# Patient Record
Sex: MALE | Race: WHITE | NOT HISPANIC OR LATINO | Employment: OTHER | URBAN - METROPOLITAN AREA
[De-identification: names, ages, dates, MRNs, and addresses within clinical notes are randomized per-mention and may not be internally consistent; named-entity substitution may affect disease eponyms.]

---

## 2017-01-12 ENCOUNTER — APPOINTMENT (OUTPATIENT)
Dept: LAB | Facility: HOSPITAL | Age: 62
End: 2017-01-12
Attending: INTERNAL MEDICINE
Payer: MEDICARE

## 2017-01-12 ENCOUNTER — TRANSCRIBE ORDERS (OUTPATIENT)
Dept: ADMINISTRATIVE | Facility: HOSPITAL | Age: 62
End: 2017-01-12

## 2017-01-12 ENCOUNTER — ALLSCRIPTS OFFICE VISIT (OUTPATIENT)
Dept: OTHER | Facility: OTHER | Age: 62
End: 2017-01-12

## 2017-01-12 DIAGNOSIS — R10.33 PERIUMBILICAL PAIN: ICD-10-CM

## 2017-01-12 LAB
ALBUMIN SERPL BCP-MCNC: 3.3 G/DL (ref 3.5–5)
ALP SERPL-CCNC: 77 U/L (ref 46–116)
ALT SERPL W P-5'-P-CCNC: 35 U/L (ref 12–78)
ANION GAP SERPL CALCULATED.3IONS-SCNC: 5 MMOL/L (ref 4–13)
AST SERPL W P-5'-P-CCNC: 14 U/L (ref 5–45)
BILIRUB SERPL-MCNC: 0.2 MG/DL (ref 0.2–1)
BUN SERPL-MCNC: 16 MG/DL (ref 5–25)
CALCIUM SERPL-MCNC: 8.3 MG/DL (ref 8.3–10.1)
CHLORIDE SERPL-SCNC: 103 MMOL/L (ref 100–108)
CO2 SERPL-SCNC: 29 MMOL/L (ref 21–32)
CREAT SERPL-MCNC: 0.96 MG/DL (ref 0.6–1.3)
CRP SERPL QL: 1.5 MG/L
ERYTHROCYTE [DISTWIDTH] IN BLOOD BY AUTOMATED COUNT: 12.8 % (ref 11.6–15.1)
GFR SERPL CREATININE-BSD FRML MDRD: >60 ML/MIN/1.73SQ M
GLUCOSE SERPL-MCNC: 322 MG/DL (ref 65–140)
HCT VFR BLD AUTO: 45.1 % (ref 36.5–49.3)
HGB BLD-MCNC: 15.4 G/DL (ref 12–17)
LIPASE SERPL-CCNC: 178 U/L (ref 73–393)
MCH RBC QN AUTO: 29.6 PG (ref 26.8–34.3)
MCHC RBC AUTO-ENTMCNC: 34.1 G/DL (ref 31.4–37.4)
MCV RBC AUTO: 87 FL (ref 82–98)
PLATELET # BLD AUTO: 189 THOUSANDS/UL (ref 149–390)
PMV BLD AUTO: 9.9 FL (ref 8.9–12.7)
POTASSIUM SERPL-SCNC: 4.2 MMOL/L (ref 3.5–5.3)
PROT SERPL-MCNC: 6.7 G/DL (ref 6.4–8.2)
RBC # BLD AUTO: 5.21 MILLION/UL (ref 3.88–5.62)
SODIUM SERPL-SCNC: 137 MMOL/L (ref 136–145)
WBC # BLD AUTO: 7.72 THOUSAND/UL (ref 4.31–10.16)

## 2017-01-12 PROCEDURE — 36415 COLL VENOUS BLD VENIPUNCTURE: CPT

## 2017-01-12 PROCEDURE — 80053 COMPREHEN METABOLIC PANEL: CPT

## 2017-01-12 PROCEDURE — 83690 ASSAY OF LIPASE: CPT

## 2017-01-12 PROCEDURE — 85027 COMPLETE CBC AUTOMATED: CPT

## 2017-01-12 PROCEDURE — 86140 C-REACTIVE PROTEIN: CPT

## 2017-01-17 ENCOUNTER — HOSPITAL ENCOUNTER (OUTPATIENT)
Dept: CT IMAGING | Facility: HOSPITAL | Age: 62
Discharge: HOME | End: 2017-01-17
Attending: INTERNAL MEDICINE
Payer: MEDICARE

## 2017-01-17 DIAGNOSIS — R10.33 PERIUMBILICAL PAIN: ICD-10-CM

## 2017-01-17 PROCEDURE — 74177 CT ABD & PELVIS W/CONTRAST: CPT

## 2017-01-17 RX ADMIN — IOHEXOL 100 ML: 350 INJECTION, SOLUTION INTRAVENOUS at 08:06

## 2017-01-19 ENCOUNTER — GENERIC CONVERSION - ENCOUNTER (OUTPATIENT)
Dept: OTHER | Facility: OTHER | Age: 62
End: 2017-01-19

## 2018-01-13 VITALS
HEART RATE: 79 BPM | HEIGHT: 70 IN | WEIGHT: 210 LBS | OXYGEN SATURATION: 99 % | DIASTOLIC BLOOD PRESSURE: 78 MMHG | RESPIRATION RATE: 18 BRPM | SYSTOLIC BLOOD PRESSURE: 130 MMHG | TEMPERATURE: 97.5 F | BODY MASS INDEX: 30.06 KG/M2

## 2018-01-13 NOTE — RESULT NOTES
Verified Results  * CT ABDOMEN PELVIS W CONTRAST [id number] 07:47AM Gwen Mata Order Number: [id number]   Performing Comments: eval for mesenteric panniculitis and pancreatitis   - Patient Instructions: To schedule this appointment, please contact Central Scheduling at [phone]  Test Name Result Flag Reference   CT ABDOMEN PELVIS W CONTRAST (Report)     CT ABDOMEN AND PELVIS WITH IV CONTRAST     INDICATION: Mid abdominal pain x1 month with bloating  Evaluate for pancreatitis  COMPARISON: Ultrasound 12/26/2016  CT scan 12/26/2016  TECHNIQUE: CT examination of the abdomen and pelvis  Contrast was injected one time intravenously without immediate complication  Scanning through the abdomen was performed in arterial, venous and delayed phases according a protocol spefically    designed to evaluate upper abdominal viscera  Axial, sagittal and coronal reformatted projections were created  This examination, like all CT scans performed in the [facility], was performed utilizing techniques to minimize radiation    dose exposure, including the use of iterative reconstruction and automated exposure control  IV Contrast: iohexol (OMNIPAQUE) 350 MG/ML injection (MULTI-DOSE) 100 mL Note: (SINGLE DOSE/MULTI DOSE) information refers to the container from which the contrast was acquired  Contrast was injected one time intravenously without immediate    complication  Enteric Contrast: Enteric contrast was administered  FINDINGS:     ABDOMEN     LOWER CHEST: No significant abnormalities identified in the lower chest      LIVER/BILIARY TREE: Liver is diffusely decreased in density consistent with fatty change  No CT evidence of suspicious hepatic mass  Normal hepatic contours  No biliary dilatation  GALLBLADDER: No calcified gallstones  No pericholecystic inflammatory change  SPLEEN: Unremarkable  PANCREAS: Unremarkable  ADRENAL GLANDS: Unremarkable  KIDNEYS/URETERS: Stable simple cyst in the right kidney measures 3 3 x 3 2 cm  No hydronephrosis or calculi  STOMACH AND BOWEL: Unremarkable  APPENDIX: No findings to suggest appendicitis  ABDOMINOPELVIC CAVITY: Stable hazy infiltration of the mesenteric fat with numerous interspersed prominent but not pathologically enlarged lymph nodes  No ascites  No pneumoperitoneum  VESSELS: Atherosclerotic changes are present  No evidence of aneurysm  PELVIS     REPRODUCTIVE ORGANS: Unremarkable for patient's age  URINARY BLADDER: Unremarkable  ABDOMINAL WALL/INGUINAL REGIONS: Unremarkable  OSSEOUS STRUCTURES: No acute fracture or destructive osseous lesion  IMPRESSION:     1  No acute pathology  No evidence for pancreatitis  2  Stable maude mesentery sign  This is nonspecific, however differential considerations again include mesenteric panniculitis, and less likely lymphoma  3  Fatty infiltration of the liver         Workstation performed: [id number]     Signed by:   Seymour Limon MD   1/17/17       Plan  Mesenteric panniculitis, Periumbilical abdominal pain    · PredniSONE 20 MG Oral Tablet; TAKE 2 TABLETS DAILY WITH FOOD   · Tamoxifen Citrate 10 MG Oral Tablet; TAKE 1 TABLET EVERY 12 HOURS DAILY

## 2019-05-02 ENCOUNTER — HOSPITAL ENCOUNTER (EMERGENCY)
Facility: HOSPITAL | Age: 64
Discharge: HOME | End: 2019-05-02
Attending: EMERGENCY MEDICINE | Admitting: EMERGENCY MEDICINE
Payer: MEDICARE

## 2019-05-02 VITALS
DIASTOLIC BLOOD PRESSURE: 91 MMHG | SYSTOLIC BLOOD PRESSURE: 130 MMHG | HEART RATE: 102 BPM | RESPIRATION RATE: 20 BRPM | TEMPERATURE: 97.9 F | OXYGEN SATURATION: 99 %

## 2019-05-02 DIAGNOSIS — E11.9 DIABETES (HCC): ICD-10-CM

## 2019-05-02 DIAGNOSIS — M79.606 LEG PAIN: Primary | ICD-10-CM

## 2019-05-02 DIAGNOSIS — G62.9 NEUROPATHY: ICD-10-CM

## 2019-05-02 LAB
ALBUMIN SERPL BCP-MCNC: 3.6 G/DL (ref 3.5–5)
ALP SERPL-CCNC: 81 U/L (ref 46–116)
ALT SERPL W P-5'-P-CCNC: 31 U/L (ref 12–78)
ANION GAP SERPL CALCULATED.3IONS-SCNC: 7 MMOL/L (ref 4–13)
AST SERPL W P-5'-P-CCNC: 13 U/L (ref 5–45)
BASOPHILS # BLD AUTO: 0.06 THOUSANDS/ΜL (ref 0–0.1)
BASOPHILS NFR BLD AUTO: 1 % (ref 0–1)
BILIRUB DIRECT SERPL-MCNC: 0.09 MG/DL (ref 0–0.2)
BILIRUB SERPL-MCNC: 0.4 MG/DL (ref 0.2–1)
BUN SERPL-MCNC: 19 MG/DL (ref 5–25)
CALCIUM SERPL-MCNC: 8.8 MG/DL (ref 8.3–10.1)
CHLORIDE SERPL-SCNC: 103 MMOL/L (ref 100–108)
CO2 SERPL-SCNC: 27 MMOL/L (ref 21–32)
CREAT SERPL-MCNC: 0.79 MG/DL (ref 0.6–1.3)
EOSINOPHIL # BLD AUTO: 0.26 THOUSAND/ΜL (ref 0–0.61)
EOSINOPHIL NFR BLD AUTO: 3 % (ref 0–6)
ERYTHROCYTE [DISTWIDTH] IN BLOOD BY AUTOMATED COUNT: 12.6 % (ref 11.6–15.1)
GFR SERPL CREATININE-BSD FRML MDRD: 95 ML/MIN/1.73SQ M
GLUCOSE SERPL-MCNC: 331 MG/DL (ref 65–140)
HCT VFR BLD AUTO: 44.5 % (ref 36.5–49.3)
HGB BLD-MCNC: 15.9 G/DL (ref 12–17)
IMM GRANULOCYTES # BLD AUTO: 0.04 THOUSAND/UL (ref 0–0.2)
IMM GRANULOCYTES NFR BLD AUTO: 1 % (ref 0–2)
LYMPHOCYTES # BLD AUTO: 2.89 THOUSANDS/ΜL (ref 0.6–4.47)
LYMPHOCYTES NFR BLD AUTO: 34 % (ref 14–44)
MAGNESIUM SERPL-MCNC: 1.8 MG/DL (ref 1.6–2.6)
MCH RBC QN AUTO: 30.8 PG (ref 26.8–34.3)
MCHC RBC AUTO-ENTMCNC: 35.7 G/DL (ref 31.4–37.4)
MCV RBC AUTO: 86 FL (ref 82–98)
MONOCYTES # BLD AUTO: 0.69 THOUSAND/ΜL (ref 0.17–1.22)
MONOCYTES NFR BLD AUTO: 8 % (ref 4–12)
NEUTROPHILS # BLD AUTO: 4.65 THOUSANDS/ΜL (ref 1.85–7.62)
NEUTS SEG NFR BLD AUTO: 53 % (ref 43–75)
NRBC BLD AUTO-RTO: 0 /100 WBCS
PLATELET # BLD AUTO: 201 THOUSANDS/UL (ref 149–390)
PMV BLD AUTO: 10.6 FL (ref 8.9–12.7)
POTASSIUM SERPL-SCNC: 4.3 MMOL/L (ref 3.5–5.3)
PROT SERPL-MCNC: 6.6 G/DL (ref 6.4–8.2)
RBC # BLD AUTO: 5.17 MILLION/UL (ref 3.88–5.62)
SODIUM SERPL-SCNC: 137 MMOL/L (ref 136–145)
TSH SERPL DL<=0.05 MIU/L-ACNC: 1.1 UIU/ML (ref 0.36–3.74)
WBC # BLD AUTO: 8.59 THOUSAND/UL (ref 4.31–10.16)

## 2019-05-02 PROCEDURE — 84443 ASSAY THYROID STIM HORMONE: CPT | Performed by: EMERGENCY MEDICINE

## 2019-05-02 PROCEDURE — 80076 HEPATIC FUNCTION PANEL: CPT | Performed by: EMERGENCY MEDICINE

## 2019-05-02 PROCEDURE — 96361 HYDRATE IV INFUSION ADD-ON: CPT

## 2019-05-02 PROCEDURE — 99283 EMERGENCY DEPT VISIT LOW MDM: CPT

## 2019-05-02 PROCEDURE — 83735 ASSAY OF MAGNESIUM: CPT | Performed by: EMERGENCY MEDICINE

## 2019-05-02 PROCEDURE — 85025 COMPLETE CBC W/AUTO DIFF WBC: CPT | Performed by: EMERGENCY MEDICINE

## 2019-05-02 PROCEDURE — 36415 COLL VENOUS BLD VENIPUNCTURE: CPT | Performed by: EMERGENCY MEDICINE

## 2019-05-02 PROCEDURE — 80048 BASIC METABOLIC PNL TOTAL CA: CPT | Performed by: EMERGENCY MEDICINE

## 2019-05-02 PROCEDURE — 86618 LYME DISEASE ANTIBODY: CPT | Performed by: EMERGENCY MEDICINE

## 2019-05-02 PROCEDURE — 99284 EMERGENCY DEPT VISIT MOD MDM: CPT | Performed by: EMERGENCY MEDICINE

## 2019-05-02 PROCEDURE — 96360 HYDRATION IV INFUSION INIT: CPT

## 2019-05-02 RX ORDER — TRAMADOL HYDROCHLORIDE 50 MG/1
50 TABLET ORAL EVERY 8 HOURS PRN
Qty: 20 TABLET | Refills: 0 | Status: SHIPPED | OUTPATIENT
Start: 2019-05-02 | End: 2019-05-09

## 2019-05-02 RX ORDER — TRAMADOL HYDROCHLORIDE 50 MG/1
50 TABLET ORAL ONCE
Status: COMPLETED | OUTPATIENT
Start: 2019-05-02 | End: 2019-05-02

## 2019-05-02 RX ADMIN — SODIUM CHLORIDE 1000 ML: 0.9 INJECTION, SOLUTION INTRAVENOUS at 18:32

## 2019-05-02 RX ADMIN — TRAMADOL HYDROCHLORIDE 50 MG: 50 TABLET, COATED ORAL at 18:23

## 2019-05-03 ENCOUNTER — TELEPHONE (OUTPATIENT)
Dept: NEUROLOGY | Facility: CLINIC | Age: 64
End: 2019-05-03

## 2019-05-03 LAB
B BURGDOR IGG SER IA-ACNC: 0.12
B BURGDOR IGM SER IA-ACNC: 0.25

## 2019-05-08 ENCOUNTER — HOSPITAL ENCOUNTER (EMERGENCY)
Facility: HOSPITAL | Age: 64
Discharge: HOME | End: 2019-05-08
Attending: EMERGENCY MEDICINE | Admitting: EMERGENCY MEDICINE
Payer: MEDICARE

## 2019-05-08 VITALS
HEIGHT: 70 IN | WEIGHT: 172 LBS | BODY MASS INDEX: 24.62 KG/M2 | HEART RATE: 83 BPM | SYSTOLIC BLOOD PRESSURE: 121 MMHG | RESPIRATION RATE: 17 BRPM | TEMPERATURE: 98.1 F | DIASTOLIC BLOOD PRESSURE: 69 MMHG | OXYGEN SATURATION: 98 %

## 2019-05-08 DIAGNOSIS — G62.9 PERIPHERAL NEUROPATHY: ICD-10-CM

## 2019-05-08 DIAGNOSIS — R42 LIGHTHEADED: Primary | ICD-10-CM

## 2019-05-08 LAB
ALBUMIN SERPL BCP-MCNC: 3.2 G/DL (ref 3.5–5)
ALP SERPL-CCNC: 78 U/L (ref 46–116)
ALT SERPL W P-5'-P-CCNC: 29 U/L (ref 12–78)
ANION GAP SERPL CALCULATED.3IONS-SCNC: 3 MMOL/L (ref 4–13)
AST SERPL W P-5'-P-CCNC: 15 U/L (ref 5–45)
ATRIAL RATE: 76 BPM
BASOPHILS # BLD AUTO: 0.07 THOUSANDS/ΜL (ref 0–0.1)
BASOPHILS NFR BLD AUTO: 1 % (ref 0–1)
BILIRUB SERPL-MCNC: 0.3 MG/DL (ref 0.2–1)
BUN SERPL-MCNC: 22 MG/DL (ref 5–25)
CALCIUM SERPL-MCNC: 8.6 MG/DL (ref 8.3–10.1)
CHLORIDE SERPL-SCNC: 102 MMOL/L (ref 100–108)
CO2 SERPL-SCNC: 32 MMOL/L (ref 21–32)
CREAT SERPL-MCNC: 0.82 MG/DL (ref 0.6–1.3)
EOSINOPHIL # BLD AUTO: 0.32 THOUSAND/ΜL (ref 0–0.61)
EOSINOPHIL NFR BLD AUTO: 5 % (ref 0–6)
ERYTHROCYTE [DISTWIDTH] IN BLOOD BY AUTOMATED COUNT: 12.5 % (ref 11.6–15.1)
GFR SERPL CREATININE-BSD FRML MDRD: 93 ML/MIN/1.73SQ M
GLUCOSE SERPL-MCNC: 274 MG/DL (ref 65–140)
HCT VFR BLD AUTO: 42.6 % (ref 36.5–49.3)
HGB BLD-MCNC: 15.3 G/DL (ref 12–17)
IMM GRANULOCYTES # BLD AUTO: 0.04 THOUSAND/UL (ref 0–0.2)
IMM GRANULOCYTES NFR BLD AUTO: 1 % (ref 0–2)
LYMPHOCYTES # BLD AUTO: 2.64 THOUSANDS/ΜL (ref 0.6–4.47)
LYMPHOCYTES NFR BLD AUTO: 41 % (ref 14–44)
MCH RBC QN AUTO: 31.2 PG (ref 26.8–34.3)
MCHC RBC AUTO-ENTMCNC: 35.9 G/DL (ref 31.4–37.4)
MCV RBC AUTO: 87 FL (ref 82–98)
MONOCYTES # BLD AUTO: 0.58 THOUSAND/ΜL (ref 0.17–1.22)
MONOCYTES NFR BLD AUTO: 9 % (ref 4–12)
NEUTROPHILS # BLD AUTO: 2.79 THOUSANDS/ΜL (ref 1.85–7.62)
NEUTS SEG NFR BLD AUTO: 43 % (ref 43–75)
NRBC BLD AUTO-RTO: 0 /100 WBCS
P AXIS: 59 DEGREES
PLATELET # BLD AUTO: 189 THOUSANDS/UL (ref 149–390)
PMV BLD AUTO: 10.4 FL (ref 8.9–12.7)
POTASSIUM SERPL-SCNC: 4.6 MMOL/L (ref 3.5–5.3)
PR INTERVAL: 146 MS
PROT SERPL-MCNC: 6.1 G/DL (ref 6.4–8.2)
QRS AXIS: 86 DEGREES
QRSD INTERVAL: 86 MS
QT INTERVAL: 394 MS
QTC INTERVAL: 443 MS
RBC # BLD AUTO: 4.9 MILLION/UL (ref 3.88–5.62)
SODIUM SERPL-SCNC: 137 MMOL/L (ref 136–145)
T WAVE AXIS: 80 DEGREES
TROPONIN I SERPL-MCNC: <0.02 NG/ML
VENTRICULAR RATE: 76 BPM
WBC # BLD AUTO: 6.44 THOUSAND/UL (ref 4.31–10.16)

## 2019-05-08 PROCEDURE — 93005 ELECTROCARDIOGRAM TRACING: CPT

## 2019-05-08 PROCEDURE — 99283 EMERGENCY DEPT VISIT LOW MDM: CPT | Performed by: PHYSICIAN ASSISTANT

## 2019-05-08 PROCEDURE — 99283 EMERGENCY DEPT VISIT LOW MDM: CPT

## 2019-05-08 PROCEDURE — 85025 COMPLETE CBC W/AUTO DIFF WBC: CPT | Performed by: PHYSICIAN ASSISTANT

## 2019-05-08 PROCEDURE — 93010 ELECTROCARDIOGRAM REPORT: CPT | Performed by: INTERNAL MEDICINE

## 2019-05-08 PROCEDURE — 84484 ASSAY OF TROPONIN QUANT: CPT | Performed by: PHYSICIAN ASSISTANT

## 2019-05-08 PROCEDURE — 80053 COMPREHEN METABOLIC PANEL: CPT | Performed by: PHYSICIAN ASSISTANT

## 2019-05-08 PROCEDURE — 36415 COLL VENOUS BLD VENIPUNCTURE: CPT | Performed by: PHYSICIAN ASSISTANT

## 2019-05-30 ENCOUNTER — TELEPHONE (OUTPATIENT)
Dept: PAIN MEDICINE | Facility: CLINIC | Age: 64
End: 2019-05-30

## 2019-06-16 ENCOUNTER — APPOINTMENT (EMERGENCY)
Dept: RADIOLOGY | Facility: HOSPITAL | Age: 64
End: 2019-06-16
Payer: MEDICARE

## 2019-06-16 ENCOUNTER — HOSPITAL ENCOUNTER (EMERGENCY)
Facility: HOSPITAL | Age: 64
Discharge: HOME | End: 2019-06-16
Attending: EMERGENCY MEDICINE | Admitting: EMERGENCY MEDICINE
Payer: MEDICARE

## 2019-06-16 VITALS
TEMPERATURE: 97.8 F | DIASTOLIC BLOOD PRESSURE: 94 MMHG | HEART RATE: 88 BPM | RESPIRATION RATE: 16 BRPM | SYSTOLIC BLOOD PRESSURE: 162 MMHG | OXYGEN SATURATION: 99 %

## 2019-06-16 DIAGNOSIS — M25.561 KNEE PAIN, RIGHT: Primary | ICD-10-CM

## 2019-06-16 DIAGNOSIS — M17.11 OSTEOARTHRITIS OF RIGHT KNEE: ICD-10-CM

## 2019-06-16 PROCEDURE — 99283 EMERGENCY DEPT VISIT LOW MDM: CPT

## 2019-06-16 PROCEDURE — 73564 X-RAY EXAM KNEE 4 OR MORE: CPT

## 2019-06-16 PROCEDURE — 99283 EMERGENCY DEPT VISIT LOW MDM: CPT | Performed by: PHYSICIAN ASSISTANT

## 2019-06-16 RX ORDER — TRAMADOL HYDROCHLORIDE 50 MG/1
50 TABLET ORAL EVERY 6 HOURS PRN
Qty: 8 TABLET | Refills: 0 | Status: SHIPPED | OUTPATIENT
Start: 2019-06-16

## 2019-06-16 RX ORDER — OXYCODONE HYDROCHLORIDE AND ACETAMINOPHEN 5; 325 MG/1; MG/1
1 TABLET ORAL ONCE
Status: COMPLETED | OUTPATIENT
Start: 2019-06-16 | End: 2019-06-16

## 2019-06-16 RX ADMIN — OXYCODONE HYDROCHLORIDE AND ACETAMINOPHEN 1 TABLET: 5; 325 TABLET ORAL at 07:34

## 2020-02-24 ENCOUNTER — OFFICE VISIT (OUTPATIENT)
Dept: DERMATOLOGY | Facility: CLINIC | Age: 65
End: 2020-02-24
Payer: COMMERCIAL

## 2020-02-24 DIAGNOSIS — G62.9 NEUROPATHY: Primary | ICD-10-CM

## 2020-02-24 DIAGNOSIS — Z13.89 SCREENING FOR SKIN CONDITION: ICD-10-CM

## 2020-02-24 PROCEDURE — 99203 OFFICE O/P NEW LOW 30 MIN: CPT | Performed by: DERMATOLOGY

## 2020-02-24 RX ORDER — ATORVASTATIN CALCIUM 10 MG/1
10 TABLET, FILM COATED ORAL DAILY
COMMUNITY

## 2020-02-24 RX ORDER — DULOXETIN HYDROCHLORIDE 20 MG/1
20 CAPSULE, DELAYED RELEASE ORAL 2 TIMES DAILY
COMMUNITY

## 2020-02-24 RX ORDER — PREGABALIN 75 MG/1
75 CAPSULE ORAL 2 TIMES DAILY
COMMUNITY

## 2020-02-24 NOTE — PATIENT INSTRUCTIONS
Patient with unusual symptoms difficult to ascertain the etiology for this process appears to be a generalized neuropathy that is causing this process also concerned regarding potential for fibromyalgia  Patient does not have any blood work that I can review at this time nothing else of concern seen   would suggest seen either neurologist or rheumatologist for their opinion  Screening for Dermatologic Disorders: Nothing else of concern noted on complete exam follow up in 1 year

## 2020-02-24 NOTE — PROGRESS NOTES
[address] [facility] DERMATOLOGY  [address] [place] [id number]  [phone]  [phone]     MRN: [id number] : 1955  Encounter: [id number]  Patient Information: Bere Krishna  Chief complaint:  Burning skin    History of present illness:  71-year-old male with known history of diabetes with known history of neuropathy complains of burning skin that started suddenly in February  Patient denies any rashes any clothes or anything that touches the area makes feels LE burning and feels like the skin is being cut no other concerns noted  Past Medical History:   Diagnosis Date    Diabetes mellitus ([facility]     Prostate cancer ([facility] )      Past Surgical History:   Procedure Laterality Date    PROSTATE SURGERY       Social History   Social History     Substance and Sexual Activity   Alcohol Use No     Social History     Substance and Sexual Activity   Drug Use No     Social History     Tobacco Use   Smoking Status Former Smoker   Smokeless Tobacco Never Used     History reviewed  No pertinent family history  Meds/Allergies   Allergies   Allergen Reactions    Codeine GI Intolerance    Lisinopril        Meds:  Prior to Admission medications    Medication Sig Start Date End Date Taking?  Authorizing Provider   atorvastatin (LIPITOR) 10 mg tablet Take 10 mg by mouth daily   Yes Historical Provider, MD   DULoxetine (CYMBALTA) 20 mg capsule Take 20 mg by mouth 2 (two) times a day   Yes Historical Provider, MD   insulin aspart (NOVOLOG FLEXPEN) 100 Units/mL injection pen Inject 7 Units under the skin 19  Yes Historical Provider, MD   insulin glargine (LANTUS SOLOSTAR) 100 units/mL injection pen Inject 15 Units under the skin daily 19  Yes Historical Provider, MD   pregabalin (LYRICA) 75 mg capsule Take 75 mg by mouth 2 (two) times a day   Yes Historical Provider, MD   famotidine (PEPCID) 20 mg tablet Take 1 tablet by mouth 2 (two) times a day as needed for heartburn  Patient not taking: Reported on 6/16/2019 12/28/16   Tiarra Patterson MD   glipiZIDE (GLUCOTROL) 10 mg tablet Take 10 mg by mouth once    Historical Provider, MD   metFORMIN (GLUCOPHAGE) 500 mg tablet Take 500 mg by mouth 2 (two) times a day with meals    Historical Provider, MD   traMADol (ULTRAM) 50 mg tablet Take 1 tablet (50 mg total) by mouth every 6 (six) hours as needed for moderate pain  Patient not taking: Reported on 2/24/2020 6/16/19   Abram Bran PA-C       Subjective:     Review of Systems:    General: negative for - chills, fatigue, fever,  weight gain or weight loss  Psychological: negative for - anxiety, behavioral disorder, concentration difficulties, decreased libido, depression, irritability, memory difficulties, mood swings, sleep disturbances or suicidal ideation  ENT: negative for - hearing difficulties , nasal congestion, nasal discharge, oral lesions, sinus pain, sneezing, sore throat  Allergy and Immunology: negative for - hives, insect bite sensitivity,  Hematological and Lymphatic: negative for - bleeding problems, blood clots,bruising, swollen lymph nodes  Endocrine: negative for - hair pattern changes, hot flashes, malaise/lethargy, mood swings, palpitations, polydipsia/polyuria, skin changes, temperature intolerance or unexpected weight change  Respiratory: negative for - cough, hemoptysis, orthopnea, shortness of breath, or wheezing  Cardiovascular: negative for - chest pain, dyspnea on exertion, edema,  Gastrointestinal: negative for - abdominal pain, nausea/vomiting  Genito-Urinary: negative for - dysuria, incontinence, irregular/heavy menses or urinary frequency/urgency  Musculoskeletal: negative for - gait disturbance, joint pain, joint stiffness, joint swelling, muscle pain, muscular weakness  Dermatological:  As in HPI  Neurological: negative for confusion, dizziness, headaches, impaired coordination/balance, memory loss, numbness/tingling, seizures, speech problems, tremors or weakness       Objective: There were no vitals taken for this visit  Physical Exam:    General Appearance:    Alert, cooperative, no distress   Head:    Normocephalic, without obvious abnormality, atraumatic           Skin:   A full skin exam was performed including scalp, head scalp, eyes, ears, nose, lips, neck, chest, axilla, abdomen, back, buttocks, bilateral upper extremities, bilateral lower extremities, hands, feet, fingers, toes, fingernails, and toenails no rash no dermatographia nothing else of concern seen at this time     Assessment:     1  Neuropathy     2  Screening for skin condition           Plan:   Patient with unusual symptoms difficult to ascertain the etiology for this process appears to be a generalized neuropathy that is causing this process also concerned regarding potential for fibromyalgia  Patient does not have any blood work that I can review at this time nothing else of concern seen would suggest seen either neurologist or rheumatologist for their opinion  Screening for Dermatologic Disorders: Nothing else of concern noted on complete exam follow up in 1 year       Frances Hernández MD  2/24/2020,10:53 AM    Portions of the record may have been created with voice recognition software   Occasional wrong word or "sound a like" substitutions may have occurred due to the inherent limitations of voice recognition software   Read the chart carefully and recognize, using context, where substitutions have occurred

## 2023-10-25 ENCOUNTER — APPOINTMENT (EMERGENCY)
Dept: RADIOLOGY | Facility: HOSPITAL | Age: 68
DRG: 854 | End: 2023-10-25
Payer: COMMERCIAL

## 2023-10-25 ENCOUNTER — APPOINTMENT (EMERGENCY)
Dept: CT IMAGING | Facility: HOSPITAL | Age: 68
DRG: 854 | End: 2023-10-25
Payer: COMMERCIAL

## 2023-10-25 ENCOUNTER — HOSPITAL ENCOUNTER (INPATIENT)
Facility: HOSPITAL | Age: 68
LOS: 8 days | Discharge: HOME | DRG: 854 | End: 2023-11-02
Attending: EMERGENCY MEDICINE | Admitting: INTERNAL MEDICINE
Payer: COMMERCIAL

## 2023-10-25 DIAGNOSIS — Z22.322 MRSA (METHICILLIN RESISTANT STAPH AUREUS) CULTURE POSITIVE: ICD-10-CM

## 2023-10-25 DIAGNOSIS — M86.9 OSTEOMYELITIS (HCC): ICD-10-CM

## 2023-10-25 DIAGNOSIS — L97.529 DIABETIC ULCER OF LEFT GREAT TOE (HCC): ICD-10-CM

## 2023-10-25 DIAGNOSIS — E11.65 TYPE 2 DIABETES MELLITUS WITH HYPERGLYCEMIA, WITH LONG-TERM CURRENT USE OF INSULIN (HCC): ICD-10-CM

## 2023-10-25 DIAGNOSIS — E11.621 DIABETIC ULCER OF LEFT GREAT TOE (HCC): ICD-10-CM

## 2023-10-25 DIAGNOSIS — L03.119 CELLULITIS IN DIABETIC FOOT: ICD-10-CM

## 2023-10-25 DIAGNOSIS — Z79.4 TYPE 2 DIABETES MELLITUS WITH HYPERGLYCEMIA, WITH LONG-TERM CURRENT USE OF INSULIN (HCC): ICD-10-CM

## 2023-10-25 DIAGNOSIS — E11.628 CELLULITIS IN DIABETIC FOOT: ICD-10-CM

## 2023-10-25 DIAGNOSIS — L03.116 CELLULITIS OF LEFT FOOT: Primary | ICD-10-CM

## 2023-10-25 PROBLEM — R42 VERTIGO: Status: ACTIVE | Noted: 2023-10-25

## 2023-10-25 PROBLEM — R79.89 PSEUDOHYPONATREMIA: Status: ACTIVE | Noted: 2023-10-25

## 2023-10-25 LAB
2HR DELTA HS TROPONIN: 1 NG/L
ALBUMIN SERPL BCP-MCNC: 3.2 G/DL (ref 3.5–5)
ALP SERPL-CCNC: 98 U/L (ref 34–104)
ALT SERPL W P-5'-P-CCNC: 11 U/L (ref 7–52)
ANION GAP SERPL CALCULATED.3IONS-SCNC: 12 MMOL/L
APTT PPP: 32 SECONDS (ref 23–37)
AST SERPL W P-5'-P-CCNC: 13 U/L (ref 13–39)
BASOPHILS # BLD AUTO: 0.06 THOUSANDS/ÂΜL (ref 0–0.1)
BASOPHILS NFR BLD AUTO: 0 % (ref 0–1)
BILIRUB SERPL-MCNC: 0.63 MG/DL (ref 0.2–1)
BUN SERPL-MCNC: 25 MG/DL (ref 5–25)
CALCIUM ALBUM COR SERPL-MCNC: 9 MG/DL (ref 8.3–10.1)
CALCIUM SERPL-MCNC: 8.4 MG/DL (ref 8.4–10.2)
CARDIAC TROPONIN I PNL SERPL HS: 4 NG/L
CARDIAC TROPONIN I PNL SERPL HS: 5 NG/L
CHLORIDE SERPL-SCNC: 95 MMOL/L (ref 96–108)
CO2 SERPL-SCNC: 21 MMOL/L (ref 21–32)
CREAT SERPL-MCNC: 1.1 MG/DL (ref 0.6–1.3)
EOSINOPHIL # BLD AUTO: 0 THOUSAND/ÂΜL (ref 0–0.61)
EOSINOPHIL NFR BLD AUTO: 0 % (ref 0–6)
ERYTHROCYTE [DISTWIDTH] IN BLOOD BY AUTOMATED COUNT: 12.2 % (ref 11.6–15.1)
GFR SERPL CREATININE-BSD FRML MDRD: 68 ML/MIN/1.73SQ M
GLUCOSE SERPL-MCNC: 309 MG/DL (ref 65–140)
HCT VFR BLD AUTO: 38.6 % (ref 36.5–49.3)
HGB BLD-MCNC: 13.1 G/DL (ref 12–17)
IMM GRANULOCYTES # BLD AUTO: 0.07 THOUSAND/UL (ref 0–0.2)
IMM GRANULOCYTES NFR BLD AUTO: 1 % (ref 0–2)
INR PPP: 1.08 (ref 0.84–1.19)
LACTATE SERPL-SCNC: 1 MMOL/L (ref 0.5–2)
LYMPHOCYTES # BLD AUTO: 1 THOUSANDS/ÂΜL (ref 0.6–4.47)
LYMPHOCYTES NFR BLD AUTO: 7 % (ref 14–44)
MCH RBC QN AUTO: 28.8 PG (ref 26.8–34.3)
MCHC RBC AUTO-ENTMCNC: 33.9 G/DL (ref 31.4–37.4)
MCV RBC AUTO: 85 FL (ref 82–98)
MONOCYTES # BLD AUTO: 1.04 THOUSAND/ÂΜL (ref 0.17–1.22)
MONOCYTES NFR BLD AUTO: 7 % (ref 4–12)
NEUTROPHILS # BLD AUTO: 12.32 THOUSANDS/ÂΜL (ref 1.85–7.62)
NEUTS SEG NFR BLD AUTO: 85 % (ref 43–75)
NRBC BLD AUTO-RTO: 0 /100 WBCS
PLATELET # BLD AUTO: 284 THOUSANDS/UL (ref 149–390)
PLATELET # BLD AUTO: 330 THOUSANDS/UL (ref 149–390)
PMV BLD AUTO: 9.8 FL (ref 8.9–12.7)
PMV BLD AUTO: 9.8 FL (ref 8.9–12.7)
POTASSIUM SERPL-SCNC: 4.2 MMOL/L (ref 3.5–5.3)
PROCALCITONIN SERPL-MCNC: 1.11 NG/ML
PROT SERPL-MCNC: 7.5 G/DL (ref 6.4–8.4)
PROTHROMBIN TIME: 14.7 SECONDS (ref 11.6–14.5)
RBC # BLD AUTO: 4.55 MILLION/UL (ref 3.88–5.62)
SODIUM SERPL-SCNC: 128 MMOL/L (ref 135–147)
WBC # BLD AUTO: 14.49 THOUSAND/UL (ref 4.31–10.16)

## 2023-10-25 PROCEDURE — 70498 CT ANGIOGRAPHY NECK: CPT

## 2023-10-25 PROCEDURE — 96365 THER/PROPH/DIAG IV INF INIT: CPT

## 2023-10-25 PROCEDURE — 85610 PROTHROMBIN TIME: CPT | Performed by: PHYSICIAN ASSISTANT

## 2023-10-25 PROCEDURE — 99223 1ST HOSP IP/OBS HIGH 75: CPT | Performed by: INTERNAL MEDICINE

## 2023-10-25 PROCEDURE — 84145 PROCALCITONIN (PCT): CPT | Performed by: PHYSICIAN ASSISTANT

## 2023-10-25 PROCEDURE — 93005 ELECTROCARDIOGRAM TRACING: CPT

## 2023-10-25 PROCEDURE — 85025 COMPLETE CBC W/AUTO DIFF WBC: CPT | Performed by: PHYSICIAN ASSISTANT

## 2023-10-25 PROCEDURE — 83605 ASSAY OF LACTIC ACID: CPT | Performed by: PHYSICIAN ASSISTANT

## 2023-10-25 PROCEDURE — 70496 CT ANGIOGRAPHY HEAD: CPT

## 2023-10-25 PROCEDURE — 99284 EMERGENCY DEPT VISIT MOD MDM: CPT

## 2023-10-25 PROCEDURE — 36415 COLL VENOUS BLD VENIPUNCTURE: CPT | Performed by: PHYSICIAN ASSISTANT

## 2023-10-25 PROCEDURE — 85730 THROMBOPLASTIN TIME PARTIAL: CPT | Performed by: PHYSICIAN ASSISTANT

## 2023-10-25 PROCEDURE — 87040 BLOOD CULTURE FOR BACTERIA: CPT | Performed by: PHYSICIAN ASSISTANT

## 2023-10-25 PROCEDURE — 99285 EMERGENCY DEPT VISIT HI MDM: CPT | Performed by: PHYSICIAN ASSISTANT

## 2023-10-25 PROCEDURE — 85049 AUTOMATED PLATELET COUNT: CPT | Performed by: INTERNAL MEDICINE

## 2023-10-25 PROCEDURE — 73630 X-RAY EXAM OF FOOT: CPT

## 2023-10-25 PROCEDURE — 84484 ASSAY OF TROPONIN QUANT: CPT | Performed by: PHYSICIAN ASSISTANT

## 2023-10-25 PROCEDURE — 80053 COMPREHEN METABOLIC PANEL: CPT | Performed by: PHYSICIAN ASSISTANT

## 2023-10-25 RX ORDER — PREGABALIN 75 MG/1
75 CAPSULE ORAL 2 TIMES DAILY
Status: DISCONTINUED | OUTPATIENT
Start: 2023-10-26 | End: 2023-10-27

## 2023-10-25 RX ORDER — INSULIN LISPRO 100 [IU]/ML
1-6 INJECTION, SOLUTION INTRAVENOUS; SUBCUTANEOUS
Status: DISCONTINUED | OUTPATIENT
Start: 2023-10-26 | End: 2023-10-31

## 2023-10-25 RX ORDER — ATORVASTATIN CALCIUM 10 MG/1
10 TABLET, FILM COATED ORAL DAILY
Status: DISCONTINUED | OUTPATIENT
Start: 2023-10-26 | End: 2023-10-27

## 2023-10-25 RX ORDER — ACETAMINOPHEN 325 MG/1
650 TABLET ORAL EVERY 6 HOURS PRN
Status: DISCONTINUED | OUTPATIENT
Start: 2023-10-25 | End: 2023-11-02 | Stop reason: HOSPADM

## 2023-10-25 RX ORDER — CEFEPIME HYDROCHLORIDE 2 G/50ML
2000 INJECTION, SOLUTION INTRAVENOUS EVERY 12 HOURS
Status: DISCONTINUED | OUTPATIENT
Start: 2023-10-25 | End: 2023-10-27

## 2023-10-25 RX ORDER — DULOXETIN HYDROCHLORIDE 20 MG/1
20 CAPSULE, DELAYED RELEASE ORAL 2 TIMES DAILY
Status: DISCONTINUED | OUTPATIENT
Start: 2023-10-26 | End: 2023-10-27

## 2023-10-25 RX ORDER — INSULIN GLARGINE 100 [IU]/ML
15 INJECTION, SOLUTION SUBCUTANEOUS
Status: DISCONTINUED | OUTPATIENT
Start: 2023-10-25 | End: 2023-10-26

## 2023-10-25 RX ORDER — TRAMADOL HYDROCHLORIDE 50 MG/1
50 TABLET ORAL EVERY 6 HOURS PRN
Status: DISCONTINUED | OUTPATIENT
Start: 2023-10-25 | End: 2023-11-02 | Stop reason: HOSPADM

## 2023-10-25 RX ORDER — HEPARIN SODIUM 5000 [USP'U]/ML
5000 INJECTION, SOLUTION INTRAVENOUS; SUBCUTANEOUS EVERY 8 HOURS SCHEDULED
Status: DISCONTINUED | OUTPATIENT
Start: 2023-10-25 | End: 2023-11-02 | Stop reason: HOSPADM

## 2023-10-25 RX ORDER — SODIUM CHLORIDE, SODIUM LACTATE, POTASSIUM CHLORIDE, CALCIUM CHLORIDE 600; 310; 30; 20 MG/100ML; MG/100ML; MG/100ML; MG/100ML
100 INJECTION, SOLUTION INTRAVENOUS CONTINUOUS
Status: DISCONTINUED | OUTPATIENT
Start: 2023-10-25 | End: 2023-10-26

## 2023-10-25 RX ADMIN — PIPERACILLIN AND TAZOBACTAM 3.38 G: 36; 4.5 INJECTION, POWDER, FOR SOLUTION INTRAVENOUS at 21:06

## 2023-10-25 RX ADMIN — SODIUM CHLORIDE 1000 ML: 0.9 INJECTION, SOLUTION INTRAVENOUS at 21:06

## 2023-10-25 RX ADMIN — IOHEXOL 85 ML: 350 INJECTION, SOLUTION INTRAVENOUS at 21:18

## 2023-10-26 ENCOUNTER — APPOINTMENT (INPATIENT)
Dept: VASCULAR ULTRASOUND | Facility: HOSPITAL | Age: 68
DRG: 854 | End: 2023-10-26
Payer: COMMERCIAL

## 2023-10-26 LAB
4HR DELTA HS TROPONIN: 1 NG/L
ANION GAP SERPL CALCULATED.3IONS-SCNC: 5 MMOL/L
ATRIAL RATE: 122 BPM
BACTERIA UR QL AUTO: ABNORMAL /HPF
BASOPHILS # BLD AUTO: 0.04 THOUSANDS/ÂΜL (ref 0–0.1)
BASOPHILS NFR BLD AUTO: 0 % (ref 0–1)
BILIRUB UR QL STRIP: NEGATIVE
BUN SERPL-MCNC: 22 MG/DL (ref 5–25)
CALCIUM SERPL-MCNC: 7.8 MG/DL (ref 8.4–10.2)
CARDIAC TROPONIN I PNL SERPL HS: 5 NG/L
CHLORIDE SERPL-SCNC: 101 MMOL/L (ref 96–108)
CLARITY UR: CLEAR
CO2 SERPL-SCNC: 23 MMOL/L (ref 21–32)
COLOR UR: ABNORMAL
CREAT SERPL-MCNC: 0.98 MG/DL (ref 0.6–1.3)
EOSINOPHIL # BLD AUTO: 0.09 THOUSAND/ÂΜL (ref 0–0.61)
EOSINOPHIL NFR BLD AUTO: 1 % (ref 0–6)
ERYTHROCYTE [DISTWIDTH] IN BLOOD BY AUTOMATED COUNT: 12.5 % (ref 11.6–15.1)
EST. AVERAGE GLUCOSE BLD GHB EST-MCNC: 375 MG/DL
GFR SERPL CREATININE-BSD FRML MDRD: 78 ML/MIN/1.73SQ M
GLUCOSE SERPL-MCNC: 274 MG/DL (ref 65–140)
GLUCOSE SERPL-MCNC: 319 MG/DL (ref 65–140)
GLUCOSE SERPL-MCNC: 321 MG/DL (ref 65–140)
GLUCOSE SERPL-MCNC: 364 MG/DL (ref 65–140)
GLUCOSE SERPL-MCNC: 375 MG/DL (ref 65–140)
GLUCOSE SERPL-MCNC: 402 MG/DL (ref 65–140)
GLUCOSE SERPL-MCNC: 424 MG/DL (ref 65–140)
GLUCOSE UR STRIP-MCNC: ABNORMAL MG/DL
HBA1C MFR BLD: 14.7 %
HCT VFR BLD AUTO: 35.9 % (ref 36.5–49.3)
HGB BLD-MCNC: 12.2 G/DL (ref 12–17)
HGB UR QL STRIP.AUTO: ABNORMAL
IMM GRANULOCYTES # BLD AUTO: 0.07 THOUSAND/UL (ref 0–0.2)
IMM GRANULOCYTES NFR BLD AUTO: 1 % (ref 0–2)
KETONES UR STRIP-MCNC: ABNORMAL MG/DL
LEUKOCYTE ESTERASE UR QL STRIP: NEGATIVE
LYMPHOCYTES # BLD AUTO: 1.56 THOUSANDS/ÂΜL (ref 0.6–4.47)
LYMPHOCYTES NFR BLD AUTO: 13 % (ref 14–44)
MCH RBC QN AUTO: 29 PG (ref 26.8–34.3)
MCHC RBC AUTO-ENTMCNC: 34 G/DL (ref 31.4–37.4)
MCV RBC AUTO: 85 FL (ref 82–98)
MONOCYTES # BLD AUTO: 1.25 THOUSAND/ÂΜL (ref 0.17–1.22)
MONOCYTES NFR BLD AUTO: 11 % (ref 4–12)
NEUTROPHILS # BLD AUTO: 8.8 THOUSANDS/ÂΜL (ref 1.85–7.62)
NEUTS SEG NFR BLD AUTO: 74 % (ref 43–75)
NITRITE UR QL STRIP: NEGATIVE
NON-SQ EPI CELLS URNS QL MICRO: ABNORMAL /HPF
NRBC BLD AUTO-RTO: 0 /100 WBCS
P AXIS: 54 DEGREES
PH UR STRIP.AUTO: 5.5 [PH]
PLATELET # BLD AUTO: 271 THOUSANDS/UL (ref 149–390)
PMV BLD AUTO: 9.8 FL (ref 8.9–12.7)
POTASSIUM SERPL-SCNC: 4.1 MMOL/L (ref 3.5–5.3)
PR INTERVAL: 158 MS
PROT UR STRIP-MCNC: ABNORMAL MG/DL
QRS AXIS: 107 DEGREES
QRSD INTERVAL: 68 MS
QT INTERVAL: 302 MS
QTC INTERVAL: 430 MS
RBC # BLD AUTO: 4.21 MILLION/UL (ref 3.88–5.62)
RBC #/AREA URNS AUTO: ABNORMAL /HPF
SODIUM SERPL-SCNC: 129 MMOL/L (ref 135–147)
SP GR UR STRIP.AUTO: >=1.05 (ref 1–1.03)
T WAVE AXIS: 76 DEGREES
UROBILINOGEN UR STRIP-ACNC: <2 MG/DL
VENTRICULAR RATE: 122 BPM
WBC # BLD AUTO: 11.81 THOUSAND/UL (ref 4.31–10.16)
WBC #/AREA URNS AUTO: ABNORMAL /HPF

## 2023-10-26 PROCEDURE — 83036 HEMOGLOBIN GLYCOSYLATED A1C: CPT | Performed by: INTERNAL MEDICINE

## 2023-10-26 PROCEDURE — 93923 UPR/LXTR ART STDY 3+ LVLS: CPT | Performed by: SURGERY

## 2023-10-26 PROCEDURE — 82948 REAGENT STRIP/BLOOD GLUCOSE: CPT

## 2023-10-26 PROCEDURE — 84484 ASSAY OF TROPONIN QUANT: CPT | Performed by: PHYSICIAN ASSISTANT

## 2023-10-26 PROCEDURE — 99233 SBSQ HOSP IP/OBS HIGH 50: CPT | Performed by: INTERNAL MEDICINE

## 2023-10-26 PROCEDURE — 85025 COMPLETE CBC W/AUTO DIFF WBC: CPT | Performed by: INTERNAL MEDICINE

## 2023-10-26 PROCEDURE — 93010 ELECTROCARDIOGRAM REPORT: CPT | Performed by: INTERNAL MEDICINE

## 2023-10-26 PROCEDURE — 36415 COLL VENOUS BLD VENIPUNCTURE: CPT | Performed by: PHYSICIAN ASSISTANT

## 2023-10-26 PROCEDURE — 80048 BASIC METABOLIC PNL TOTAL CA: CPT | Performed by: INTERNAL MEDICINE

## 2023-10-26 PROCEDURE — 93970 EXTREMITY STUDY: CPT | Performed by: SURGERY

## 2023-10-26 PROCEDURE — 81001 URINALYSIS AUTO W/SCOPE: CPT | Performed by: PHYSICIAN ASSISTANT

## 2023-10-26 PROCEDURE — 93970 EXTREMITY STUDY: CPT

## 2023-10-26 RX ORDER — INSULIN LISPRO 100 [IU]/ML
2 INJECTION, SOLUTION INTRAVENOUS; SUBCUTANEOUS
Status: DISCONTINUED | OUTPATIENT
Start: 2023-10-26 | End: 2023-10-26

## 2023-10-26 RX ORDER — INSULIN GLARGINE 100 [IU]/ML
20 INJECTION, SOLUTION SUBCUTANEOUS
Status: DISCONTINUED | OUTPATIENT
Start: 2023-10-26 | End: 2023-10-26

## 2023-10-26 RX ORDER — INSULIN LISPRO 100 [IU]/ML
3 INJECTION, SOLUTION INTRAVENOUS; SUBCUTANEOUS
Status: DISCONTINUED | OUTPATIENT
Start: 2023-10-26 | End: 2023-10-27

## 2023-10-26 RX ORDER — INSULIN GLARGINE 100 [IU]/ML
22 INJECTION, SOLUTION SUBCUTANEOUS
Status: DISCONTINUED | OUTPATIENT
Start: 2023-10-26 | End: 2023-10-27

## 2023-10-26 RX ADMIN — HEPARIN SODIUM 5000 UNITS: 5000 INJECTION INTRAVENOUS; SUBCUTANEOUS at 00:52

## 2023-10-26 RX ADMIN — INSULIN GLARGINE 15 UNITS: 100 INJECTION, SOLUTION SUBCUTANEOUS at 00:54

## 2023-10-26 RX ADMIN — HEPARIN SODIUM 5000 UNITS: 5000 INJECTION INTRAVENOUS; SUBCUTANEOUS at 11:07

## 2023-10-26 RX ADMIN — INSULIN LISPRO 6 UNITS: 100 INJECTION, SOLUTION INTRAVENOUS; SUBCUTANEOUS at 16:25

## 2023-10-26 RX ADMIN — PREGABALIN 75 MG: 75 CAPSULE ORAL at 10:45

## 2023-10-26 RX ADMIN — SODIUM CHLORIDE, SODIUM LACTATE, POTASSIUM CHLORIDE, AND CALCIUM CHLORIDE 100 ML/HR: .6; .31; .03; .02 INJECTION, SOLUTION INTRAVENOUS at 00:58

## 2023-10-26 RX ADMIN — INSULIN LISPRO 5 UNITS: 100 INJECTION, SOLUTION INTRAVENOUS; SUBCUTANEOUS at 13:29

## 2023-10-26 RX ADMIN — INSULIN LISPRO 5 UNITS: 100 INJECTION, SOLUTION INTRAVENOUS; SUBCUTANEOUS at 10:45

## 2023-10-26 RX ADMIN — CEFEPIME HYDROCHLORIDE 2000 MG: 2 INJECTION, SOLUTION INTRAVENOUS at 00:51

## 2023-10-26 RX ADMIN — VANCOMYCIN HYDROCHLORIDE 2000 MG: 1 INJECTION, POWDER, LYOPHILIZED, FOR SOLUTION INTRAVENOUS at 00:58

## 2023-10-26 RX ADMIN — CEFEPIME HYDROCHLORIDE 2000 MG: 2 INJECTION, SOLUTION INTRAVENOUS at 15:01

## 2023-10-26 RX ADMIN — INSULIN LISPRO 3 UNITS: 100 INJECTION, SOLUTION INTRAVENOUS; SUBCUTANEOUS at 16:25

## 2023-10-26 RX ADMIN — VANCOMYCIN HYDROCHLORIDE 750 MG: 750 INJECTION, SOLUTION INTRAVENOUS at 11:35

## 2023-10-26 RX ADMIN — ATORVASTATIN CALCIUM 10 MG: 10 TABLET, FILM COATED ORAL at 10:45

## 2023-10-26 RX ADMIN — VANCOMYCIN HYDROCHLORIDE 750 MG: 750 INJECTION, SOLUTION INTRAVENOUS at 23:00

## 2023-10-26 RX ADMIN — INSULIN GLARGINE 22 UNITS: 100 INJECTION, SOLUTION SUBCUTANEOUS at 22:34

## 2023-10-26 NOTE — ASSESSMENT & PLAN NOTE
Nonhealing ulceration of the left great toe, exacerbated by poorly controlled diabetes as evidenced by hemoglobin A1c of 14.7% as an outpatient  X-ray of the left great toe concerning for osteomyelitis at the distal aspect of the toe.     Continue IV vancomycin and cefepime  Follow-up podiatry recommendations  Suspect he will need surgical intervention

## 2023-10-26 NOTE — ED NOTES
Nancy Reyes is a 21 year old female presenting to the Urgent Care today for possible UTI. Pt reports burning, urgency, frequency, itching, lower back pain. Denies hematuria. Pt reports she got Macrobid from online source x5 days without resolution. Symptoms started last week Wednesday.     OTC use: Macrobid     Allergies and Medications were reviewed and updated if necessary.    Pharmacy reviewed and updated to Patient's preference.    Patient would like communication of their results via:        Cell Phone:   Telephone Information:   Mobile [phone]     Okay to leave a message containing results? Yes    Patient advised staff will contact with positive results only. Patient agrees. Patient instructed can also view results on [employer].   Patient provided with a bagged lunch.       Go Zelaya RN  10/25/23 [phone]

## 2023-10-26 NOTE — ED PROVIDER NOTES
History  Chief Complaint   Patient presents with    Dizziness     Pt reports having trembling sensations for the last few months and feeling like he's been falling forward and backwards with severe dizziness. Pt reports difficulty ambulating and pts wife reports he was trembling so bad last night the whole bed was shaking. Pt reports that he is freezing. +Nausea no vomiting. 75 yo with here with wound of left foot first phalanx. Wound has been there for a while but appearance has changed in the past week. Worsening redness. Pain. Difficulty walking. Also feeling lightheaded and dizziness. History provided by:  Patient   used: No        Prior to Admission Medications   Prescriptions Last Dose Informant Patient Reported? Taking?    DULoxetine (CYMBALTA) 20 mg capsule Not Taking Self Yes No   Sig: Take 20 mg by mouth 2 (two) times a day   Patient not taking: Reported on 10/26/2023   atorvastatin (LIPITOR) 10 mg tablet Not Taking Self Yes No   Sig: Take 10 mg by mouth daily   Patient not taking: Reported on 10/26/2023   famotidine (PEPCID) 20 mg tablet Not Taking Self No No   Sig: Take 1 tablet by mouth 2 (two) times a day as needed for heartburn   Patient not taking: Reported on 6/16/2019   glipiZIDE (GLUCOTROL) 10 mg tablet Not Taking Self Yes No   Sig: Take 10 mg by mouth once   Patient not taking: Reported on 10/26/2023   insulin aspart (NOVOLOG FLEXPEN) 100 Units/mL injection pen 10/26/2023 Self Yes Yes   Sig: Inject 7 Units under the skin   insulin glargine (LANTUS SOLOSTAR) 100 units/mL injection pen 10/25/2023 Self Yes Yes   Sig: Inject 15 Units under the skin daily   metFORMIN (GLUCOPHAGE) 500 mg tablet Not Taking Self Yes No   Sig: Take 500 mg by mouth 2 (two) times a day with meals   Patient not taking: Reported on 10/26/2023   pregabalin (LYRICA) 75 mg capsule Not Taking Self Yes No   Sig: Take 75 mg by mouth 2 (two) times a day   Patient not taking: Reported on 10/26/2023 traMADol (ULTRAM) 50 mg tablet Not Taking Self No No   Sig: Take 1 tablet (50 mg total) by mouth every 6 (six) hours as needed for moderate pain   Patient not taking: Reported on 2/24/2020      Facility-Administered Medications: None       Past Medical History:   Diagnosis Date    Diabetes mellitus ([address])     Prostate cancer [facility])        Past Surgical History:   Procedure Laterality Date    PROSTATE SURGERY         History reviewed. No pertinent family history. I have reviewed and agree with the history as documented. E-Cigarette/Vaping     E-Cigarette/Vaping Substances     Social History     Tobacco Use    Smoking status: Former    Smokeless tobacco: Never   Substance Use Topics    Alcohol use: Never    Drug use: No       Review of Systems   Constitutional:  Negative for chills and fever. HENT:  Negative for ear pain and sore throat. Eyes:  Negative for pain and visual disturbance. Respiratory:  Negative for cough and shortness of breath. Cardiovascular:  Negative for chest pain and palpitations. Gastrointestinal:  Negative for abdominal pain and vomiting. Genitourinary:  Negative for dysuria and hematuria. Musculoskeletal:  Negative for arthralgias and back pain. Skin:  Positive for wound. Negative for color change and rash. Neurological:  Positive for dizziness. Negative for seizures and syncope. All other systems reviewed and are negative. Physical Exam  Physical Exam  Vitals and nursing note reviewed. Constitutional:       General: He is not in acute distress. Appearance: He is well-developed. HENT:      Head: Normocephalic and atraumatic. Eyes:      Conjunctiva/sclera: Conjunctivae normal.   Cardiovascular:      Rate and Rhythm: Normal rate and regular rhythm. Heart sounds: No murmur heard. Pulmonary:      Effort: Pulmonary effort is normal. No respiratory distress. Breath sounds: Normal breath sounds. Abdominal:      Palpations: Abdomen is soft. Tenderness: There is no abdominal tenderness. Musculoskeletal:         General: No swelling. Cervical back: Neck supple. Legs:    Skin:     General: Skin is warm and dry. Capillary Refill: Capillary refill takes less than 2 seconds. Neurological:      Mental Status: He is alert.    Psychiatric:         Mood and Affect: Mood normal.         Vital Signs  ED Triage Vitals   Temperature Pulse Respirations Blood Pressure SpO2   10/25/23 1918 10/25/23 1918 10/25/23 1918 10/25/23 1918 10/25/23 1918   98.7 °F (37.1 °C) (!) 121 18 139/73 97 %      Temp Source Heart Rate Source Patient Position - Orthostatic VS BP Location FiO2 (%)   10/25/23 1918 10/25/23 1918 10/25/23 1918 10/25/23 1918 --   Temporal Monitor Sitting Left arm       Pain Score       10/26/23 1045       No Pain           Vitals:    10/31/23 0915 10/31/23 0955 10/31/23 1100 10/31/23 1427   BP: 139/77 137/71 137/70 149/82   Pulse: 64 68 70 70   Patient Position - Orthostatic VS:  Sitting  Lying         Visual Acuity  Visual Acuity      Flowsheet Row Most Recent Value   L Pupil Size (mm) 3   R Pupil Size (mm) 3            ED Medications  Medications   acetaminophen (TYLENOL) tablet 650 mg ( Oral MAR Hold 10/31/23 0713)   heparin (porcine) subcutaneous injection 5,000 Units ( Subcutaneous Dose Auto Held 11/3/23 2200)   insulin lispro (HumaLOG) 100 units/mL subcutaneous injection 1-6 Units ( Subcutaneous Dose Auto Held 11/4/23 1600)   traMADol (ULTRAM) tablet 50 mg ( Oral MAR Hold 10/31/23 0713)   insulin glargine (LANTUS) subcutaneous injection 35 Units 0.35 mL ( Subcutaneous Dose Auto Held 11/4/23 2200)   DAPTOmycin (CUBICIN) 500 mg in sodium chloride 0.9 % 50 mL IVPB ( Intravenous Dose Auto Held 11/4/23 1245)   insulin lispro (HumaLOG) 100 units/mL subcutaneous injection 5 Units ( Subcutaneous Dose Auto Held 11/5/23 1630)   piperacillin-tazobactam (ZOSYN) 3.375 g in sodium chloride 0.9 % 100 mL IVPB (0 g Intravenous Stopped 10/25/23 2231) sodium chloride 0.9 % bolus 1,000 mL (0 mL Intravenous Stopped 10/25/23 2306)   iohexol (OMNIPAQUE) 350 MG/ML injection (MULTI-DOSE) 85 mL (85 mL Intravenous Given 10/25/23 2118)   vancomycin (VANCOCIN) 2,000 mg in sodium chloride 0.9 % 500 mL IVPB (0 mg Intravenous Stopped 10/26/23 0337)   Gadobutrol injection (SINGLE-DOSE) SOLN 8 mL (8 mL Intravenous Given 10/28/23 1151)       Diagnostic Studies  Results Reviewed       Procedure Component Value Units Date/Time    Blood culture #1 [[id number]] Collected: 10/25/23 2030    Lab Status: Final result Specimen: Blood from Arm, Right Updated: 10/30/23 2301     Blood Culture No Growth After 5 Days. Blood culture #2 [[id number]] Collected: 10/25/23 2033    Lab Status: Final result Specimen: Blood from Hand, Right Updated: 10/30/23 2301     Blood Culture No Growth After 5 Days.     Basic metabolic panel [[id number]]  (Abnormal) Collected: 10/27/23 0620    Lab Status: Final result Specimen: Blood from Hand, Left Updated: 10/27/23 0704     Sodium 132 mmol/L      Potassium 3.9 mmol/L      Chloride 103 mmol/L      CO2 24 mmol/L      ANION GAP 5 mmol/L      BUN 16 mg/dL      Creatinine 0.79 mg/dL      Glucose 279 mg/dL      Calcium 7.5 mg/dL      eGFR 92 ml/min/1.73sq m     Narrative:      [facility] guidelines for Chronic Kidney Disease (CKD):     Stage 1 with normal or high GFR (GFR > 90 mL/min/1.73 square meters)    Stage 2 Mild CKD (GFR = 60-89 mL/min/1.73 square meters)    Stage 3A Moderate CKD (GFR = 45-59 mL/min/1.73 square meters)    Stage 3B Moderate CKD (GFR = 30-44 mL/min/1.73 square meters)    Stage 4 Severe CKD (GFR = 15-29 mL/min/1.73 square meters)    Stage 5 End Stage CKD (GFR <15 mL/min/1.73 square meters)  Note: GFR calculation is accurate only with a steady state creatinine    Magnesium [[id number]]  (Normal) Collected: 10/27/23 0620    Lab Status: Final result Specimen: Blood from Hand, Left Updated: 10/27/23 0704     Magnesium 2.0 mg/dL     Vancomycin, random [[id number]]  (Abnormal) Collected: 10/27/23 0620    Lab Status: Final result Specimen: Blood from Hand, Left Updated: 10/27/23 0703     Vancomycin Rm 8.8 ug/mL     CBC [[id number]]  (Abnormal) Collected: 10/27/23 0620    Lab Status: Final result Specimen: Blood from Hand, Left Updated: 10/27/23 0645     WBC 8.09 Thousand/uL      RBC 4.03 Million/uL      Hemoglobin 11.7 g/dL      Hematocrit 33.5 %      MCV 83 fL      MCH 29.0 pg      MCHC 34.9 g/dL      RDW 12.4 %      Platelets 383 Thousands/uL      MPV 9.6 fL     Fingerstick Glucose (POCT) [[id number]]  (Abnormal) Collected: 10/26/23 1534    Lab Status: Final result Updated: 10/26/23 1536     POC Glucose 424 mg/dl     Fingerstick Glucose (POCT) [[id number]]  (Abnormal) Collected: 10/26/23 1309    Lab Status: Final result Updated: 10/26/23 1321     POC Glucose 319 mg/dl     Fingerstick Glucose (POCT) [[id number]]  (Abnormal) Collected: 10/26/23 1053    Lab Status: Final result Updated: 10/26/23 1152     POC Glucose 375 mg/dl     Hemoglobin A1c w/EAG Estimation (Orders if not completed within the last 90 days) [[id number]]  (Abnormal) Collected: 10/26/23 0128    Lab Status: Final result Specimen: Blood from Arm, Right Updated: 10/26/23 1135     Hemoglobin A1C 14.7 %       mg/dl     Fingerstick Glucose (POCT) [[id number]]  (Abnormal) Collected: 10/26/23 0805    Lab Status: Final result Updated: 10/26/23 0808     POC Glucose 321 mg/dl     Basic metabolic panel [[id number]]  (Abnormal) Collected: 10/26/23 0451    Lab Status: Final result Specimen: Blood from Arm, Right Updated: 10/26/23 0517     Sodium 129 mmol/L      Potassium 4.1 mmol/L      Chloride 101 mmol/L      CO2 23 mmol/L      ANION GAP 5 mmol/L      BUN 22 mg/dL      Creatinine 0.98 mg/dL      Glucose 364 mg/dL      Calcium 7.8 mg/dL      eGFR 78 ml/min/1.73sq m     Narrative:      [facility] guidelines for [facility] (CKD):     Stage 1 with normal or high GFR (GFR > 90 mL/min/1.73 square meters)    Stage 2 Mild CKD (GFR = 60-89 mL/min/1.73 square meters)    Stage 3A Moderate CKD (GFR = 45-59 mL/min/1.73 square meters)    Stage 3B Moderate CKD (GFR = 30-44 mL/min/1.73 square meters)    Stage 4 Severe CKD (GFR = 15-29 mL/min/1.73 square meters)    Stage 5 End Stage CKD (GFR <15 mL/min/1.73 square meters)  Note: GFR calculation is accurate only with a steady state creatinine    CBC and differential [[id number]]  (Abnormal) Collected: 10/26/23 0451    Lab Status: Final result Specimen: Blood from Arm, Right Updated: 10/26/23 0503     WBC 11.81 Thousand/uL      RBC 4.21 Million/uL      Hemoglobin 12.2 g/dL      Hematocrit 35.9 %      MCV 85 fL      MCH 29.0 pg      MCHC 34.0 g/dL      RDW 12.5 %      MPV 9.8 fL      Platelets 865 Thousands/uL      nRBC 0 /100 WBCs      Neutrophils Relative 74 %      Immat GRANS % 1 %      Lymphocytes Relative 13 %      Monocytes Relative 11 %      Eosinophils Relative 1 %      Basophils Relative 0 %      Neutrophils Absolute 8.80 Thousands/µL      Immature Grans Absolute 0.07 Thousand/uL      Lymphocytes Absolute 1.56 Thousands/µL      Monocytes Absolute 1.25 Thousand/µL      Eosinophils Absolute 0.09 Thousand/µL      Basophils Absolute 0.04 Thousands/µL     Urine Microscopic [[id number]]  (Abnormal) Collected: 10/26/23 0108    Lab Status: Final result Specimen: Urine, Clean Catch Updated: 10/26/23 0209     RBC, UA 4-10 /hpf      WBC, UA 1-2 /hpf      Epithelial Cells Occasional /hpf      Bacteria, UA None Seen /hpf     UA w Reflex to Microscopic w Reflex to Culture [[id number]]  (Abnormal) Collected: 10/26/23 0108    Lab Status: Final result Specimen: Urine, Clean Catch Updated: 10/26/23 0209     Color, UA Light Yellow     Clarity, UA Clear     Specific Gravity, UA >=1.050     pH, UA 5.5     Leukocytes, UA Negative     Nitrite, UA Negative     Protein, UA 30 (1+) mg/dl      Glucose,  (3/10%) mg/dl      Ketones, UA 20 (1+) mg/dl      Urobilinogen, UA <2.0 mg/dl      Bilirubin, UA Negative     Occult Blood, UA Trace    HS Troponin I 4hr [[id number]]  (Normal) Collected: 10/26/23 0108    Lab Status: Final result Specimen: Blood from Arm, Right Updated: 10/26/23 0137     hs TnI 4hr 5 ng/L      Delta 4hr hsTnI 1 ng/L     Fingerstick Glucose (POCT) [[id number]]  (Abnormal) Collected: 10/26/23 0050    Lab Status: Final result Updated: 10/26/23 0110     POC Glucose 402 mg/dl     HS Troponin I 2hr [[id number]]  (Normal) Collected: 10/25/23 2301    Lab Status: Final result Specimen: Blood from Arm, Right Updated: 10/25/23 2337     hs TnI 2hr 5 ng/L      Delta 2hr hsTnI 1 ng/L     Platelet count [[id number]]  (Normal) Collected: 10/25/23 2301    Lab Status: Final result Specimen: Blood from Arm, Right Updated: 10/25/23 2313     Platelets 790 Thousands/uL      MPV 9.8 fL     Procalcitonin [[id number]]  (Abnormal) Collected: 10/25/23 2030    Lab Status: Final result Specimen: Blood from Arm, Right Updated: 10/25/23 2122     Procalcitonin 1.11 ng/ml     Protime-INR [[id number]]  (Abnormal) Collected: 10/25/23 2030    Lab Status: Final result Specimen: Blood from Arm, Right Updated: 10/25/23 2113     Protime 14.7 seconds      INR 1.08    APTT [[id number]]  (Normal) Collected: 10/25/23 2030    Lab Status: Final result Specimen: Blood from Arm, Right Updated: 10/25/23 2113     PTT 32 seconds     HS Troponin 0hr (reflex protocol) [[id number]]  (Normal) Collected: 10/25/23 2030    Lab Status: Final result Specimen: Blood from Arm, Right Updated: 10/25/23 2110     hs TnI 0hr 4 ng/L     Lactic acid [[id number]]  (Normal) Collected: 10/25/23 2030    Lab Status: Final result Specimen: Blood from Arm, Right Updated: 10/25/23 2105     LACTIC ACID 1.0 mmol/L     Narrative:      Result may be elevated if tourniquet was used during collection.     Comprehensive metabolic panel [[id number]]  (Abnormal) Collected: 10/25/23 2030    Lab Status: Final result Specimen: Blood from Arm, Right Updated: 10/25/23 2101     Sodium 128 mmol/L      Potassium 4.2 mmol/L      Chloride 95 mmol/L      CO2 21 mmol/L      ANION GAP 12 mmol/L      BUN 25 mg/dL      Creatinine 1.10 mg/dL      Glucose 309 mg/dL      Calcium 8.4 mg/dL      Corrected Calcium 9.0 mg/dL      AST 13 U/L      ALT 11 U/L      Alkaline Phosphatase 98 U/L      Total Protein 7.5 g/dL      Albumin 3.2 g/dL      Total Bilirubin 0.63 mg/dL      eGFR 68 ml/min/1.73sq m     Narrative:      [place] guidelines for Chronic Kidney Disease (CKD):     Stage 1 with normal or high GFR (GFR > 90 mL/min/1.73 square meters)    Stage 2 Mild CKD (GFR = 60-89 mL/min/1.73 square meters)    Stage 3A Moderate CKD (GFR = 45-59 mL/min/1.73 square meters)    Stage 3B Moderate CKD (GFR = 30-44 mL/min/1.73 square meters)    Stage 4 Severe CKD (GFR = 15-29 mL/min/1.73 square meters)    Stage 5 End Stage CKD (GFR <15 mL/min/1.73 square meters)  Note: GFR calculation is accurate only with a steady state creatinine    CBC and differential [[id number]]  (Abnormal) Collected: 10/25/23 2030    Lab Status: Final result Specimen: Blood from Arm, Right Updated: 10/25/23 2042     WBC 14.49 Thousand/uL      RBC 4.55 Million/uL      Hemoglobin 13.1 g/dL      Hematocrit 38.6 %      MCV 85 fL      MCH 28.8 pg      MCHC 33.9 g/dL      RDW 12.2 %      MPV 9.8 fL      Platelets 842 Thousands/uL      nRBC 0 /100 WBCs      Neutrophils Relative 85 %      Immat GRANS % 1 %      Lymphocytes Relative 7 %      Monocytes Relative 7 %      Eosinophils Relative 0 %      Basophils Relative 0 %      Neutrophils Absolute 12.32 Thousands/µL      Immature Grans Absolute 0.07 Thousand/uL      Lymphocytes Absolute 1.00 Thousands/µL      Monocytes Absolute 1.04 Thousand/µL      Eosinophils Absolute 0.00 Thousand/µL      Basophils Absolute 0.06 Thousands/µL                    XR foot 3+ vw left   Final Result by Yuri Shipley MD (10/31 1041)      Postsurgical changes of first digit amputation. I have personally reviewed this study including all images.  / ABDULKADIR Resident: Jolinda Phoenix, the attending radiologist, have reviewed the images and agree with the final report above. Workstation performed: [id number]         MRI foot/forefoot toes left w wo contrast   Final Result by Cesar Castellanos MD (10/29 1017)      Soft tissue ulcer plantar aspect of the distal great toe at the level of the interphalangeal joint. Extensive osteomyelitis involving the great toe distal phalanx and proximal phalanx. Workstation performed: [id number]         VAS SHITAL & waveform analysis, multiple levels   Final Result by Anshu Toribio DO (10/27 2208)      VAS lower limb venous duplex study, complete bilateral   Final Result by Shelly Kline MD (10/26 1452)      CTA head and neck w wo contrast   Final Result by Michel Sun MD (10/25 2203)      No evidence of acute intracranial hemorrhage. No evidence of hemodynamic significant stenosis, aneurysm or dissection. Workstation performed: [id number]         XR foot 3+ views LEFT   ED Interpretation by Sunni Roth PA-C (10/25 2052)   Concerning changes of first phalanx for osteo      Final Result by Danilo Aldana MD (10/26 1436)      Findings concerning for osteomyelitis of the distal phalanx of the first digit. Interpretation concordant with preliminary findings from the emergency department/urgent care. Workstation performed: [id number]                    Procedures  Procedures         ED Course                               SBIRT 22yo+      Flowsheet Row Most Recent Value   Initial Alcohol Screen: US AUDIT-C     1. How often do you have a drink containing alcohol? 0 Filed at: 10/25/2023 1923   2. How many drinks containing alcohol do you have on a typical day you are drinking? 0 Filed at: 10/25/2023 1923   3b. FEMALE Any Age, or MALE 65+:  How often do you have 4 or more drinks on one occassion? 0 Filed at: 10/25/2023 1923   Audit-C Score 0 Filed at: 10/25/2023 5157   BHARATHI: How many times in the past year have you. .. Used an illegal drug or used a prescription medication for non-medical reasons? Never Filed at: 10/25/2023 1923                      Medical Decision Making  Ddx includes but is not limited to cellulitis, abscess, osteo, CVA, TIA, dehydration, metabolic abnormality. Plan: CT head. Xr foot. Labs. Dispo pending    MDM: 75 yo with foot wound. Concern for osteo. Start abx. Plan to admit. Pt in agreement. Stable at time of admit. Amount and/or Complexity of Data Reviewed  Labs: ordered. Radiology: ordered and independent interpretation performed. Risk  Prescription drug management. Decision regarding hospitalization.              Disposition  Final diagnoses:   Cellulitis of left foot     Time reflects when diagnosis was documented in both MDM as applicable and the Disposition within this note       Time User Action Codes Description Comment    10/25/2023 10:23 PM Ashley Mohan Add [L03.116] Cellulitis of left foot     10/25/2023 10:41 PM Luisa Childers Add [W87.538,  D79.328] Cellulitis in diabetic foot      10/27/2023 11:37 AM Julianne Garcia Add [T58.552,  L97.529] Diabetic ulcer of left great toe ([address])     10/29/2023  8:40 AM Riccardo Bobo Add [E11.65,  Z79.4] Type 2 diabetes mellitus with hyperglycemia, with long-term current use of insulin ([address])     10/31/2023  8:18 AM Unknown Blind Modify [T39.398,  L97.529] Diabetic ulcer of left great toe ([address])     10/31/2023 10:06 AM Lathan Lefort Add [M86.9] Osteomyelitis ([address])     10/31/2023 10:06 AM Cesario Wallerort Add [Z22.322] MRSA (methicillin resistant staph aureus) culture positive           ED Disposition       ED Disposition   Admit    Condition   Stable    Date/Time   Wed Oct 25, 2023 2224    Comment   Case was discussed with Dr. Marizol Salas and the patient's admission status was agreed to be Admission Status: inpatient status to the service of Dr. Paige Contreras . Follow-up Information    None         Current Discharge Medication List        CONTINUE these medications which have NOT CHANGED    Details   insulin aspart (NOVOLOG FLEXPEN) 100 Units/mL injection pen Inject 7 Units under the skin      insulin glargine (LANTUS SOLOSTAR) 100 units/mL injection pen Inject 15 Units under the skin daily      atorvastatin (LIPITOR) 10 mg tablet Take 10 mg by mouth daily      DULoxetine (CYMBALTA) 20 mg capsule Take 20 mg by mouth 2 (two) times a day      famotidine (PEPCID) 20 mg tablet Take 1 tablet by mouth 2 (two) times a day as needed for heartburn  Qty: 20 tablet, Refills: 0      glipiZIDE (GLUCOTROL) 10 mg tablet Take 10 mg by mouth once      metFORMIN (GLUCOPHAGE) 500 mg tablet Take 500 mg by mouth 2 (two) times a day with meals      pregabalin (LYRICA) 75 mg capsule Take 75 mg by mouth 2 (two) times a day      traMADol (ULTRAM) 50 mg tablet Take 1 tablet (50 mg total) by mouth every 6 (six) hours as needed for moderate pain  Qty: 8 tablet, Refills: 0    Associated Diagnoses: Knee pain, right; Osteoarthritis of right knee             No discharge procedures on file.     PDMP Review       None            ED Provider  Electronically Signed by             Wilma Pearson PA-C  10/31/23 3282

## 2023-10-26 NOTE — ASSESSMENT & PLAN NOTE
Patient still with pseudohyponatremia second to hyperglycemia  Continue to titrate insulin for adequate blood glucose control  Recheck BMP in the morning

## 2023-10-26 NOTE — ASSESSMENT & PLAN NOTE
Lab Results   Component Value Date    HGBA1C 6.0 (H) 10/25/2019       No results for input(s): "POCGLU" in the last 72 hours.     Blood Sugar Average: Last 72 hrs:  Blood sugar uncontrolled, patient states being compliant with insulin  Check A1c  Insulin sliding scale  May need to titrate home insulin dose

## 2023-10-26 NOTE — H&P
VTE Pharmacologic Prophylaxis:   Moderate Risk (Score 3-4) - Pharmacological DVT Prophylaxis Ordered: heparin. Code Status: Level 3 - DNAR and DNI discussed with patient  Discussion with family:   . Anticipated Length of Stay: Patient will be admitted on an inpatient basis with an anticipated length of stay of greater than 2 midnights secondary to hyperglycemia, possible osteomyelitis. Total Time Spent on Date of Encounter in care of patient: 45 mins. This time was spent on one or more of the following: performing physical exam; counseling and coordination of care; obtaining or reviewing history; documenting in the medical record; reviewing/ordering tests, medications or procedures; communicating with other healthcare professionals and discussing with patient's family/caregivers. Chief Complaint: Dizziness/presyncope    History of Present Illness:  Linda Harrell is a 76 y.o. male with a PMH of type 2 diabetes on insulin with neuropathy and nonhealing left great toe ulcer who presents with multiple complaints. Patient states that for the last several days he has been feeling weak, dizzy especially when he stands up. Is also having tingling of his hands and feet. He states been compliant with his insulin. He also noticed some chills especially at night for the last few days. His left toe has not healed though he has been seeing a podiatrist.  His left leg started getting swollen. Today when he tried to get up he could not and almost fell/passed out hence he decided to come to ER. Other than that he denies any chest pain, palpitation. No nausea, vomiting. No urinary symptoms or diarrhea. In ER he was found to have hyperglycemia with pseudohyponatremia. Sepsis with tachycardia and leukocytosis. Left toe ulcer with cellulitis. His left x-ray pending  Doppler ultrasound ordered. .    Review of Systems:  Review of Systems 10 point negative except for that mentioned in HPI    Past Medical and Surgical History:   Past Medical History:   Diagnosis Date    Diabetes mellitus ([address])     Prostate cancer [facility])        Past Surgical History:   Procedure Laterality Date    PROSTATE SURGERY         Meds/Allergies:  Prior to Admission medications    Medication Sig Start Date End Date Taking? Authorizing Provider   atorvastatin (LIPITOR) 10 mg tablet Take 10 mg by mouth daily    Historical Provider, MD   DULoxetine (CYMBALTA) 20 mg capsule Take 20 mg by mouth 2 (two) times a day    Historical Provider, MD   famotidine (PEPCID) 20 mg tablet Take 1 tablet by mouth 2 (two) times a day as needed for heartburn  Patient not taking: Reported on 6/16/2019 12/28/16   Adalberto Duran MD   glipiZIDE (GLUCOTROL) 10 mg tablet Take 10 mg by mouth once    Historical Provider, MD   insulin aspart (NOVOLOG FLEXPEN) 100 Units/mL injection pen Inject 7 Units under the skin 5/30/19   Historical Provider, MD   insulin glargine (LANTUS SOLOSTAR) 100 units/mL injection pen Inject 15 Units under the skin daily 5/30/19   Historical Provider, MD   metFORMIN (GLUCOPHAGE) 500 mg tablet Take 500 mg by mouth 2 (two) times a day with meals    Historical Provider, MD   pregabalin (LYRICA) 75 mg capsule Take 75 mg by mouth 2 (two) times a day    Historical Provider, MD   traMADol (ULTRAM) 50 mg tablet Take 1 tablet (50 mg total) by mouth every 6 (six) hours as needed for moderate pain  Patient not taking: Reported on 2/24/2020 6/16/19   Abram Bran PA-C     I have reviewed home medications with patient personally. Allergies:    Allergies   Allergen Reactions    Codeine GI Intolerance    Lisinopril        Social History:  Marital Status: /Civil Union   Occupation:   Patient Pre-hospital Living Situation: Home  Patient Pre-hospital Level of Mobility: walks  Patient Pre-hospital Diet Restrictions:   Substance Use History:   Social History     Substance and Sexual Activity   Alcohol Use No     Social History     Tobacco Use   Smoking Status Former   Smokeless Tobacco Never     Social History     Substance and Sexual Activity   Drug Use No       Family History:  History reviewed. No pertinent family history. Physical Exam:     Vitals:   Blood Pressure: 123/67 (10/25/23 2251)  Pulse: 94 (10/25/23 2251)  Temperature: 98.7 °F (37.1 °C) (10/25/23 1918)  Temp Source: Temporal (10/25/23 1918)  Respirations: (!) 23 (10/25/23 2251)  Height: 5' 10" (177.8 cm) (10/25/23 0259)  Weight - Scale: 86.2 kg (190 lb) (10/25/23 2058)  SpO2: 94 % (10/25/23 2251)    Physical Exam  Constitutional:       Appearance: Normal appearance. HENT:      Head: Normocephalic. Nose: Nose normal.      Mouth/Throat:      Mouth: Mucous membranes are moist.   Eyes:      Pupils: Pupils are equal, round, and reactive to light. Cardiovascular:      Rate and Rhythm: Tachycardia present. Pulmonary:      Effort: Pulmonary effort is normal.      Breath sounds: Normal breath sounds. Abdominal:      General: Abdomen is flat. Bowel sounds are normal.      Palpations: Abdomen is soft. Musculoskeletal:         General: Normal range of motion. Cervical back: Normal range of motion. Comments: Right toe with nonhealing ulcer, currently dressing applied  Left lower extremity pedal edema   Skin:     General: Skin is warm. Neurological:      General: No focal deficit present. Mental Status: He is alert.    Psychiatric:         Mood and Affect: Mood normal.          Additional Data:     Lab Results:  Results from last 7 days   Lab Units 10/25/23  2030   WBC Thousand/uL 14.49*   HEMOGLOBIN g/dL 13.1   HEMATOCRIT % 38.6   PLATELETS Thousands/uL 330   NEUTROS PCT % 85*   LYMPHS PCT % 7*   MONOS PCT % 7   EOS PCT % 0     Results from last 7 days   Lab Units 10/25/23  2030   SODIUM mmol/L 128*   POTASSIUM mmol/L 4.2   CHLORIDE mmol/L 95*   CO2 mmol/L 21   BUN mg/dL 25   CREATININE mg/dL 1.10   ANION GAP mmol/L 12   CALCIUM mg/dL 8.4   ALBUMIN g/dL 3.2*   TOTAL BILIRUBIN mg/dL 0.63   ALK PHOS U/L 98   ALT U/L 11   AST U/L 13   GLUCOSE RANDOM mg/dL 309*     Results from last 7 days   Lab Units 10/25/23  2030   INR  1.08             Results from last 7 days   Lab Units 10/25/23  2030   LACTIC ACID mmol/L 1.0   PROCALCITONIN ng/ml 1.11*       Lines/Drains:  Invasive Devices       Peripheral Intravenous Line  Duration             Peripheral IV 10/25/23 Distal;Right;Upper;Ventral (anterior) Arm <1 day                        Imaging: Personally reviewed the following imaging: Left foot  CTA head and neck w wo contrast   Final Result by Evelin Soto MD (10/25 2203)      No evidence of acute intracranial hemorrhage. No evidence of hemodynamic significant stenosis, aneurysm or dissection. Workstation performed: [id number]         XR foot 3+ views LEFT   ED Interpretation by Cristofer Solis PA-C (10/25 2052)   Concerning changes of first phalanx for osteo      VAS lower limb venous duplex study, complete bilateral    (Results Pending)       EKG and Other Studies Reviewed on Admission:   EKG: Sinus Tachycardia. . ** Please Note: This note has been constructed using a voice recognition system. **  [address]  H&P  Name: Milton Case 76 y.o. male I MRN: [id number]  Unit/Bed#: ED 20 I Date of Admission: 10/25/2023   Date of Service: 10/25/2023 I Hospital Day: 0      Assessment/Plan   Vertigo  Assessment & Plan  Patient complains of generalized dizziness ongoing for few days more pronounced today.   He is not very specific about dizziness, states he feels like his legs could not hold him and he was having difficulty walking-balancing  At this time he feels okay  CTA head and neck negative  Likely related to hyperglycemia, sepsis  Once blood pressure under control and continuing treatment for sepsis with still has ongoing dizziness may consider MRI of the brain  Orthostatic hypotension    Pseudohyponatremia  Assessment & Plan  Likely secondary to hyperglycemia  Recheck sodium after IV fluids and insulin    * Diabetic ulcer of left great toe [facility])  Assessment & Plan  Lab Results   Component Value Date    HGBA1C 6.0 (H) 10/25/2019       No results for input(s): "POCGLU" in the last 72 hours. Blood Sugar Average: Last 72 hrs:    Patient with ongoing nonhealing left great toe ulcer. Dressing which was changed in ER, discussed with ER physician seems ulcer in the distal base did not do probe testing. X-ray pending. Possibility of osteomyelitis  Continue vanco/cefepime  Follow-up x-ray report may need MRI  Podiatry consult  Recheck A1c  Tight glycemic control    Diabetes mellitus ([address])  Assessment & Plan  Lab Results   Component Value Date    HGBA1C 6.0 (H) 10/25/2019       No results for input(s): "POCGLU" in the last 72 hours.     Blood Sugar Average: Last 72 hrs:  Blood sugar uncontrolled, patient states being compliant with insulin  Check A1c  Insulin sliding scale  May need to titrate home insulin dose

## 2023-10-26 NOTE — PROGRESS NOTES
Kelly Tubbs is a 76 y.o. male who is currently ordered Vancomycin IV with management by the Pharmacy Consult service. Relevant clinical data and objective / subjective history reviewed. Vancomycin Assessment:  Indication and Goal AUC/Trough: Bone/joint infection (goal -600, trough >10)  Clinical Status:  new start  Micro:   pending  Renal Function:  SCr: 1.1 mg/dL  CrCl: 66.4 mL/min  Renal replacement: Not on dialysis  Days of Therapy: 1  Current Dose: 1250mg IV q12h  Vancomycin Plan:  New Dosinmg IV once then 750mg IV q12h  Estimated AUC: 463 mcg*hr/mL  Estimated Trough: 15.6 mcg/mL  Next Level: 10/27/23 AM labs  Renal Function Monitoring: Daily BMP and [place] will continue to follow closely for s/sx of nephrotoxicity, infusion reactions and appropriateness of therapy. BMP and CBC will be ordered per protocol. We will continue to follow the patient’s culture results and clinical progress daily.     Edgardo Llamas, Pharmacist

## 2023-10-26 NOTE — ED NOTES
L great toe dressing changed due to serous drainage. Pt tolerated well. Tissue surrounding wound macerated. Dry gauze applied to macerated area. Xeroform applied to wound bed. Toe wrapped in kerlex.  Pt tolerated well      Fatuma Ron RN  10/26/23 2894

## 2023-10-26 NOTE — ASSESSMENT & PLAN NOTE
Patient complains of generalized dizziness ongoing for few days more pronounced today.   He is not very specific about dizziness, states he feels like his legs could not hold him and he was having difficulty walking-balancing  At this time he feels okay  CTA head and neck negative  Likely related to hyperglycemia, sepsis  Once blood pressure under control and continuing treatment for sepsis with still has ongoing dizziness may consider MRI of the brain  Orthostatic hypotension

## 2023-10-26 NOTE — ASSESSMENT & PLAN NOTE
Lab Results   Component Value Date    HGBA1C 6.0 (H) 10/25/2019       No results for input(s): "POCGLU" in the last 72 hours. Blood Sugar Average: Last 72 hrs:    Patient with ongoing nonhealing left great toe ulcer. Dressing which was changed in ER, discussed with ER physician seems ulcer in the distal base did not do probe testing. X-ray pending.   Possibility of osteomyelitis  Continue vanco/cefepime  Follow-up x-ray report may need MRI  Podiatry consult  Recheck A1c  Tight glycemic control

## 2023-10-26 NOTE — ASSESSMENT & PLAN NOTE
Dents with vertiginous symptoms  No other focal symptoms to suggest CVA, and no nystagmus, CTA of the head/neck negative  Probably multifactorial in the setting of sepsis, hyperglycemia, electrolyte derangements  We will correct these, if still symptomatic consider MRI to rule out occult CVA

## 2023-10-26 NOTE — ED NOTES
Patient declined covid swab for [employer] reasons. Provider notified.      Oh Watson RN  10/25/23 9445       Oh Watson RN  10/25/23 9911

## 2023-10-26 NOTE — ASSESSMENT & PLAN NOTE
Blood glucose very poorly controlled with hemoglobin A1c of 14.7%. Last A1c on record was 4 years ago.     We will increase Lantus to 22 units nightly  Add mealtime Humalog 3 units 3 times daily with meals  Continue ISS for correctional coverage  Hypoglycemia protocol  Consistent carb diet  Continue to monitor and titrate as necessary

## 2023-10-26 NOTE — ED NOTES
Pt's wound rewrapped at this time. Wound noted to be draining and malodorous. Wound wrapped with xeroform, gauze, and cling. Covered with sock.       Jamia Enrique RN  10/26/23 1221

## 2023-10-26 NOTE — CASE MANAGEMENT
Case Management Assessment & Discharge Planning Note    Patient name Sam Steiner  Location ED 20/ED 20 MRN [id number]  : 1955 Date 10/26/2023       Current Admission Date: 10/25/2023  Current Admission Diagnosis:Diabetic ulcer of left great toe [facility])   Patient Active Problem List    Diagnosis Date Noted    Diabetic ulcer of left great toe ([address]) 10/25/2023    Pseudohyponatremia 10/25/2023    Vertigo 10/25/2023    Abdominal pain 2016    Diabetes mellitus ([address])       LOS (days): 1  Geometric Mean LOS (GMLOS) (days):   Days to GMLOS:     OBJECTIVE:    Risk of Unplanned Readmission Score: 10.61         Current admission status: Inpatient       Preferred Pharmacy:   [address] [place] [id number]  Phone: [phone] Fax: [address] [address]  Phone: [phone] Fax: [phone]    Primary Care Provider: Milton Smith MD    Primary Insurance: [facility]  Secondary Insurance: VETERANS    ASSESSMENT:  [address] [place] [address] Representative - Spouse   Primary Phone: [phone] (Mobile)  Home Phone: [phone]                 Advance Directives  Does patient have a [address]?: No  Was patient offered paperwork?: Yes (not interested)  Does patient currently have a Health Care decision maker?: Yes, please see Health Care Proxy section  Does patient have Advance Directives?: No  Was patient offered paperwork?: Yes         Readmission Root Cause  30 Day Readmission: No    Patient Information  Admitted from[de-identified] Home  Mental Status: Alert  During Assessment patient was accompanied by: Not accompanied during assessment  Assessment information provided by[de-identified] Patient  Primary Caregiver: Self  Support Systems: Spouse/significant other  [place] of Residence: [address] [place] [address] do you live in?: [facility] entry access options.  Select all that apply.: Stairs  Number of steps to enter home.: One Flight  Do the steps have railings?: Yes  Type of Current Residence: 2 story home  Upon entering residence, is there a bedroom on the main floor (no further steps)?: Yes  Upon entering residence, is there a bathroom on the main floor (no further steps)?: Yes  In the last 12 months, was there a time when you were not able to pay the mortgage or rent on time?: No  In the last 12 months, how many places have you lived?: 1  In the last 12 months, was there a time when you did not have a steady place to sleep or slept in a shelter (including now)?: No  Homeless/housing insecurity resource given?: N/A  Is patient a ?: Yes  Is patient active with [facility])?: Yes  Is patient service connected?: Yes    Activities of Daily Living Prior to Admission  Functional Status: Independent  Completes ADLs independently?: Yes  Ambulates independently?: Yes  Does patient use assisted devices?: No  Does patient currently own DME?: No  Does patient have a history of Outpatient Therapy (PT/OT)?: No  Does the patient have a history of Short-Term Rehab?: No  Does patient have a history of HHC?: No  Does patient currently have [facility]?: No         Patient Information Continued  Income Source: Unemployed  Does patient have prescription coverage?: Yes  Within the past 12 months, you worried that your food would run out before you got the money to buy more.: Never true  Within the past 12 months, the food you bought just didn't last and you didn't have money to get more.: Never true  Food insecurity resource given?: N/A  Does patient receive dialysis treatments?: No  Does patient have a history of substance abuse?: No  Does patient have a history of Mental Health Diagnosis?: No         Means of Transportation  Means of Transport to Appts[de-identified] Drives Self  In the past 12 months, has lack of transportation kept you from medical appointments or from getting medications?: No  In the past 12 months, has lack of transportation kept you from meetings, work, or from getting things needed for daily living?: No  Was application for public transport provided?: N/A        DISCHARGE DETAILS:    Discharge planning discussed with[de-identified] Patient  Freedom of Choice: Yes  Comments - Freedom of Choice: No anticipated DC needs  CM contacted family/caregiver?: No- see comments                  [facility]          Is the patient interested in [facility] at discharge?: No    DME Referral Provided  Referral made for DME?: No    Other Referral/Resources/Interventions Provided:  Referral Comments: No anticipated DC needs         Treatment Team Recommendation: Home  Discharge Destination Plan[de-identified] Home  Transport at Discharge : Automobile, Family

## 2023-10-26 NOTE — PROGRESS NOTES
[address]  Progress Note  Name: Alfredo Wilcox  MRN: [id number]  Unit/Bed#: ED 20 I Date of Admission: 10/25/2023   Date of Service: 10/26/2023 I Hospital Day: 1    Assessment/Plan   * Diabetic ulcer of left great toe [facility])  Assessment & Plan  Nonhealing ulceration of the left great toe, exacerbated by poorly controlled diabetes as evidenced by hemoglobin A1c of 14.7% as an outpatient  X-ray of the left great toe concerning for osteomyelitis at the distal aspect of the toe. Continue IV vancomycin and cefepime  Follow-up podiatry recommendations  Suspect he will need surgical intervention    Diabetes mellitus ([address])  Assessment & Plan  Blood glucose very poorly controlled with hemoglobin A1c of 14.7%. Last A1c on record was 4 years ago. We will increase Lantus to 22 units nightly  Add mealtime Humalog 3 units 3 times daily with meals  Continue ISS for correctional coverage  Hypoglycemia protocol  Consistent carb diet  Continue to monitor and titrate as necessary        Vertigo  Assessment & Plan  Dents with vertiginous symptoms  No other focal symptoms to suggest CVA, and no nystagmus, CTA of the head/neck negative  Probably multifactorial in the setting of sepsis, hyperglycemia, electrolyte derangements  We will correct these, if still symptomatic consider MRI to rule out occult CVA    Pseudohyponatremia  Assessment & Plan  Patient still with pseudohyponatremia second to hyperglycemia  Continue to titrate insulin for adequate blood glucose control  Recheck BMP in the morning        VTE Pharmacologic Prophylaxis:   Moderate Risk (Score 3-4) - Pharmacological DVT Prophylaxis Ordered: heparin. Patient Centered Rounds: I performed bedside rounds with nursing staff today. Discussions with Specialists or Other Care Team Provider: NIDIA     Education and Discussions with Family / Patient: Patient declined call to .      Total Time Spent on Date of Encounter in care of patient: 35 mins. This time was spent on one or more of the following: performing physical exam; counseling and coordination of care; obtaining or reviewing history; documenting in the medical record; reviewing/ordering tests, medications or procedures; communicating with other healthcare professionals and discussing with patient's family/caregivers. Current Length of Stay: 1 day(s)  Current Patient Status: Inpatient   Certification Statement: The patient will continue to require additional inpatient hospital stay due to IV abx's, surgical interventions, medication titration  Discharge Plan: Anticipate discharge in >72 hrs to home. Code Status: Level 3 - DNAR and DNI    Subjective:   Patient seen and examined. Still having some intermittent dizziness and generalized weakness. No chest pain or shortness of breath. Has pain in his toe. Afebrile. Objective:     Vitals:   Temp (24hrs), Av.5 °F (36.9 °C), Min:98.1 °F (36.7 °C), Max:99.4 °F (37.4 °C)    Temp:  [98.1 °F (36.7 °C)-99.4 °F (37.4 °C)] 99.4 °F (37.4 °C)  HR:  [] 77  Resp:  [18-23] 22  BP: (123-141)/(64-78) 136/78  SpO2:  [94 %-99 %] 96 %  Body mass index is 27.26 kg/m². Input and Output Summary (last 24 hours): Intake/Output Summary (Last 24 hours) at 10/26/2023 1452  Last data filed at 10/26/2023 3122  Gross per 24 hour   Intake 1650 ml   Output --   Net 1650 ml       PHYSICAL EXAM:    Vitals signs reviewed  Constitutional   Awake and cooperative. NAD. Head/Neck   Normocephalic. Atraumatic. HEENT   No scleral icterus. EOMI. Heart   Regular rate and rhythm. No murmers. Lungs   Clear to auscultation bilaterally. Respirations unlaboured. Abdomen   Soft. Nontender. Nondistended. Skin   Skin color normal. No rashes. Extremities   No deformities. No peripheral edema. Left great toe bandaged, clean and dry. Neuro   Alert and oriented. No new deficits. Psych   Mood stable.  Affect normal.         Additional Data: Labs:  Results from last 7 days   Lab Units 10/26/23  0451   WBC Thousand/uL 11.81*   HEMOGLOBIN g/dL 12.2   HEMATOCRIT % 35.9*   PLATELETS Thousands/uL 271   NEUTROS PCT % 74   LYMPHS PCT % 13*   MONOS PCT % 11   EOS PCT % 1     Results from last 7 days   Lab Units 10/26/23  0451 10/25/23  2030   SODIUM mmol/L 129* 128*   POTASSIUM mmol/L 4.1 4.2   CHLORIDE mmol/L 101 95*   CO2 mmol/L 23 21   BUN mg/dL 22 25   CREATININE mg/dL 0.98 1.10   ANION GAP mmol/L 5 12   CALCIUM mg/dL 7.8* 8.4   ALBUMIN g/dL  --  3.2*   TOTAL BILIRUBIN mg/dL  --  0.63   ALK PHOS U/L  --  98   ALT U/L  --  11   AST U/L  --  13   GLUCOSE RANDOM mg/dL 364* 309*     Results from last 7 days   Lab Units 10/25/23  2030   INR  1.08     Results from last 7 days   Lab Units 10/26/23  1309 10/26/23  1053 10/26/23  0805 10/26/23  0050   POC GLUCOSE mg/dl 319* 375* 321* 402*     Results from last 7 days   Lab Units 10/26/23  0128   HEMOGLOBIN A1C % 14.7*     Results from last 7 days   Lab Units 10/25/23  2030   LACTIC ACID mmol/L 1.0   PROCALCITONIN ng/ml 1.11*       Lines/Drains:  Invasive Devices       Peripheral Intravenous Line  Duration             Peripheral IV 10/25/23 Distal;Right;Upper;Ventral (anterior) Arm <1 day                          Imaging: Reviewed radiology reports from this admission including: xray(s)    Recent Cultures (last 7 days):   Results from last 7 days   Lab Units 10/25/23  2033 10/25/23  2030   BLOOD CULTURE  Received in Microbiology Lab. Culture in Progress. Received in Microbiology Lab. Culture in Progress.        Last 24 Hours Medication List:   Current Facility-Administered Medications   Medication Dose Route Frequency Provider Last Rate    acetaminophen  650 mg Oral Q6H PRN Coco Macias MD      atorvastatin  10 mg Oral Daily Coco Macias MD      cefepime  2,000 mg Intravenous Q12H Coco Macias MD Stopped (10/26/23 0057)    DULoxetine  20 mg Oral BID Coco Macias MD      heparin (porcine) 5,000 Units Subcutaneous [facility] Gely Schwartz MD      insulin glargine  22 Units Subcutaneous HS Lorelei Bobo DO      insulin lispro  1-6 Units Subcutaneous TID AC Gely Schwartz MD      insulin lispro  3 Units Subcutaneous TID With Meals Lorelei Bobo DO      pregabalin  75 mg Oral BID Gely Schwartz MD      traMADol  50 mg Oral Q6H PRN Gely Schwartz MD      vancomycin  750 mg Intravenous Q12H Gely Schwartz  mg (10/26/23 8413)        Today, Patient Was Seen By: Jose J Rock DO    **Please Note: This note may have been constructed using a voice recognition system. **

## 2023-10-27 ENCOUNTER — APPOINTMENT (INPATIENT)
Dept: VASCULAR ULTRASOUND | Facility: HOSPITAL | Age: 68
DRG: 854 | End: 2023-10-27
Payer: COMMERCIAL

## 2023-10-27 LAB
ANION GAP SERPL CALCULATED.3IONS-SCNC: 5 MMOL/L
BUN SERPL-MCNC: 16 MG/DL (ref 5–25)
CALCIUM SERPL-MCNC: 7.5 MG/DL (ref 8.4–10.2)
CHLORIDE SERPL-SCNC: 103 MMOL/L (ref 96–108)
CO2 SERPL-SCNC: 24 MMOL/L (ref 21–32)
CREAT SERPL-MCNC: 0.79 MG/DL (ref 0.6–1.3)
ERYTHROCYTE [DISTWIDTH] IN BLOOD BY AUTOMATED COUNT: 12.4 % (ref 11.6–15.1)
GFR SERPL CREATININE-BSD FRML MDRD: 92 ML/MIN/1.73SQ M
GLUCOSE SERPL-MCNC: 238 MG/DL (ref 65–140)
GLUCOSE SERPL-MCNC: 266 MG/DL (ref 65–140)
GLUCOSE SERPL-MCNC: 279 MG/DL (ref 65–140)
GLUCOSE SERPL-MCNC: 293 MG/DL (ref 65–140)
GLUCOSE SERPL-MCNC: 298 MG/DL (ref 65–140)
HCT VFR BLD AUTO: 33.5 % (ref 36.5–49.3)
HGB BLD-MCNC: 11.7 G/DL (ref 12–17)
MAGNESIUM SERPL-MCNC: 2 MG/DL (ref 1.9–2.7)
MCH RBC QN AUTO: 29 PG (ref 26.8–34.3)
MCHC RBC AUTO-ENTMCNC: 34.9 G/DL (ref 31.4–37.4)
MCV RBC AUTO: 83 FL (ref 82–98)
PLATELET # BLD AUTO: 255 THOUSANDS/UL (ref 149–390)
PMV BLD AUTO: 9.6 FL (ref 8.9–12.7)
POTASSIUM SERPL-SCNC: 3.9 MMOL/L (ref 3.5–5.3)
RBC # BLD AUTO: 4.03 MILLION/UL (ref 3.88–5.62)
SODIUM SERPL-SCNC: 132 MMOL/L (ref 135–147)
VANCOMYCIN SERPL-MCNC: 8.8 UG/ML (ref 10–20)
WBC # BLD AUTO: 8.09 THOUSAND/UL (ref 4.31–10.16)

## 2023-10-27 PROCEDURE — 93923 UPR/LXTR ART STDY 3+ LVLS: CPT

## 2023-10-27 PROCEDURE — 99233 SBSQ HOSP IP/OBS HIGH 50: CPT | Performed by: INTERNAL MEDICINE

## 2023-10-27 PROCEDURE — 80202 ASSAY OF VANCOMYCIN: CPT | Performed by: INTERNAL MEDICINE

## 2023-10-27 PROCEDURE — 87147 CULTURE TYPE IMMUNOLOGIC: CPT | Performed by: PODIATRIST

## 2023-10-27 PROCEDURE — 87186 SC STD MICRODIL/AGAR DIL: CPT | Performed by: PODIATRIST

## 2023-10-27 PROCEDURE — 87205 SMEAR GRAM STAIN: CPT | Performed by: PODIATRIST

## 2023-10-27 PROCEDURE — 83735 ASSAY OF MAGNESIUM: CPT | Performed by: INTERNAL MEDICINE

## 2023-10-27 PROCEDURE — 80048 BASIC METABOLIC PNL TOTAL CA: CPT | Performed by: INTERNAL MEDICINE

## 2023-10-27 PROCEDURE — 85027 COMPLETE CBC AUTOMATED: CPT | Performed by: INTERNAL MEDICINE

## 2023-10-27 PROCEDURE — 87070 CULTURE OTHR SPECIMN AEROBIC: CPT | Performed by: PODIATRIST

## 2023-10-27 PROCEDURE — 82948 REAGENT STRIP/BLOOD GLUCOSE: CPT

## 2023-10-27 RX ORDER — INSULIN GLARGINE 100 [IU]/ML
26 INJECTION, SOLUTION SUBCUTANEOUS
Status: DISCONTINUED | OUTPATIENT
Start: 2023-10-27 | End: 2023-10-28

## 2023-10-27 RX ORDER — INSULIN LISPRO 100 [IU]/ML
4 INJECTION, SOLUTION INTRAVENOUS; SUBCUTANEOUS
Status: DISCONTINUED | OUTPATIENT
Start: 2023-10-27 | End: 2023-10-29

## 2023-10-27 RX ORDER — CEFAZOLIN SODIUM 2 G/50ML
2000 SOLUTION INTRAVENOUS EVERY 8 HOURS
Status: DISCONTINUED | OUTPATIENT
Start: 2023-10-27 | End: 2023-10-29

## 2023-10-27 RX ADMIN — INSULIN LISPRO 4 UNITS: 100 INJECTION, SOLUTION INTRAVENOUS; SUBCUTANEOUS at 11:38

## 2023-10-27 RX ADMIN — VANCOMYCIN HYDROCHLORIDE 1250 MG: 1 INJECTION, POWDER, LYOPHILIZED, FOR SOLUTION INTRAVENOUS at 12:30

## 2023-10-27 RX ADMIN — HEPARIN SODIUM 5000 UNITS: 5000 INJECTION INTRAVENOUS; SUBCUTANEOUS at 21:53

## 2023-10-27 RX ADMIN — INSULIN GLARGINE 26 UNITS: 100 INJECTION, SOLUTION SUBCUTANEOUS at 21:53

## 2023-10-27 RX ADMIN — INSULIN LISPRO 3 UNITS: 100 INJECTION, SOLUTION INTRAVENOUS; SUBCUTANEOUS at 11:39

## 2023-10-27 RX ADMIN — CEFAZOLIN SODIUM 2000 MG: 2 SOLUTION INTRAVENOUS at 16:59

## 2023-10-27 RX ADMIN — INSULIN LISPRO 4 UNITS: 100 INJECTION, SOLUTION INTRAVENOUS; SUBCUTANEOUS at 16:58

## 2023-10-27 RX ADMIN — CEFEPIME HYDROCHLORIDE 2000 MG: 2 INJECTION, SOLUTION INTRAVENOUS at 11:37

## 2023-10-27 RX ADMIN — INSULIN LISPRO 3 UNITS: 100 INJECTION, SOLUTION INTRAVENOUS; SUBCUTANEOUS at 08:07

## 2023-10-27 RX ADMIN — CEFAZOLIN SODIUM 2000 MG: 2 SOLUTION INTRAVENOUS at 21:53

## 2023-10-27 RX ADMIN — HEPARIN SODIUM 5000 UNITS: 5000 INJECTION INTRAVENOUS; SUBCUTANEOUS at 01:23

## 2023-10-27 RX ADMIN — HEPARIN SODIUM 5000 UNITS: 5000 INJECTION INTRAVENOUS; SUBCUTANEOUS at 06:29

## 2023-10-27 RX ADMIN — CEFEPIME HYDROCHLORIDE 2000 MG: 2 INJECTION, SOLUTION INTRAVENOUS at 00:20

## 2023-10-27 RX ADMIN — HEPARIN SODIUM 5000 UNITS: 5000 INJECTION INTRAVENOUS; SUBCUTANEOUS at 16:59

## 2023-10-27 NOTE — WOUND OSTOMY CARE
Progress Note - Wound   Kelly Denver 76 y.o. male MRN: [id number]  Unit/Bed#: -[facility] Encounter: [id number]      Assessment:   Patient admitted due to diabetic ulcer of left great toe. History of diabetes, prostate cancer. Wound care consulted for left toe wound- podiatry consult pending. Patient agreeable to assessment, alert and oriented x4, continent of bowel and bladder, is independent with ambulation. Primary RN made aware of assessment. Podiatry at bedside during assessment and will be following for expected toe amputation- podiatry applied new dressing. 1. Patient denies assessment of sacrum and buttock- states skin is dry, intact, no wounds, no rash, no pain. 2. Bilateral heels- skin is dry, intact, blanchable. 3. Left great toe- Wound is oval in shape, full-thickness, appears to be bone expose in wound bed, is approx. 10% pink tissue, and 90% yellow adhered slough, with moderate amount of brown drainage noted, with foul odor. Kerri-wound is macerated, fragile white skin, toe is swollen. -is receiving IV antibiotics. Wound care will sign off at this time, please re-consult if needed, podiatry is following for left toe wound. . Please follow skin care recommendations written as orders for skin protection and prevention. Skin care plans:  1-Hydraguard to bilateral sacrum, buttock and heels BID and PRN  2-Elevate heels to offload pressure. 3-Ehob cushion in chair when out of bed. 4-Moisturize skin daily with skin nourishing cream.  5-L toe- Wound care per podiatry orders. Wound 10/26/23 Diabetic Ulcer Toe (Comment  which one) Anterior; Left (Active)   Wound Image    10/27/23 1112   Wound Description Drainage;Brown;Pink;Yellow;Slough; Exposed bone 10/27/23 1112   Pressure Injury Stage     Kerri-wound Assessment Maceration;Fragile; Swelling 10/27/23 1112   Wound Length (cm) 3 cm 10/27/23 1112   Wound Width (cm) 2 cm 10/27/23 1112   Wound Depth (cm) 1.6 cm 10/27/23 1112   Wound Surface Area (cm^2) 6 cm^2 10/27/23 1112   Wound Volume (cm^3) 9.6 cm^3 10/27/23 1112   Calculated Wound Volume (cm^3) 9.6 cm^3 10/27/23 1112   Tunneling 0 cm 10/27/23 1112   Undermining 0 10/27/23 1112   Drainage Amount Moderate 10/27/23 1112   Drainage Description Brown;Serosanguineous 10/27/23 1112   Non-staged Wound Description Full thickness 10/27/23 1112   Treatments Cleansed;Irrigation with NSS;Site care 10/27/23 1112   Dressing Other (Comment) 10/27/23 1112   Wound packed? No 10/27/23 1112   Dressing Changed     Patient Tolerance     Dressing Status Clean;Dry; Intact 10/27/23 1112     Wound Care will sign off  Call or Tigertext with any questions    Maile WONGN RN CWON  Wound and Ostomy care

## 2023-10-27 NOTE — PLAN OF CARE
Problem: Potential for Falls  Goal: Patient will remain free of falls  Description: INTERVENTIONS:  - Educate patient/family on patient safety including physical limitations  - Instruct patient to call for assistance with activity   - Consult OT/PT to assist with strengthening/mobility   - Keep Call bell within reach  - Keep bed low and locked with side rails adjusted as appropriate  - Keep care items and personal belongings within reach  - Initiate and maintain comfort rounds  - Make Fall Risk Sign visible to staff  - Offer Toileting every 2 Hours, in advance of need  - Initiate/Maintain bedalarm  - Obtain necessary fall risk management equipment:   - Apply yellow socks and bracelet for high fall risk patients  - Consider moving patient to room near nurses station  Outcome: Progressing     Problem: MOBILITY - ADULT  Goal: Maintain or return to baseline ADL function  Description: INTERVENTIONS:  -  Assess patient's ability to carry out ADLs; assess patient's baseline for ADL function and identify physical deficits which impact ability to perform ADLs (bathing, care of mouth/teeth, toileting, grooming, dressing, etc.)  - Assess/evaluate cause of self-care deficits   - Assess range of motion  - Assess patient's mobility; develop plan if impaired  - Assess patient's need for assistive devices and provide as appropriate  - Encourage maximum independence but intervene and supervise when necessary  - Involve family in performance of ADLs  - Assess for home care needs following discharge   - Consider OT consult to assist with ADL evaluation and planning for discharge  - Provide patient education as appropriate  Outcome: Progressing  Goal: Maintains/Returns to pre admission functional level  Description: INTERVENTIONS:  - Perform BMAT or MOVE assessment daily.   - Set and communicate daily mobility goal to care team and patient/family/caregiver.    - Collaborate with rehabilitation services on mobility goals if consulted  - Perform Range of Motion 3 times a day. - Reposition patient every 2 hours.   - Dangle patient 3 times a day  - Stand patient 3 times a day  - Ambulate patient 3 times a day  - Out of bed to chair 3 times a day   - Out of bed for meals 3 times a day  - Out of bed for toileting  - Record patient progress and toleration of activity level   Outcome: Progressing     Problem: PAIN - ADULT  Goal: Verbalizes/displays adequate comfort level or baseline comfort level  Description: Interventions:  - Encourage patient to monitor pain and request assistance  - Assess pain using appropriate pain scale  - Administer analgesics based on type and severity of pain and evaluate response  - Implement non-pharmacological measures as appropriate and evaluate response  - Consider cultural and social influences on pain and pain management  - Notify physician/advanced practitioner if interventions unsuccessful or patient reports new pain  Outcome: Progressing     Problem: INFECTION - ADULT  Goal: Absence or prevention of progression during hospitalization  Description: INTERVENTIONS:  - Assess and monitor for signs and symptoms of infection  - Monitor lab/diagnostic results  - Monitor all insertion sites, i.e. indwelling lines, tubes, and drains  - Monitor endotracheal if appropriate and nasal secretions for changes in amount and color  - [place] appropriate cooling/warming therapies per order  - Administer medications as ordered  - Instruct and encourage patient and family to use good hand hygiene technique  - Identify and instruct in appropriate isolation precautions for identified infection/condition  Outcome: Progressing  Goal: Absence of fever/infection during neutropenic period  Description: INTERVENTIONS:  - Monitor WBC    Outcome: Progressing     Problem: SAFETY ADULT  Goal: Patient will remain free of falls  Description: INTERVENTIONS:  - Educate patient/family on patient safety including physical limitations  - Instruct patient to call for assistance with activity   - Consult OT/PT to assist with strengthening/mobility   - Keep Call bell within reach  - Keep bed low and locked with side rails adjusted as appropriate  - Keep care items and personal belongings within reach  - Initiate and maintain comfort rounds  - Make Fall Risk Sign visible to staff  - Offer Toileting every 2 Hours, in advance of need  - Initiate/Maintain bedalarm  - Obtain necessary fall risk management equipment:   - Apply yellow socks and bracelet for high fall risk patients  - Consider moving patient to room near nurses station  Outcome: Progressing  Goal: Maintain or return to baseline ADL function  Description: INTERVENTIONS:  -  Assess patient's ability to carry out ADLs; assess patient's baseline for ADL function and identify physical deficits which impact ability to perform ADLs (bathing, care of mouth/teeth, toileting, grooming, dressing, etc.)  - Assess/evaluate cause of self-care deficits   - Assess range of motion  - Assess patient's mobility; develop plan if impaired  - Assess patient's need for assistive devices and provide as appropriate  - Encourage maximum independence but intervene and supervise when necessary  - Involve family in performance of ADLs  - Assess for home care needs following discharge   - Consider OT consult to assist with ADL evaluation and planning for discharge  - Provide patient education as appropriate  Outcome: Progressing  Goal: Maintains/Returns to pre admission functional level  Description: INTERVENTIONS:  - Perform BMAT or MOVE assessment daily.   - Set and communicate daily mobility goal to care team and patient/family/caregiver. - Collaborate with rehabilitation services on mobility goals if consulted  - Perform Range of Motion 3 times a day. - Reposition patient every 2 hours.   - Dangle patient 3 times a day  - Stand patient 3 times a day  - Ambulate patient 3 times a day  - Out of bed to chair 3 times a day   - Out of bed for meals 3 times a day  - Out of bed for toileting  - Record patient progress and toleration of activity level   Outcome: Progressing     Problem: DISCHARGE PLANNING  Goal: Discharge to home or other facility with appropriate resources  Description: INTERVENTIONS:  - Identify barriers to discharge w/patient and caregiver  - Arrange for needed discharge resources and transportation as appropriate  - Identify discharge learning needs (meds, wound care, etc.)  - Arrange for interpretive services to assist at discharge as needed  - Refer to Case Management Department for coordinating discharge planning if the patient needs post-hospital services based on physician/advanced practitioner order or complex needs related to functional status, cognitive ability, or social support system  Outcome: Progressing     Problem: Knowledge Deficit  Goal: Patient/family/caregiver demonstrates understanding of disease process, treatment plan, medications, and discharge instructions  Description: Complete learning assessment and assess knowledge base.   Interventions:  - Provide teaching at level of understanding  - Provide teaching via preferred learning methods  Outcome: Progressing     Problem: SKIN/TISSUE INTEGRITY - ADULT  Goal: Skin Integrity remains intact(Skin Breakdown Prevention)  Description: Assess:  -Perform Kurt assessment every 2  -Clean and moisturize skin every 2  -Inspect skin when repositioning, toileting, and assisting with ADLS  -Assess under medical devices such as wedges every   -Assess extremities for adequate circulation and sensation     Bed Management:  -Have minimal linens on bed & keep smooth, unwrinkled  -Change linens as needed when moist or perspiring  -Avoid sitting or lying in one position for more than 2 hours while in bed  -Keep HOB at 30degrees     Toileting:  -Offer bedside commode  -Assess for incontinence every 2  -Use incontinent care products after each incontinent episode such as calazime    Activity:  -Mobilize patient 3 times a day  -Encourage activity and walks on unit  -Encourage or provide ROM exercises   -Turn and reposition patient every 2 Hours  -Use appropriate equipment to lift or move patient in bed  -Instruct/ Assist with weight shifting every 2 when out of bed in chair  -Consider limitation of chair time 2 hour intervals    Skin Care:  -Avoid use of baby powder, tape, friction and shearing, hot water or constrictive clothing  -Relieve pressure over bony prominences using wedges  -Do not massage red bony areas    Next Steps:  -Teach patient strategies to minimize risks such as 2   -Consider consults to  interdisciplinary teams such as wound  Outcome: Progressing  Goal: Incision(s), wounds(s) or drain site(s) healing without S/S of infection  Description: INTERVENTIONS  - Assess and document dressing, incision, wound bed, drain sites and surrounding tissue  - Provide patient and family education  - Perform skin care/dressing changes every   Outcome: Progressing  Goal: Pressure injury heals and does not worsen  Description: Interventions:  - Implement low air loss mattress or specialty surface (Criteria met)  - Apply silicone foam dressing  - Instruct/assist with weight shifting every 20 minutes when in chair   - Limit chair time to 2 hour intervals  - Use special pressure reducing interventions such as wedges when in chair   - Apply fecal or urinary incontinence containment device   - Perform passive or active ROM every 2  - Turn and reposition patient & offload bony prominences every 2 hours   - Utilize friction reducing device or surface for transfers   - Consider consults to  interdisciplinary teams such as wound  - Use incontinent care products after each incontinent episode such as calazime  - Consider nutrition services referral as needed  Outcome: Progressing

## 2023-10-27 NOTE — ASSESSMENT & PLAN NOTE
Postop day 0 status post left hallux amputation at metatarsophalangeal joint and left hallux abscess incision and drainage  Nonhealing ulceration of the left great toe, exacerbated by poorly controlled diabetes as evidenced by hemoglobin A1c of 14.7% as an outpatient  X-ray of the left great toe concerning for osteomyelitis at the distal aspect of the toe. Reviewed MRI: Osteomyelitis identified in the distal proximal and distal phalanxes  Wound cultures grew MRSA; based on JOSE patient has been placed on daptomycin. Would plan to continue antibiotics 3 days post amputation, assuming surgical care obtained.    Bilateral ABIs WNL

## 2023-10-27 NOTE — ASSESSMENT & PLAN NOTE
Blood glucose very poorly controlled with hemoglobin A1c of 14.7%. Last A1c on record was 4 years ago.    today in AM    cont Lantus to 35 units nightly  cont mealtime Humalog to 5 units 3 times daily  Cont ISS for correctional coverage  Hypoglycemia protocol  Consistent carb diet  Continue to monitor and titrate as necessary

## 2023-10-27 NOTE — ASSESSMENT & PLAN NOTE
Dents with vertiginous symptoms  No other focal symptoms to suggest CVA, and no nystagmus, CTA of the head/neck negative  Symptoms were multifactorial in the setting of sepsis, significant hyperglycemia, and electrolyte derangements  His dizziness has now resolved with treatment as above

## 2023-10-27 NOTE — PROGRESS NOTES
Stephanie Joyce is a 76 y.o. male who is currently ordered Vancomycin IV with management by the Pharmacy Consult service. Relevant clinical data and objective / subjective history reviewed. Vancomycin Assessment:  Indication and Goal AUC/Trough: Bone/joint infection (goal -600, trough >10)  Clinical Status: stable  Micro:     Renal Function:  SCr: 0.79 mg/dL  CrCl: 91.6 mL/min  Renal replacement: Not on dialysis  Days of Therapy: 2  Current Dose: 750 mg IV q12h, 10/27 level 8.8  Vancomycin Plan:  New Dosinmg IV q12h  Estimated AUC: 464 mcg*hr/mL  Estimated Trough: 14.8 mcg/mL  Next Level: 11/3/23  Renal Function Monitoring: Daily BMP and [place] will continue to follow closely for s/sx of nephrotoxicity, infusion reactions and appropriateness of therapy. BMP and CBC will be ordered per protocol. We will continue to follow the patient’s culture results and clinical progress daily.     Theo Almaraz, Pharmacist

## 2023-10-27 NOTE — PROGRESS NOTES
Vancomycin IV Pharmacy-to-Dose Consultation     Vancomycin has been discontinued. Pharmacy will sign off. Please contact or re-consult with questions.     Albaro Harris, Pharmacist

## 2023-10-27 NOTE — PLAN OF CARE
Problem: PAIN - ADULT  Goal: Verbalizes/displays adequate comfort level or baseline comfort level  Description: Interventions:  - Encourage patient to monitor pain and request assistance  - Assess pain using appropriate pain scale  - Administer analgesics based on type and severity of pain and evaluate response  - Implement non-pharmacological measures as appropriate and evaluate response  - Consider cultural and social influences on pain and pain management  - Notify physician/advanced practitioner if interventions unsuccessful or patient reports new pain  Outcome: Progressing     Problem: Potential for Falls  Goal: Patient will remain free of falls  Description: INTERVENTIONS:  - Educate patient/family on patient safety including physical limitations  - Instruct patient to call for assistance with activity   - Consult OT/PT to assist with strengthening/mobility   - Keep Call bell within reach  - Keep bed low and locked with side rails adjusted as appropriate  - Keep care items and personal belongings within reach  - Initiate and maintain comfort rounds  - Make Fall Risk Sign visible to staff  Problem: INFECTION - ADULT  Goal: Absence or prevention of progression during hospitalization  Description: INTERVENTIONS:  - Assess and monitor for signs and symptoms of infection  - Monitor lab/diagnostic results  - Monitor all insertion sites, i.e. indwelling lines, tubes, and drains  - Monitor endotracheal if appropriate and nasal secretions for changes in amount and color  - [place] appropriate cooling/warming therapies per order  - Administer medications as ordered  - Instruct and encourage patient and family to use good hand hygiene technique  - Identify and instruct in appropriate isolation precautions for identified infection/condition  Outcome: Progressing  Goal: Absence of fever/infection during neutropenic period  Description: INTERVENTIONS:  - Monitor WBC    Outcome: Progressing     - Apply yellow socks and bracelet for high fall risk patients  - Consider moving patient to room near nurses station  Outcome: Progressing

## 2023-10-27 NOTE — CONSULTS
Consultation - Podiatric Surgery    Kelly Tubbs 76 y.o. male MRN: [id number]  Unit/Bed#: -01 Encounter: [id number]      Assessment/Plan     Assessment:  Left hallux osteomyelitis     Plan:  -The Xray was reviewed - osteomyelitis of the distal phalanx of the first digit. -MRI ordered to assess extent of infection.   -Plan for left hallux amputation pending MRI on Tuesday. I spoke with the patient in detail about this and went over the risks and benefits. He is amendable to the surgery.   -Dressing to be changed daily with betadine, gauze and kerlex. -SHITAL/PVRs ordered, If abnormal I recommend vascular consult. History of Present Illness   Physician Requesting Consult: Ella Bobo, *  Reason for Consult / Principal Problem: Left hallux osteomyelitis   HPI: Kelly Tubbs is a 76y.o. year old male who presents with left hallux osteomyelitis. He reports he has had this wound for a year. He was seeing a [place] doctor who was doing debridements on it. Patient reports he felt he could do the treatment on his own at home with an [employer]ok[employer]ife. He reports he is unsure when the infection started but was having fever and chills this week. Consults    Review of Systems    Historical Information   Past Medical History:   Diagnosis Date    Diabetes mellitus ([facility] [address] [facility])     Prostate cancer ([address] [facility])      Past Surgical History:   Procedure Laterality Date    PROSTATE SURGERY       Social History   Social History     Substance and Sexual Activity   Alcohol Use No     Social History     Substance and Sexual Activity   Drug Use No     E-Cigarette/Vaping     E-Cigarette/Vaping Substances     Social History     Tobacco Use   Smoking Status Former   Smokeless Tobacco Never     Family History: History reviewed. No pertinent family history.     Meds/Allergies   all current active meds have been reviewed  Allergies   Allergen Reactions    Codeine GI Intolerance    Lisinopril        Objective   Vitals: Blood pressure 130/70, pulse 73, temperature 99.6 °F (37.6 °C), temperature source Oral, resp. rate 18, height 5' 10" (1.778 m), weight 86.2 kg (190 lb), SpO2 93 %. ,Body mass index is 27.26 kg/m². Intake/Output Summary (Last 24 hours) at 10/27/2023 1138  Last data filed at 10/27/2023 0900  Gross per 24 hour   Intake 480 ml   Output --   Net 480 ml     I/O last 24 hours: In: 480 [P.O.:480]  Out: -     Invasive Devices       Peripheral Intravenous Line  Duration             Peripheral IV 10/25/23 Distal;Right;Upper;Ventral (anterior) Arm 1 day                    Physical Exam  Ortho Exam    Vascular:   -DP pulse is palpable B/L, PT pulse is non-palpable B/L   -Capillary refill time is less than 3 seconds B/L  -Pedal hair growth is diminished B/L  -Temperature is warm to warm  from ankle to toes B/L   -There is edema noted to the LLE compared to the RLE. Neuro:  -Light touch and protective sensation is absent      Derm:  The left plantar hallux with a large wound with palpable proximal and distal phalanx bones. There is malodor noted. There is hyperkeratotic tissue to the periwound. Erythema to the periwound.        Lab Results:   Results from last 7 days   Lab Units 10/27/23  0620 10/26/23  0451   WBC Thousand/uL 8.09 11.81*   HEMOGLOBIN g/dL 11.7* 12.2   HEMATOCRIT % 33.5* 35.9*   PLATELETS Thousands/uL 255 271   NEUTROS PCT %  --  74   LYMPHS PCT %  --  13*   MONOS PCT %  --  11   EOS PCT %  --  1       Jose Moreland DPM

## 2023-10-27 NOTE — PHYSICAL THERAPY NOTE
Physical Therapy Cancellation Note    PT order received. Chart review performed. At this time, PT evaluation cancelled secondary to patient pending podiatry consult; x-ray of left foot concerning for osteomyelitis of the distal phalanx of the first digit. PT will follow and evaluate as appropriate. 10/27/23 1027   PT Last Visit   PT Visit Date 10/27/23   Note Type   Note type Evaluation; Cancelled Session   Cancel Reasons Medical status       Porfirio Blcakwell, PT, DPT

## 2023-10-27 NOTE — PROGRESS NOTES
[address]  Progress Note  Name: Nicholas Ash  MRN: [id number]  Unit/Bed#: -01 I Date of Admission: 10/25/2023   Date of Service: 10/27/2023 I Hospital Day: 2    Assessment/Plan   * Diabetic ulcer of left great toe [facility])  Assessment & Plan  Nonhealing ulceration of the left great toe, exacerbated by poorly controlled diabetes as evidenced by hemoglobin A1c of 14.7% as an outpatient  X-ray of the left great toe concerning for osteomyelitis at the distal aspect of the toe. Discontinue vancomycin and cefepime; no signs of systemic illness. We will place on IV cefazolin until surgery. Follow up [facility]; may need vascular consult   MRI ordered by podiatry to assess extent of the infection. Tentative plans for left hallux amputation on Tuesday        Diabetes mellitus ([address])  Assessment & Plan  Blood glucose very poorly controlled with hemoglobin A1c of 14.7%. Last A1c on record was 4 years ago. Now better controlled though blood glucose still in the mid 200s    We will increase Lantus to 26 units nightly  Increase mealtime Humalog to 4 units 3 times daily with meals  Continue ISS for correctional coverage  Hypoglycemia protocol  Consistent carb diet  Continue to monitor and titrate as necessary        Vertigo  Assessment & Plan  Dents with vertiginous symptoms  No other focal symptoms to suggest CVA, and no nystagmus, CTA of the head/neck negative  Symptoms were multifactorial in the setting of sepsis, significant hyperglycemia, and electrolyte derangements  His dizziness has now nearly resolved with treatment as above        VTE Pharmacologic Prophylaxis:   Moderate Risk (Score 3-4) - Pharmacological DVT Prophylaxis Ordered: heparin. Patient Centered Rounds: I performed bedside rounds with nursing staff today. Discussions with Specialists or Other Care Team Provider: NIDIA    Education and Discussions with Family / Patient: Updated  (wife) via phone.     Total Time Spent on Date of Encounter in care of patient: 35 mins. This time was spent on one or more of the following: performing physical exam; counseling and coordination of care; obtaining or reviewing history; documenting in the medical record; reviewing/ordering tests, medications or procedures; communicating with other healthcare professionals and discussing with patient's family/caregivers. Current Length of Stay: 2 day(s)  Current Patient Status: Inpatient   Certification Statement: The patient will continue to require additional inpatient hospital stay due to IV abx's, surgical interventions, medication titration  Discharge Plan: Anticipate discharge in >72 hrs to home. Code Status: Level 3 - DNAR and DNI    Subjective:   Patient seen and examined. Dizziness much improved today, in fact nearly resolved. Blood glucose also better controlled. Pain in the toe is tolerable. He was told he will need surgery today by podiatry. Otherwise no other new complaints. Objective:     Vitals:   Temp (24hrs), Av.8 °F (37.1 °C), Min:98.1 °F (36.7 °C), Max:99.6 °F (37.6 °C)    Temp:  [98.1 °F (36.7 °C)-99.6 °F (37.6 °C)] 99.6 °F (37.6 °C)  HR:  [73-80] 73  Resp:  [16-18] 18  BP: (125-134)/(67-80) 130/70  SpO2:  [93 %-97 %] 93 %  Body mass index is 26.54 kg/m². Input and Output Summary (last 24 hours): Intake/Output Summary (Last 24 hours) at 10/27/2023 1407  Last data filed at 10/27/2023 1137  Gross per 24 hour   Intake 730 ml   Output --   Net 730 ml       PHYSICAL EXAM:    Vitals signs reviewed  Constitutional   Awake and cooperative. NAD. Head/Neck   Normocephalic. Atraumatic. HEENT   No scleral icterus. EOMI. Heart   Regular rate and rhythm. No murmers. Lungs   Clear to auscultation bilaterally. Respirations unlaboured. Abdomen   Soft. Nontender. Nondistended. Skin   Skin color normal. No rashes. Extremities   No deformities. No peripheral edema. Left great toe bandaged, clean and dry. Neuro   Alert and oriented. No new deficits. Psych   Mood stable. Affect normal.         Additional Data:     Labs:  Results from last 7 days   Lab Units 10/27/23  0620 10/26/23  0451   WBC Thousand/uL 8.09 11.81*   HEMOGLOBIN g/dL 11.7* 12.2   HEMATOCRIT % 33.5* 35.9*   PLATELETS Thousands/uL 255 271   NEUTROS PCT %  --  74   LYMPHS PCT %  --  13*   MONOS PCT %  --  11   EOS PCT %  --  1     Results from last 7 days   Lab Units 10/27/23  0620 10/26/23  0451 10/25/23  2030   SODIUM mmol/L 132*   < > 128*   POTASSIUM mmol/L 3.9   < > 4.2   CHLORIDE mmol/L 103   < > 95*   CO2 mmol/L 24   < > 21   BUN mg/dL 16   < > 25   CREATININE mg/dL 0.79   < > 1.10   ANION GAP mmol/L 5   < > 12   CALCIUM mg/dL 7.5*   < > 8.4   ALBUMIN g/dL  --   --  3.2*   TOTAL BILIRUBIN mg/dL  --   --  0.63   ALK PHOS U/L  --   --  98   ALT U/L  --   --  11   AST U/L  --   --  13   GLUCOSE RANDOM mg/dL 279*   < > 309*    < > = values in this interval not displayed. Results from last 7 days   Lab Units 10/25/23  2030   INR  1.08     Results from last 7 days   Lab Units 10/27/23  1042 10/27/23  0731 10/26/23  2101 10/26/23  1534 10/26/23  1309 10/26/23  1053 10/26/23  0805 10/26/23  0050   POC GLUCOSE mg/dl 293* 238* 274* 424* 319* 375* 321* 402*     Results from last 7 days   Lab Units 10/26/23  0128   HEMOGLOBIN A1C % 14.7*     Results from last 7 days   Lab Units 10/25/23  2030   LACTIC ACID mmol/L 1.0   PROCALCITONIN ng/ml 1.11*       Lines/Drains:  Invasive Devices       Peripheral Intravenous Line  Duration             Peripheral IV 10/25/23 Distal;Right;Upper;Ventral (anterior) Arm 1 day                          Imaging: Reviewed radiology reports from this admission including: xray(s)    Recent Cultures (last 7 days):   Results from last 7 days   Lab Units 10/25/23  2033 10/25/23  2030   BLOOD CULTURE  No Growth at 24 hrs. No Growth at 24 hrs.        Last 24 Hours Medication List:   Current Facility-Administered Medications Medication Dose Route Frequency Provider Last Rate    acetaminophen  650 mg Oral Q6H PRN Nancy Chong MD      cefazolin  2,000 mg Intravenous Q8H Chalmers Fabry [facility], DO      heparin (porcine)  5,000 Units Subcutaneous [facility] Nancy Chong MD      insulin glargine  26 Units Subcutaneous HS Chalmers Fabry [facility], DO      insulin lispro  1-6 Units Subcutaneous TID AC Nancy Chong MD      insulin lispro  4 Units Subcutaneous TID With Meals Chalmers Fabry [facility], DO      traMADol  50 mg Oral Q6H PRN Nancy Chong MD          Today, Patient Was Seen By: Chalmers Fabry [facility], DO    **Please Note: This note may have been constructed using a voice recognition system. **

## 2023-10-28 ENCOUNTER — APPOINTMENT (INPATIENT)
Dept: MRI IMAGING | Facility: HOSPITAL | Age: 68
DRG: 854 | End: 2023-10-28
Payer: COMMERCIAL

## 2023-10-28 LAB
ANION GAP SERPL CALCULATED.3IONS-SCNC: 5 MMOL/L
BUN SERPL-MCNC: 12 MG/DL (ref 5–25)
CALCIUM SERPL-MCNC: 7.7 MG/DL (ref 8.4–10.2)
CHLORIDE SERPL-SCNC: 103 MMOL/L (ref 96–108)
CO2 SERPL-SCNC: 26 MMOL/L (ref 21–32)
CREAT SERPL-MCNC: 0.77 MG/DL (ref 0.6–1.3)
ERYTHROCYTE [DISTWIDTH] IN BLOOD BY AUTOMATED COUNT: 12.5 % (ref 11.6–15.1)
GFR SERPL CREATININE-BSD FRML MDRD: 93 ML/MIN/1.73SQ M
GLUCOSE SERPL-MCNC: 177 MG/DL (ref 65–140)
GLUCOSE SERPL-MCNC: 188 MG/DL (ref 65–140)
GLUCOSE SERPL-MCNC: 205 MG/DL (ref 65–140)
GLUCOSE SERPL-MCNC: 255 MG/DL (ref 65–140)
GLUCOSE SERPL-MCNC: 298 MG/DL (ref 65–140)
HCT VFR BLD AUTO: 35.8 % (ref 36.5–49.3)
HGB BLD-MCNC: 12.3 G/DL (ref 12–17)
MCH RBC QN AUTO: 28.6 PG (ref 26.8–34.3)
MCHC RBC AUTO-ENTMCNC: 34.4 G/DL (ref 31.4–37.4)
MCV RBC AUTO: 83 FL (ref 82–98)
PLATELET # BLD AUTO: 267 THOUSANDS/UL (ref 149–390)
PMV BLD AUTO: 9.7 FL (ref 8.9–12.7)
POTASSIUM SERPL-SCNC: 3.8 MMOL/L (ref 3.5–5.3)
RBC # BLD AUTO: 4.3 MILLION/UL (ref 3.88–5.62)
SODIUM SERPL-SCNC: 134 MMOL/L (ref 135–147)
WBC # BLD AUTO: 6.7 THOUSAND/UL (ref 4.31–10.16)

## 2023-10-28 PROCEDURE — A9585 GADOBUTROL INJECTION: HCPCS | Performed by: PODIATRIST

## 2023-10-28 PROCEDURE — 73720 MRI LWR EXTREMITY W/O&W/DYE: CPT

## 2023-10-28 PROCEDURE — 99233 SBSQ HOSP IP/OBS HIGH 50: CPT | Performed by: INTERNAL MEDICINE

## 2023-10-28 PROCEDURE — 82948 REAGENT STRIP/BLOOD GLUCOSE: CPT

## 2023-10-28 PROCEDURE — 80048 BASIC METABOLIC PNL TOTAL CA: CPT | Performed by: INTERNAL MEDICINE

## 2023-10-28 PROCEDURE — 85027 COMPLETE CBC AUTOMATED: CPT | Performed by: INTERNAL MEDICINE

## 2023-10-28 RX ORDER — INSULIN GLARGINE 100 [IU]/ML
30 INJECTION, SOLUTION SUBCUTANEOUS
Status: DISCONTINUED | OUTPATIENT
Start: 2023-10-28 | End: 2023-10-29

## 2023-10-28 RX ORDER — GADOBUTROL 604.72 MG/ML
8 INJECTION INTRAVENOUS
Status: COMPLETED | OUTPATIENT
Start: 2023-10-28 | End: 2023-10-28

## 2023-10-28 RX ADMIN — INSULIN LISPRO 4 UNITS: 100 INJECTION, SOLUTION INTRAVENOUS; SUBCUTANEOUS at 16:17

## 2023-10-28 RX ADMIN — INSULIN LISPRO 4 UNITS: 100 INJECTION, SOLUTION INTRAVENOUS; SUBCUTANEOUS at 12:41

## 2023-10-28 RX ADMIN — CEFAZOLIN SODIUM 2000 MG: 2 SOLUTION INTRAVENOUS at 15:30

## 2023-10-28 RX ADMIN — HEPARIN SODIUM 5000 UNITS: 5000 INJECTION INTRAVENOUS; SUBCUTANEOUS at 05:33

## 2023-10-28 RX ADMIN — INSULIN GLARGINE 30 UNITS: 100 INJECTION, SOLUTION SUBCUTANEOUS at 22:01

## 2023-10-28 RX ADMIN — INSULIN LISPRO 1 UNITS: 100 INJECTION, SOLUTION INTRAVENOUS; SUBCUTANEOUS at 08:20

## 2023-10-28 RX ADMIN — INSULIN LISPRO 2 UNITS: 100 INJECTION, SOLUTION INTRAVENOUS; SUBCUTANEOUS at 12:40

## 2023-10-28 RX ADMIN — INSULIN LISPRO 4 UNITS: 100 INJECTION, SOLUTION INTRAVENOUS; SUBCUTANEOUS at 08:20

## 2023-10-28 RX ADMIN — CEFAZOLIN SODIUM 2000 MG: 2 SOLUTION INTRAVENOUS at 22:02

## 2023-10-28 RX ADMIN — INSULIN LISPRO 3 UNITS: 100 INJECTION, SOLUTION INTRAVENOUS; SUBCUTANEOUS at 16:17

## 2023-10-28 RX ADMIN — GADOBUTROL 8 ML: 604.72 INJECTION INTRAVENOUS at 11:51

## 2023-10-28 RX ADMIN — HEPARIN SODIUM 5000 UNITS: 5000 INJECTION INTRAVENOUS; SUBCUTANEOUS at 15:30

## 2023-10-28 RX ADMIN — CEFAZOLIN SODIUM 2000 MG: 2 SOLUTION INTRAVENOUS at 05:33

## 2023-10-28 RX ADMIN — HEPARIN SODIUM 5000 UNITS: 5000 INJECTION INTRAVENOUS; SUBCUTANEOUS at 22:01

## 2023-10-28 NOTE — PLAN OF CARE
Problem: INFECTION - ADULT  Goal: Absence or prevention of progression during hospitalization  Description: INTERVENTIONS:  - Assess and monitor for signs and symptoms of infection  - Monitor lab/diagnostic results  - Monitor all insertion sites, i.e. indwelling lines, tubes, and drains  - Monitor endotracheal if appropriate and nasal secretions for changes in amount and color  - [place] appropriate cooling/warming therapies per order  - Administer medications as ordered  - Instruct and encourage patient and family to use good hand hygiene technique  - Identify and instruct in appropriate isolation precautions for identified infection/condition  Outcome: Progressing  Goal: Absence of fever/infection during neutropenic period  Description: INTERVENTIONS:  - Monitor WBC    Outcome: Progressing     Problem: SKIN/TISSUE INTEGRITY - ADULT  Goal: Pressure injury heals and does not worsen  Description: Interventions:  - Implement low air loss mattress or specialty surface (Criteria met)  - Apply silicone foam dressing  - Instruct/assist with weight shifting   - Limit chair time  - Use special pressure reducing interventions  - Perform passive or active ROM   - Turn and reposition patient & offload bony prominences  - Utilize friction reducing device or surface for transfers   - Consider consults to  interdisciplinary teams  - Use incontinent care products after each incontinent episode   - Consider nutrition services referral as needed  Outcome: Progressing     Problem: Nutrition/Hydration-ADULT  Goal: Nutrient/Hydration intake appropriate for improving, restoring or maintaining nutritional needs  Description: Monitor and assess patient's nutrition/hydration status for malnutrition. Collaborate with interdisciplinary team and initiate plan and interventions as ordered. Monitor patient's weight and dietary intake as ordered or per policy. Utilize nutrition screening tool and intervene as necessary.  Determine patient's food preferences and provide high-protein, high-caloric foods as appropriate.      INTERVENTIONS:  - Monitor oral intake, urinary output, labs, and treatment plans  - Assess nutrition and hydration status and recommend course of action  - Evaluate amount of meals eaten  - Assist patient with eating if necessary   - Allow adequate time for meals  - Recommend/ encourage appropriate diets, oral nutritional supplements, and vitamin/mineral supplements  - Order, calculate, and assess calorie counts as needed  - Recommend, monitor, and adjust tube feedings and TPN/PPN based on assessed needs  - Assess need for intravenous fluids  - Provide specific nutrition/hydration education as appropriate  - Include patient/family/caregiver in decisions related to nutrition  Outcome: Progressing

## 2023-10-28 NOTE — PROGRESS NOTES
[address]  Progress Note  Name: Erin Nguyen  MRN: [id number]  Unit/Bed#: -01 I Date of Admission: 10/25/2023   Date of Service: 10/28/2023 I Hospital Day: 3    Assessment/Plan   * Diabetic ulcer of left great toe [facility])  Assessment & Plan  Nonhealing ulceration of the left great toe, exacerbated by poorly controlled diabetes as evidenced by hemoglobin A1c of 14.7% as an outpatient  X-ray of the left great toe concerning for osteomyelitis at the distal aspect of the toe. Discontinue vancomycin and cefepime; no signs of systemic illness. We will place on IV cefazolin until surgery. Bilateral ABIs WNL  MRI to assess extent of infection completed; follow-up final read  Left hallux amputation planned for Tuesday 10/31        Diabetes mellitus ([address])  Assessment & Plan  Blood glucose very poorly controlled with hemoglobin A1c of 14.7%. Last A1c on record was 4 years ago. Blood glucose much better controlled now in the high 100s and low 200s    increase Lantus to 30 units nightly  Continue mealtime Humalog to 4 units 3 times daily with meals  ISS for correctional coverage  Hypoglycemia protocol  Consistent carb diet  Continue to monitor and titrate as necessary        Vertigo  Assessment & Plan  Dents with vertiginous symptoms  No other focal symptoms to suggest CVA, and no nystagmus, CTA of the head/neck negative  Symptoms were multifactorial in the setting of sepsis, significant hyperglycemia, and electrolyte derangements  His dizziness has now resolved with treatment as above    Pseudohyponatremia  Assessment & Plan  Improving with better BG control  monitor daily BMP        VTE Pharmacologic Prophylaxis:   Moderate Risk (Score 3-4) - Pharmacological DVT Prophylaxis Ordered: heparin. Patient Centered Rounds: I performed bedside rounds with nursing staff today.   Discussions with Specialists or Other Care Team Provider: NIDIA    Education and Discussions with Family / Patient: Updated  (wife) at bedside. Total Time Spent on Date of Encounter in care of patient: 40 mins. This time was spent on one or more of the following: performing physical exam; counseling and coordination of care; obtaining or reviewing history; documenting in the medical record; reviewing/ordering tests, medications or procedures; communicating with other healthcare professionals and discussing with patient's family/caregivers. Current Length of Stay: 3 day(s)  Current Patient Status: Inpatient   Certification Statement: The patient will continue to require additional inpatient hospital stay due to IV abx's, surgical interventions, medication titration  Discharge Plan: Anticipate discharge in >72 hrs to home. Code Status: Level 3 - DNAR and DNI    Subjective:   Patient seen and examined. No further complaints of dizziness. Still having occasional sweats. Otherwise feeling well. No new complaints. Pain controlled. Objective:     Vitals:   Temp (24hrs), Av.4 °F (36.9 °C), Min:98.3 °F (36.8 °C), Max:98.5 °F (36.9 °C)    Temp:  [98.3 °F (36.8 °C)-98.5 °F (36.9 °C)] 98.3 °F (36.8 °C)  BP: (132-152)/(70-84) 152/84  Body mass index is 26.51 kg/m². Input and Output Summary (last 24 hours): Intake/Output Summary (Last 24 hours) at 10/28/2023 1416  Last data filed at 10/28/2023 0230  Gross per 24 hour   Intake 130 ml   Output 150 ml   Net -20 ml       PHYSICAL EXAM:    Vitals signs reviewed  Constitutional   Awake and cooperative. NAD. Head/Neck   Normocephalic. Atraumatic. HEENT   No scleral icterus. EOMI. Heart   Regular rate and rhythm. No murmers. Lungs   Clear to auscultation bilaterally. Respirations unlaboured. Abdomen   Soft. Nontender. Nondistended. Skin   Skin color normal. No rashes. Extremities   No deformities. No peripheral edema. Left great toe bandaged, clean and dry. Neuro   Alert and oriented. No new deficits. Psych   Mood stable.  Affect normal. Additional Data:     Labs:  Results from last 7 days   Lab Units 10/28/23  0603 10/27/23  0620 10/26/23  0451   WBC Thousand/uL 6.70   < > 11.81*   HEMOGLOBIN g/dL 12.3   < > 12.2   HEMATOCRIT % 35.8*   < > 35.9*   PLATELETS Thousands/uL 267   < > 271   NEUTROS PCT %  --   --  74   LYMPHS PCT %  --   --  13*   MONOS PCT %  --   --  11   EOS PCT %  --   --  1    < > = values in this interval not displayed. Results from last 7 days   Lab Units 10/28/23  0603 10/26/23  0451 10/25/23  2030   SODIUM mmol/L 134*   < > 128*   POTASSIUM mmol/L 3.8   < > 4.2   CHLORIDE mmol/L 103   < > 95*   CO2 mmol/L 26   < > 21   BUN mg/dL 12   < > 25   CREATININE mg/dL 0.77   < > 1.10   ANION GAP mmol/L 5   < > 12   CALCIUM mg/dL 7.7*   < > 8.4   ALBUMIN g/dL  --   --  3.2*   TOTAL BILIRUBIN mg/dL  --   --  0.63   ALK PHOS U/L  --   --  98   ALT U/L  --   --  11   AST U/L  --   --  13   GLUCOSE RANDOM mg/dL 188*   < > 309*    < > = values in this interval not displayed.      Results from last 7 days   Lab Units 10/25/23  2030   INR  1.08     Results from last 7 days   Lab Units 10/28/23  1240 10/28/23  0755 10/27/23  2056 10/27/23  1618 10/27/23  1042 10/27/23  0731 10/26/23  2101 10/26/23  1534 10/26/23  1309 10/26/23  1053 10/26/23  0805 10/26/23  0050   POC GLUCOSE mg/dl 205* 177* 266* 298* 293* 238* 274* 424* 319* 375* 321* 402*     Results from last 7 days   Lab Units 10/26/23  0128   HEMOGLOBIN A1C % 14.7*     Results from last 7 days   Lab Units 10/25/23  2030   LACTIC ACID mmol/L 1.0   PROCALCITONIN ng/ml 1.11*       Lines/Drains:  Invasive Devices       Peripheral Intravenous Line  Duration             Peripheral IV 10/25/23 Distal;Right;Upper;Ventral (anterior) Arm 2 days                          Imaging: Reviewed radiology reports from this admission including: xray(s)    Recent Cultures (last 7 days):   Results from last 7 days   Lab Units 10/27/23  1300 10/25/23  2033 10/25/23  2030   BLOOD CULTURE   --  No Growth at 48 hrs. No Growth at 48 hrs. GRAM STAIN RESULT  Rare Polys*  3+ Gram negative rods*  2+ Gram positive cocci in pairs*  --   --    WOUND CULTURE  2+ Growth of Staphylococcus aureus*  --   --        Last 24 Hours Medication List:   Current Facility-Administered Medications   Medication Dose Route Frequency Provider Last Rate    acetaminophen  650 mg Oral Q6H PRN Tran San MD      cefazolin  2,000 mg Intravenous Q8H Jason Bobo DO 2,000 mg (10/28/23 0533)    heparin (porcine)  5,000 Units Subcutaneous [facility] Tran San MD      insulin glargine  26 Units Subcutaneous HS Jason Bobo DO      insulin lispro  1-6 Units Subcutaneous TID AC Tran San MD      insulin lispro  4 Units Subcutaneous TID With Meals Jason Bobo DO      traMADol  50 mg Oral Q6H PRN Tran San MD          Today, Patient Was Seen By: Jason Bobo DO    **Please Note: This note may have been constructed using a voice recognition system. **

## 2023-10-28 NOTE — PLAN OF CARE
Problem: Potential for Falls  Goal: Patient will remain free of falls  Description: INTERVENTIONS:  - Educate patient/family on patient safety including physical limitations  - Instruct patient to call for assistance with activity   - Consult OT/PT to assist with strengthening/mobility   - Keep Call bell within reach  - Keep bed low and locked with side rails adjusted as appropriate  - Keep care items and personal belongings within reach  - Initiate and maintain comfort rounds  - Make Fall Risk Sign visible to staff  - Apply yellow socks and bracelet for high fall risk patients  - Consider moving patient to room near nurses station  Outcome: Progressing     Problem: MOBILITY - ADULT  Goal: Maintain or return to baseline ADL function  Description: INTERVENTIONS:  -  Assess patient's ability to carry out ADLs; assess patient's baseline for ADL function and identify physical deficits which impact ability to perform ADLs (bathing, care of mouth/teeth, toileting, grooming, dressing, etc.)  - Assess/evaluate cause of self-care deficits   - Assess range of motion  - Assess patient's mobility; develop plan if impaired  - Assess patient's need for assistive devices and provide as appropriate  - Encourage maximum independence but intervene and supervise when necessary  - Involve family in performance of ADLs  - Assess for home care needs following discharge   - Consider OT consult to assist with ADL evaluation and planning for discharge  - Provide patient education as appropriate  Outcome: Progressing  Goal: Maintains/Returns to pre admission functional level  Description: INTERVENTIONS:  - Perform BMAT or MOVE assessment daily.   - Set and communicate daily mobility goal to care team and patient/family/caregiver. - Collaborate with rehabilitation services on mobility goals if consulted  - Perform Range of Motion 4 times a day. - Reposition patient every 2 hours.   - Dangle patient 3 times a day  - Stand patient 3 times a day  - Ambulate patient 3 times a day  - Out of bed to chair 3 times a day   - Out of bed for meals 3 times a day  - Out of bed for toileting  - Record patient progress and toleration of activity level   Outcome: Progressing     Problem: PAIN - ADULT  Goal: Verbalizes/displays adequate comfort level or baseline comfort level  Description: Interventions:  - Encourage patient to monitor pain and request assistance  - Assess pain using appropriate pain scale  - Administer analgesics based on type and severity of pain and evaluate response  - Implement non-pharmacological measures as appropriate and evaluate response  - Consider cultural and social influences on pain and pain management  - Notify physician/advanced practitioner if interventions unsuccessful or patient reports new pain  Outcome: Progressing     Problem: INFECTION - ADULT  Goal: Absence or prevention of progression during hospitalization  Description: INTERVENTIONS:  - Assess and monitor for signs and symptoms of infection  - Monitor lab/diagnostic results  - Monitor all insertion sites, i.e. indwelling lines, tubes, and drains  - Monitor endotracheal if appropriate and nasal secretions for changes in amount and color  - [place] appropriate cooling/warming therapies per order  - Administer medications as ordered  - Instruct and encourage patient and family to use good hand hygiene technique  - Identify and instruct in appropriate isolation precautions for identified infection/condition  Outcome: Progressing  Goal: Absence of fever/infection during neutropenic period  Description: INTERVENTIONS:  - Monitor WBC    Outcome: Progressing     Problem: SAFETY ADULT  Goal: Patient will remain free of falls  Description: INTERVENTIONS:  - Educate patient/family on patient safety including physical limitations  - Instruct patient to call for assistance with activity   - Consult OT/PT to assist with strengthening/mobility   - Keep Call bell within reach  - Keep bed low and locked with side rails adjusted as appropriate  - Keep care items and personal belongings within reach  - Initiate and maintain comfort rounds  - Make Fall Risk Sign visible to staff  - Apply yellow socks and bracelet for high fall risk patients  - Consider moving patient to room near nurses station  Outcome: Progressing  Goal: Maintain or return to baseline ADL function  Description: INTERVENTIONS:  -  Assess patient's ability to carry out ADLs; assess patient's baseline for ADL function and identify physical deficits which impact ability to perform ADLs (bathing, care of mouth/teeth, toileting, grooming, dressing, etc.)  - Assess/evaluate cause of self-care deficits   - Assess range of motion  - Assess patient's mobility; develop plan if impaired  - Assess patient's need for assistive devices and provide as appropriate  - Encourage maximum independence but intervene and supervise when necessary  - Involve family in performance of ADLs  - Assess for home care needs following discharge   - Consider OT consult to assist with ADL evaluation and planning for discharge  - Provide patient education as appropriate  Outcome: Progressing  Goal: Maintains/Returns to pre admission functional level  Description: INTERVENTIONS:  - Perform BMAT or MOVE assessment daily.   - Set and communicate daily mobility goal to care team and patient/family/caregiver. - Collaborate with rehabilitation services on mobility goals if consulted  - Perform Range of Motion 4 times a day. - Reposition patient every 2 hours.   - Dangle patient 3 times a day  - Stand patient 3 times a day  - Ambulate patient 3 times a day  - Out of bed to chair 3 times a day   - Out of bed for meals 3 times a day  - Out of bed for toileting  - Record patient progress and toleration of activity level   Outcome: Progressing     Problem: DISCHARGE PLANNING  Goal: Discharge to home or other facility with appropriate resources  Description: INTERVENTIONS:  - Identify barriers to discharge w/patient and caregiver  - Arrange for needed discharge resources and transportation as appropriate  - Identify discharge learning needs (meds, wound care, etc.)  - Arrange for interpretive services to assist at discharge as needed  - Refer to Case Management Department for coordinating discharge planning if the patient needs post-hospital services based on physician/advanced practitioner order or complex needs related to functional status, cognitive ability, or social support system  Outcome: Progressing     Problem: Knowledge Deficit  Goal: Patient/family/caregiver demonstrates understanding of disease process, treatment plan, medications, and discharge instructions  Description: Complete learning assessment and assess knowledge base.   Interventions:  - Provide teaching at level of understanding  - Provide teaching via preferred learning methods  Outcome: Progressing     Problem: SKIN/TISSUE INTEGRITY - ADULT  Goal: Skin Integrity remains intact(Skin Breakdown Prevention)  Description: Assess  -Inspect skin when repositioning, toileting, and assisting with ADLS  -Assess extremities for adequate circulation and sensation     Bed Management:  -Have minimal linens on bed & keep smooth, unwrinkled  -Change linens as needed when moist or perspiring    Toileting:  -Offer bedside commode    Activity:  -Encourage activity and walks on unit  -Encourage or provide ROM exercises   -Use appropriate equipment to lift or move patient in bed  Skin Care:  -Avoid use of baby powder, tape, friction and shearing, hot water or constrictive clothing  -Do not massage red bony areas    Next Steps:  Outcome: Progressing  Goal: Incision(s), wounds(s) or drain site(s) healing without S/S of infection  Description: INTERVENTIONS  - Assess and document dressing, incision, wound bed, drain sites and surrounding tissue  - Provide patient and family education  Outcome: Progressing  Goal: Pressure injury heals and does not worsen  Description: Interventions:  - Implement low air loss mattress or specialty surface (Criteria met)  - Apply silicone foam dressing  - Apply fecal or urinary incontinence containment device   - Utilize friction reducing device or surface for transfers   - Consider nutrition services referral as needed  Outcome: Progressing     Problem: Nutrition/Hydration-ADULT  Goal: Nutrient/Hydration intake appropriate for improving, restoring or maintaining nutritional needs  Description: Monitor and assess patient's nutrition/hydration status for malnutrition. Collaborate with interdisciplinary team and initiate plan and interventions as ordered. Monitor patient's weight and dietary intake as ordered or per policy. Utilize nutrition screening tool and intervene as necessary. Determine patient's food preferences and provide high-protein, high-caloric foods as appropriate.      INTERVENTIONS:  - Monitor oral intake, urinary output, labs, and treatment plans  - Assess nutrition and hydration status and recommend course of action  - Evaluate amount of meals eaten  - Assist patient with eating if necessary   - Allow adequate time for meals  - Recommend/ encourage appropriate diets, oral nutritional supplements, and vitamin/mineral supplements  - Order, calculate, and assess calorie counts as needed  - Recommend, monitor, and adjust tube feedings and TPN/PPN based on assessed needs  - Assess need for intravenous fluids  - Provide specific nutrition/hydration education as appropriate  - Include patient/family/caregiver in decisions related to nutrition  Outcome: Progressing

## 2023-10-29 PROBLEM — M86.9 OSTEOMYELITIS (HCC): Status: ACTIVE | Noted: 2023-10-29

## 2023-10-29 LAB
ANION GAP SERPL CALCULATED.3IONS-SCNC: 5 MMOL/L
BUN SERPL-MCNC: 15 MG/DL (ref 5–25)
CALCIUM SERPL-MCNC: 8.1 MG/DL (ref 8.4–10.2)
CHLORIDE SERPL-SCNC: 102 MMOL/L (ref 96–108)
CO2 SERPL-SCNC: 27 MMOL/L (ref 21–32)
CREAT SERPL-MCNC: 0.7 MG/DL (ref 0.6–1.3)
ERYTHROCYTE [DISTWIDTH] IN BLOOD BY AUTOMATED COUNT: 12.4 % (ref 11.6–15.1)
GFR SERPL CREATININE-BSD FRML MDRD: 96 ML/MIN/1.73SQ M
GLUCOSE SERPL-MCNC: 227 MG/DL (ref 65–140)
GLUCOSE SERPL-MCNC: 241 MG/DL (ref 65–140)
GLUCOSE SERPL-MCNC: 251 MG/DL (ref 65–140)
GLUCOSE SERPL-MCNC: 255 MG/DL (ref 65–140)
GLUCOSE SERPL-MCNC: 342 MG/DL (ref 65–140)
HCT VFR BLD AUTO: 38.9 % (ref 36.5–49.3)
HGB BLD-MCNC: 12.9 G/DL (ref 12–17)
MCH RBC QN AUTO: 28.4 PG (ref 26.8–34.3)
MCHC RBC AUTO-ENTMCNC: 33.2 G/DL (ref 31.4–37.4)
MCV RBC AUTO: 86 FL (ref 82–98)
PLATELET # BLD AUTO: 304 THOUSANDS/UL (ref 149–390)
PMV BLD AUTO: 9.8 FL (ref 8.9–12.7)
POTASSIUM SERPL-SCNC: 4.1 MMOL/L (ref 3.5–5.3)
RBC # BLD AUTO: 4.55 MILLION/UL (ref 3.88–5.62)
SODIUM SERPL-SCNC: 134 MMOL/L (ref 135–147)
WBC # BLD AUTO: 7.55 THOUSAND/UL (ref 4.31–10.16)

## 2023-10-29 PROCEDURE — 85027 COMPLETE CBC AUTOMATED: CPT | Performed by: INTERNAL MEDICINE

## 2023-10-29 PROCEDURE — 82948 REAGENT STRIP/BLOOD GLUCOSE: CPT

## 2023-10-29 PROCEDURE — 80048 BASIC METABOLIC PNL TOTAL CA: CPT | Performed by: INTERNAL MEDICINE

## 2023-10-29 PROCEDURE — 99233 SBSQ HOSP IP/OBS HIGH 50: CPT | Performed by: INTERNAL MEDICINE

## 2023-10-29 RX ORDER — INSULIN LISPRO 100 [IU]/ML
5 INJECTION, SOLUTION INTRAVENOUS; SUBCUTANEOUS
Status: DISCONTINUED | OUTPATIENT
Start: 2023-10-29 | End: 2023-10-30

## 2023-10-29 RX ORDER — INSULIN GLARGINE 100 [IU]/ML
34 INJECTION, SOLUTION SUBCUTANEOUS
Status: DISCONTINUED | OUTPATIENT
Start: 2023-10-29 | End: 2023-10-29

## 2023-10-29 RX ORDER — INSULIN GLARGINE 100 [IU]/ML
35 INJECTION, SOLUTION SUBCUTANEOUS
Status: DISCONTINUED | OUTPATIENT
Start: 2023-10-29 | End: 2023-11-01

## 2023-10-29 RX ADMIN — HEPARIN SODIUM 5000 UNITS: 5000 INJECTION INTRAVENOUS; SUBCUTANEOUS at 05:19

## 2023-10-29 RX ADMIN — HEPARIN SODIUM 5000 UNITS: 5000 INJECTION INTRAVENOUS; SUBCUTANEOUS at 13:12

## 2023-10-29 RX ADMIN — DAPTOMYCIN 500 MG: 500 INJECTION, POWDER, LYOPHILIZED, FOR SOLUTION INTRAVENOUS at 14:40

## 2023-10-29 RX ADMIN — INSULIN LISPRO 3 UNITS: 100 INJECTION, SOLUTION INTRAVENOUS; SUBCUTANEOUS at 08:43

## 2023-10-29 RX ADMIN — CEFAZOLIN SODIUM 2000 MG: 2 SOLUTION INTRAVENOUS at 05:19

## 2023-10-29 RX ADMIN — INSULIN LISPRO 4 UNITS: 100 INJECTION, SOLUTION INTRAVENOUS; SUBCUTANEOUS at 08:43

## 2023-10-29 RX ADMIN — INSULIN LISPRO 5 UNITS: 100 INJECTION, SOLUTION INTRAVENOUS; SUBCUTANEOUS at 15:32

## 2023-10-29 RX ADMIN — INSULIN GLARGINE 35 UNITS: 100 INJECTION, SOLUTION SUBCUTANEOUS at 21:44

## 2023-10-29 RX ADMIN — INSULIN LISPRO 3 UNITS: 100 INJECTION, SOLUTION INTRAVENOUS; SUBCUTANEOUS at 13:12

## 2023-10-29 RX ADMIN — INSULIN LISPRO 5 UNITS: 100 INJECTION, SOLUTION INTRAVENOUS; SUBCUTANEOUS at 13:12

## 2023-10-29 RX ADMIN — HEPARIN SODIUM 5000 UNITS: 5000 INJECTION INTRAVENOUS; SUBCUTANEOUS at 21:44

## 2023-10-29 NOTE — PLAN OF CARE
Problem: Potential for Falls  Goal: Patient will remain free of falls  Description: INTERVENTIONS:  - Educate patient/family on patient safety including physical limitations  - Instruct patient to call for assistance with activity   - Consult OT/PT to assist with strengthening/mobility   - Keep Call bell within reach  - Keep bed low and locked with side rails adjusted as appropriate  - Keep care items and personal belongings within reach  - Initiate and maintain comfort rounds  - Make Fall Risk Sign visible to staff  Problem: INFECTION - ADULT  Goal: Absence or prevention of progression during hospitalization  Description: INTERVENTIONS:  - Assess and monitor for signs and symptoms of infection  - Monitor lab/diagnostic results  - Monitor all insertion sites, i.e. indwelling lines, tubes, and drains  - Monitor endotracheal if appropriate and nasal secretions for changes in amount and color  - [place] appropriate cooling/warming therapies per order  - Administer medications as ordered  - Instruct and encourage patient and family to use good hand hygiene technique  - Identify and instruct in appropriate isolation precautions for identified infection/condition  Outcome: Progressing  Goal: Absence of fever/infection during neutropenic period  Description: INTERVENTIONS:  - Monitor WBC    Outcome: Progressing     - Apply yellow socks and bracelet for high fall risk patients  - Consider moving patient to room near nurses station  Outcome: Progressing

## 2023-10-29 NOTE — PLAN OF CARE
Problem: Potential for Falls  Goal: Patient will remain free of falls  Description: INTERVENTIONS:  - Educate patient/family on patient safety including physical limitations  - Instruct patient to call for assistance with activity   - Consult OT/PT to assist with strengthening/mobility   - Keep Call bell within reach  - Keep bed low and locked with side rails adjusted as appropriate  - Keep care items and personal belongings within reach  - Initiate and maintain comfort rounds  - Make Fall Risk Sign visible to staff  - Apply yellow socks and bracelet for high fall risk patients  - Consider moving patient to room near nurses station  Outcome: Progressing     Problem: MOBILITY - ADULT  Goal: Maintain or return to baseline ADL function  Description: INTERVENTIONS:  -  Assess patient's ability to carry out ADLs; assess patient's baseline for ADL function and identify physical deficits which impact ability to perform ADLs (bathing, care of mouth/teeth, toileting, grooming, dressing, etc.)  - Assess/evaluate cause of self-care deficits   - Assess range of motion  - Assess patient's mobility; develop plan if impaired  - Assess patient's need for assistive devices and provide as appropriate  - Encourage maximum independence but intervene and supervise when necessary  - Involve family in performance of ADLs  - Assess for home care needs following discharge   - Consider OT consult to assist with ADL evaluation and planning for discharge  - Provide patient education as appropriate  Outcome: Progressing  Goal: Maintains/Returns to pre admission functional level  Description: INTERVENTIONS:  - Perform BMAT or MOVE assessment daily.   - Set and communicate daily mobility goal to care team and patient/family/caregiver. - Collaborate with rehabilitation services on mobility goals if consulted  - Perform Range of Motion 4 times a day. - Reposition patient every 2 hours.   - Dangle patient 3 times a day  - Stand patient 3 times a day  - Ambulate patient 3 times a day  - Out of bed to chair 3 times a day   - Out of bed for meals 3 times a day  - Out of bed for toileting  - Record patient progress and toleration of activity level   Outcome: Progressing     Problem: PAIN - ADULT  Goal: Verbalizes/displays adequate comfort level or baseline comfort level  Description: Interventions:  - Encourage patient to monitor pain and request assistance  - Assess pain using appropriate pain scale  - Administer analgesics based on type and severity of pain and evaluate response  - Implement non-pharmacological measures as appropriate and evaluate response  - Consider cultural and social influences on pain and pain management  - Notify physician/advanced practitioner if interventions unsuccessful or patient reports new pain  Outcome: Progressing     Problem: INFECTION - ADULT  Goal: Absence or prevention of progression during hospitalization  Description: INTERVENTIONS:  - Assess and monitor for signs and symptoms of infection  - Monitor lab/diagnostic results  - Monitor all insertion sites, i.e. indwelling lines, tubes, and drains  - Monitor endotracheal if appropriate and nasal secretions for changes in amount and color  - [place] appropriate cooling/warming therapies per order  - Administer medications as ordered  - Instruct and encourage patient and family to use good hand hygiene technique  - Identify and instruct in appropriate isolation precautions for identified infection/condition  Outcome: Progressing  Goal: Absence of fever/infection during neutropenic period  Description: INTERVENTIONS:  - Monitor WBC    Outcome: Progressing     Problem: SAFETY ADULT  Goal: Patient will remain free of falls  Description: INTERVENTIONS:  - Educate patient/family on patient safety including physical limitations  - Instruct patient to call for assistance with activity   - Consult OT/PT to assist with strengthening/mobility   - Keep Call bell within reach  - Keep bed low and locked with side rails adjusted as appropriate  - Keep care items and personal belongings within reach  - Initiate and maintain comfort rounds  - Make Fall Risk Sign visible to staff  - Apply yellow socks and bracelet for high fall risk patients  - Consider moving patient to room near nurses station  Outcome: Progressing  Goal: Maintain or return to baseline ADL function  Description: INTERVENTIONS:  -  Assess patient's ability to carry out ADLs; assess patient's baseline for ADL function and identify physical deficits which impact ability to perform ADLs (bathing, care of mouth/teeth, toileting, grooming, dressing, etc.)  - Assess/evaluate cause of self-care deficits   - Assess range of motion  - Assess patient's mobility; develop plan if impaired  - Assess patient's need for assistive devices and provide as appropriate  - Encourage maximum independence but intervene and supervise when necessary  - Involve family in performance of ADLs  - Assess for home care needs following discharge   - Consider OT consult to assist with ADL evaluation and planning for discharge  - Provide patient education as appropriate  Outcome: Progressing  Goal: Maintains/Returns to pre admission functional level  Description: INTERVENTIONS:  - Perform BMAT or MOVE assessment daily.   - Set and communicate daily mobility goal to care team and patient/family/caregiver. - Collaborate with rehabilitation services on mobility goals if consulted  - Perform Range of Motion 4 times a day. - Reposition patient every 2 hours.   - Dangle patient 3 times a day  - Stand patient 3 times a day  - Ambulate patient 3 times a day  - Out of bed to chair 3 times a day   - Out of bed for meals 3 times a day  - Out of bed for toileting  - Record patient progress and toleration of activity level   Outcome: Progressing     Problem: DISCHARGE PLANNING  Goal: Discharge to home or other facility with appropriate resources  Description: INTERVENTIONS:  - Identify barriers to discharge w/patient and caregiver  - Arrange for needed discharge resources and transportation as appropriate  - Identify discharge learning needs (meds, wound care, etc.)  - Arrange for interpretive services to assist at discharge as needed  - Refer to Case Management Department for coordinating discharge planning if the patient needs post-hospital services based on physician/advanced practitioner order or complex needs related to functional status, cognitive ability, or social support system  Outcome: Progressing     Problem: Knowledge Deficit  Goal: Patient/family/caregiver demonstrates understanding of disease process, treatment plan, medications, and discharge instructions  Description: Complete learning assessment and assess knowledge base.   Interventions:  - Provide teaching at level of understanding  - Provide teaching via preferred learning methods  Outcome: Progressing     Problem: SKIN/TISSUE INTEGRITY - ADULT  Goal: Skin Integrity remains intact(Skin Breakdown Prevention)  Description: Assess:  -Inspect skin when repositioning, toileting, and assisting with ADLS  -Assess extremities for adequate circulation and sensation     Bed Management:  -Have minimal linens on bed & keep smooth, unwrinkled  -Change linens as needed when moist or perspiring    Toileting:  -Offer bedside commode    Activity:  -Encourage activity and walks on unit  -Encourage or provide ROM exercises   -Use appropriate equipment to lift or move patient in bed    Skin Care:  -Avoid use of baby powder, tape, friction and shearing, hot water or constrictive clothing  -Do not massage red bony areas    Next Steps:  Outcome: Progressing  Goal: Incision(s), wounds(s) or drain site(s) healing without S/S of infection  Description: INTERVENTIONS  - Assess and document dressing, incision, wound bed, drain sites and surrounding tissue  - Provide patient and family education  Outcome: Progressing  Goal: Pressure injury heals and does not worsen  Description: Interventions:  - Implement low air loss mattress or specialty surface (Criteria met)  - Apply silicone foam dressing  - Apply fecal or urinary incontinence containment device   - Utilize friction reducing device or surface for transfers   - Consider nutrition services referral as needed  Outcome: Progressing     Problem: Nutrition/Hydration-ADULT  Goal: Nutrient/Hydration intake appropriate for improving, restoring or maintaining nutritional needs  Description: Monitor and assess patient's nutrition/hydration status for malnutrition. Collaborate with interdisciplinary team and initiate plan and interventions as ordered. Monitor patient's weight and dietary intake as ordered or per policy. Utilize nutrition screening tool and intervene as necessary. Determine patient's food preferences and provide high-protein, high-caloric foods as appropriate.      INTERVENTIONS:  - Monitor oral intake, urinary output, labs, and treatment plans  - Assess nutrition and hydration status and recommend course of action  - Evaluate amount of meals eaten  - Assist patient with eating if necessary   - Allow adequate time for meals  - Recommend/ encourage appropriate diets, oral nutritional supplements, and vitamin/mineral supplements  - Order, calculate, and assess calorie counts as needed  - Recommend, monitor, and adjust tube feedings and TPN/PPN based on assessed needs  - Assess need for intravenous fluids  - Provide specific nutrition/hydration education as appropriate  - Include patient/family/caregiver in decisions related to nutrition  Outcome: Progressing     Problem: Prexisting or High Potential for Compromised Skin Integrity  Goal: Skin integrity is maintained or improved  Description: INTERVENTIONS:  - Identify patients at risk for skin breakdown  - Assess and monitor skin integrity  - Assess and monitor nutrition and hydration status  - Monitor labs   - Assess for incontinence   - Turn and reposition patient  - Assist with mobility/ambulation  - Relieve pressure over bony prominences  - Avoid friction and shearing  - Provide appropriate hygiene as needed including keeping skin clean and dry  - Evaluate need for skin moisturizer/barrier cream  - Collaborate with interdisciplinary team   - Patient/family teaching  - Consider wound care consult   Outcome: Progressing

## 2023-10-29 NOTE — PROGRESS NOTES
[address]  Progress Note  Name: Tristin Pérez  MRN: [id number]  Unit/Bed#: -01 I Date of Admission: 10/25/2023   Date of Service: 10/29/2023 I Hospital Day: 4    Assessment/Plan   * Diabetic ulcer of left great toe [facility])  Assessment & Plan  Nonhealing ulceration of the left great toe, exacerbated by poorly controlled diabetes as evidenced by hemoglobin A1c of 14.7% as an outpatient  X-ray of the left great toe concerning for osteomyelitis at the distal aspect of the toe. Discontinue vancomycin and cefepime; no signs of systemic illness. We will place on IV cefazolin until surgery. Bilateral ABIs WNL  MRI to assess extent of infection completed; follow-up final read  Left hallux amputation planned for Tuesday 10/31        Osteomyelitis [facility])  Assessment & Plan  Osteomyelitis confirmed on x-ray  Follow-up MRI for final read to assess extent of the infection  Wound culture growing MRSA and Strep    Stop cefazolin  Discussed with pharmacy; vancomycin best choice given JOSE. We will start on IV daptomycin 6 mg/kg daily  Montiro daily CBC/BMP for toxicity    Diabetes mellitus ([address])  Assessment & Plan  Blood glucose very poorly controlled with hemoglobin A1c of 14.7%. Last A1c on record was 4 years ago.   Blood glucose better but still uncontrolled in the low to mid 200s    Increase Lantus to 35 units nightly  Increase mealtime Humalog to 5 units 3 times daily  Cont ISS for correctional coverage  Hypoglycemia protocol  Consistent carb diet  Continue to monitor and titrate as necessary        Vertigo  Assessment & Plan  Dents with vertiginous symptoms  No other focal symptoms to suggest CVA, and no nystagmus, CTA of the head/neck negative  Symptoms were multifactorial in the setting of sepsis, significant hyperglycemia, and electrolyte derangements  His dizziness has now resolved with treatment as above    Pseudohyponatremia  Assessment & Plan  Improving with better BG control  monitor daily BMP        VTE Pharmacologic Prophylaxis:   Moderate Risk (Score 3-4) - Pharmacological DVT Prophylaxis Ordered: heparin. Patient Centered Rounds: I performed bedside rounds with nursing staff today. Discussions with Specialists or Other Care Team Provider: NIDIA    Education and Discussions with Family / Patient: Patient declined call to . Total Time Spent on Date of Encounter in care of patient: 45 mins. This time was spent on one or more of the following: performing physical exam; counseling and coordination of care; obtaining or reviewing history; documenting in the medical record; reviewing/ordering tests, medications or procedures; communicating with other healthcare professionals and discussing with patient's family/caregivers. Current Length of Stay: 4 day(s)  Current Patient Status: Inpatient   Certification Statement: The patient will continue to require additional inpatient hospital stay due to IV abx's, surgical interventions, medication titration  Discharge Plan: Anticipate discharge in >72 hrs to home. Code Status: Level 3 - DNAR and DNI    Subjective:   Patient seen and examined. Overall doing well today. Afebrile. No events overnight. Pain is controlled. Objective:     Vitals:   Temp (24hrs), Av.1 °F (36.7 °C), Min:97.8 °F (36.6 °C), Max:98.5 °F (36.9 °C)    Temp:  [97.8 °F (36.6 °C)-98.5 °F (36.9 °C)] 97.8 °F (36.6 °C)  HR:  [70-85] 70  Resp:  [18-19] 19  BP: (122-140)/(73-79) 122/73  SpO2:  [92 %-98 %] 98 %  Body mass index is 26.76 kg/m². Input and Output Summary (last 24 hours): Intake/Output Summary (Last 24 hours) at 10/29/2023 1037  Last data filed at 10/28/2023 1753  Gross per 24 hour   Intake 240 ml   Output --   Net 240 ml       PHYSICAL EXAM:    Vitals signs reviewed  Constitutional   Awake and cooperative. NAD. Head/Neck   Normocephalic. Atraumatic. HEENT   No scleral icterus. EOMI. Heart   Regular rate and rhythm. No murmers. Lungs   Clear to auscultation bilaterally. Respirations unlaboured. Abdomen   Soft. Nontender. Nondistended. Skin   Skin color normal. No rashes. Extremities   No deformities. No peripheral edema. Left great toe bandaged, clean and dry. Neuro   Alert and oriented. No new deficits. Psych   Mood stable. Affect normal.         Additional Data:     Labs:  Results from last 7 days   Lab Units 10/29/23  0519 10/27/23  0620 10/26/23  0451   WBC Thousand/uL 7.55   < > 11.81*   HEMOGLOBIN g/dL 12.9   < > 12.2   HEMATOCRIT % 38.9   < > 35.9*   PLATELETS Thousands/uL 304   < > 271   NEUTROS PCT %  --   --  74   LYMPHS PCT %  --   --  13*   MONOS PCT %  --   --  11   EOS PCT %  --   --  1    < > = values in this interval not displayed. Results from last 7 days   Lab Units 10/29/23  0519 10/26/23  0451 10/25/23  2030   SODIUM mmol/L 134*   < > 128*   POTASSIUM mmol/L 4.1   < > 4.2   CHLORIDE mmol/L 102   < > 95*   CO2 mmol/L 27   < > 21   BUN mg/dL 15   < > 25   CREATININE mg/dL 0.70   < > 1.10   ANION GAP mmol/L 5   < > 12   CALCIUM mg/dL 8.1*   < > 8.4   ALBUMIN g/dL  --   --  3.2*   TOTAL BILIRUBIN mg/dL  --   --  0.63   ALK PHOS U/L  --   --  98   ALT U/L  --   --  11   AST U/L  --   --  13   GLUCOSE RANDOM mg/dL 255*   < > 309*    < > = values in this interval not displayed.      Results from last 7 days   Lab Units 10/25/23  2030   INR  1.08     Results from last 7 days   Lab Units 10/29/23  0739 10/28/23  2109 10/28/23  1554 10/28/23  1240 10/28/23  0755 10/27/23  2056 10/27/23  1618 10/27/23  1042 10/27/23  0731 10/26/23  2101 10/26/23  1534 10/26/23  1309   POC GLUCOSE mg/dl 251* 298* 255* 205* 177* 266* 298* 293* 238* 274* 424* 319*     Results from last 7 days   Lab Units 10/26/23  0128   HEMOGLOBIN A1C % 14.7*     Results from last 7 days   Lab Units 10/25/23  2030   LACTIC ACID mmol/L 1.0   PROCALCITONIN ng/ml 1.11*       Lines/Drains:  Invasive Devices       Peripheral Intravenous Line Duration             Peripheral IV 10/29/23 Left Wrist <1 day                          Imaging: Reviewed radiology reports from this admission including: xray(s)    Recent Cultures (last 7 days):   Results from last 7 days   Lab Units 10/27/23  1300 10/25/23  2033 10/25/23  2030   BLOOD CULTURE   --  No Growth at 72 hrs. No Growth at 72 hrs. GRAM STAIN RESULT  Rare Polys*  3+ Gram negative rods*  2+ Gram positive cocci in pairs*  --   --    WOUND CULTURE  2+ Growth of Methicillin Resistant Staphylococcus aureus*  1+ Growth of Beta Hemolytic Streptococcus Group G*  2+ Growth of  --   --        Last 24 Hours Medication List:   Current Facility-Administered Medications   Medication Dose Route Frequency Provider Last Rate    acetaminophen  650 mg Oral Q6H PRN Jemma Zapien MD      cefazolin  2,000 mg Intravenous Q8H Naveed Bobo DO 2,000 mg (10/29/23 0519)    heparin (porcine)  5,000 Units Subcutaneous [facility] Jemma Zapien MD      insulin glargine  35 Units Subcutaneous HS Naveed Bobo DO      insulin lispro  1-6 Units Subcutaneous TID AC Jemma Zapien MD      insulin lispro  5 Units Subcutaneous TID With Meals Naveed Bobo DO      traMADol  50 mg Oral Q6H PRN Jemma Zapien MD          Today, Patient Was Seen By: Naveed Bobo DO    **Please Note: This note may have been constructed using a voice recognition system. **

## 2023-10-30 PROBLEM — R79.89 PSEUDOHYPONATREMIA: Status: RESOLVED | Noted: 2023-10-25 | Resolved: 2023-10-30

## 2023-10-30 PROBLEM — R42 VERTIGO: Status: RESOLVED | Noted: 2023-10-25 | Resolved: 2023-10-30

## 2023-10-30 LAB
ANION GAP SERPL CALCULATED.3IONS-SCNC: 5 MMOL/L
BACTERIA BLD CULT: NORMAL
BACTERIA BLD CULT: NORMAL
BACTERIA WND AEROBE CULT: ABNORMAL
BUN SERPL-MCNC: 18 MG/DL (ref 5–25)
CALCIUM SERPL-MCNC: 8.3 MG/DL (ref 8.4–10.2)
CHLORIDE SERPL-SCNC: 105 MMOL/L (ref 96–108)
CO2 SERPL-SCNC: 26 MMOL/L (ref 21–32)
CREAT SERPL-MCNC: 0.69 MG/DL (ref 0.6–1.3)
ERYTHROCYTE [DISTWIDTH] IN BLOOD BY AUTOMATED COUNT: 12.3 % (ref 11.6–15.1)
GFR SERPL CREATININE-BSD FRML MDRD: 97 ML/MIN/1.73SQ M
GLUCOSE SERPL-MCNC: 132 MG/DL (ref 65–140)
GLUCOSE SERPL-MCNC: 148 MG/DL (ref 65–140)
GLUCOSE SERPL-MCNC: 230 MG/DL (ref 65–140)
GLUCOSE SERPL-MCNC: 235 MG/DL (ref 65–140)
GLUCOSE SERPL-MCNC: 285 MG/DL (ref 65–140)
GRAM STN SPEC: ABNORMAL
HCT VFR BLD AUTO: 38.9 % (ref 36.5–49.3)
HGB BLD-MCNC: 13.1 G/DL (ref 12–17)
MCH RBC QN AUTO: 28.7 PG (ref 26.8–34.3)
MCHC RBC AUTO-ENTMCNC: 33.7 G/DL (ref 31.4–37.4)
MCV RBC AUTO: 85 FL (ref 82–98)
PLATELET # BLD AUTO: 307 THOUSANDS/UL (ref 149–390)
PMV BLD AUTO: 9.4 FL (ref 8.9–12.7)
POTASSIUM SERPL-SCNC: 4 MMOL/L (ref 3.5–5.3)
RBC # BLD AUTO: 4.57 MILLION/UL (ref 3.88–5.62)
SODIUM SERPL-SCNC: 136 MMOL/L (ref 135–147)
WBC # BLD AUTO: 7.87 THOUSAND/UL (ref 4.31–10.16)

## 2023-10-30 PROCEDURE — 85027 COMPLETE CBC AUTOMATED: CPT | Performed by: INTERNAL MEDICINE

## 2023-10-30 PROCEDURE — 80048 BASIC METABOLIC PNL TOTAL CA: CPT | Performed by: INTERNAL MEDICINE

## 2023-10-30 PROCEDURE — 99232 SBSQ HOSP IP/OBS MODERATE 35: CPT | Performed by: INTERNAL MEDICINE

## 2023-10-30 PROCEDURE — 82948 REAGENT STRIP/BLOOD GLUCOSE: CPT

## 2023-10-30 RX ORDER — INSULIN LISPRO 100 [IU]/ML
5 INJECTION, SOLUTION INTRAVENOUS; SUBCUTANEOUS
Status: DISCONTINUED | OUTPATIENT
Start: 2023-10-30 | End: 2023-11-01

## 2023-10-30 RX ORDER — INSULIN LISPRO 100 [IU]/ML
6 INJECTION, SOLUTION INTRAVENOUS; SUBCUTANEOUS
Status: DISCONTINUED | OUTPATIENT
Start: 2023-10-30 | End: 2023-10-30

## 2023-10-30 RX ADMIN — INSULIN LISPRO 3 UNITS: 100 INJECTION, SOLUTION INTRAVENOUS; SUBCUTANEOUS at 17:18

## 2023-10-30 RX ADMIN — INSULIN LISPRO 3 UNITS: 100 INJECTION, SOLUTION INTRAVENOUS; SUBCUTANEOUS at 12:05

## 2023-10-30 RX ADMIN — INSULIN LISPRO 5 UNITS: 100 INJECTION, SOLUTION INTRAVENOUS; SUBCUTANEOUS at 17:19

## 2023-10-30 RX ADMIN — DAPTOMYCIN 500 MG: 500 INJECTION, POWDER, LYOPHILIZED, FOR SOLUTION INTRAVENOUS at 13:15

## 2023-10-30 RX ADMIN — INSULIN GLARGINE 35 UNITS: 100 INJECTION, SOLUTION SUBCUTANEOUS at 21:13

## 2023-10-30 RX ADMIN — HEPARIN SODIUM 5000 UNITS: 5000 INJECTION INTRAVENOUS; SUBCUTANEOUS at 06:03

## 2023-10-30 RX ADMIN — HEPARIN SODIUM 5000 UNITS: 5000 INJECTION INTRAVENOUS; SUBCUTANEOUS at 21:13

## 2023-10-30 RX ADMIN — HEPARIN SODIUM 5000 UNITS: 5000 INJECTION INTRAVENOUS; SUBCUTANEOUS at 15:16

## 2023-10-30 RX ADMIN — INSULIN LISPRO 5 UNITS: 100 INJECTION, SOLUTION INTRAVENOUS; SUBCUTANEOUS at 12:05

## 2023-10-30 NOTE — PROGRESS NOTES
[address]  Progress Note  Name: Aj Rowland  MRN: [id number]  Unit/Bed#: -01 I Date of Admission: 10/25/2023   Date of Service: 10/30/2023 I Hospital Day: 5    Assessment/Plan   * Diabetic ulcer of left great toe [facility])  Assessment & Plan  Nonhealing ulceration of the left great toe, exacerbated by poorly controlled diabetes as evidenced by hemoglobin A1c of 14.7% as an outpatient  X-ray of the left great toe concerning for osteomyelitis at the distal aspect of the toe. Reviewed MRI: Osteomyelitis identified in the distal proximal and distal phalanxes    Wound cultures grew MRSA; based on JOSE patient has been placed on daptomycin. Would plan to continue antibiotics 3 days post amputation, assuming surgical care obtained. Bilateral ABIs WNL  Left hallux amputation planned for tomorrow Tuesday 10/31; NPO at [place]        Osteomyelitis [facility])  Assessment & Plan  Osteomyelitis confirmed on x-ray  Follow-up MRI for final read to assess extent of the infection  Wound culture growing MRSA and Strep    Cont IV daptomycin  Monitor daily CBC/BMP for toxicity  Would anticipate IV abx 3 post amputation, assuming surgical cure. Consider ID consult    Diabetes mellitus ([address])  Assessment & Plan  Blood glucose very poorly controlled with hemoglobin A1c of 14.7%. Last A1c on record was 4 years ago.   BG much improved from admission    cont Lantus to 35 units nightly  cont mealtime Humalog to 5 units 3 times daily  Cont ISS for correctional coverage  Hypoglycemia protocol  Consistent carb diet  Continue to monitor and titrate as necessary        Vertigo-resolved as of 10/30/2023  Assessment & Plan  Dents with vertiginous symptoms  No other focal symptoms to suggest CVA, and no nystagmus, CTA of the head/neck negative  Symptoms were multifactorial in the setting of sepsis, significant hyperglycemia, and electrolyte derangements  His dizziness has now resolved with treatment as above    Pseudohyponatremia-resolved as of 10/30/2023  Assessment & Plan  Improving with better BG control  monitor daily BMP        VTE Pharmacologic Prophylaxis:   Moderate Risk (Score 3-4) - Pharmacological DVT Prophylaxis Ordered: heparin. Patient Centered Rounds: I performed bedside rounds with nursing staff today. Discussions with Specialists or Other Care Team Provider: NIDIA    Education and Discussions with Family / Patient: Patient declined call to . Total Time Spent on Date of Encounter in care of patient: 40 mins. This time was spent on one or more of the following: performing physical exam; counseling and coordination of care; obtaining or reviewing history; documenting in the medical record; reviewing/ordering tests, medications or procedures; communicating with other healthcare professionals and discussing with patient's family/caregivers. Current Length of Stay: 5 day(s)  Current Patient Status: Inpatient   Certification Statement: The patient will continue to require additional inpatient hospital stay due to IV abx's, surgical interventions, medication titration  Discharge Plan: Anticipate discharge in >72 hrs to home. Code Status: Level 3 - DNAR and DNI    Subjective:   Patient seen and examined. Uneventful evening. Patient remains afebrile. His pain is controlled. No new complaints. Objective:     Vitals:   Temp (24hrs), Av.2 °F (36.8 °C), Min:98 °F (36.7 °C), Max:98.4 °F (36.9 °C)    Temp:  [98 °F (36.7 °C)-98.4 °F (36.9 °C)] 98.2 °F (36.8 °C)  HR:  [72-88] 88  Resp:  [18] 18  BP: (134-139)/(76-79) 134/79  SpO2:  [95 %-98 %] 96 %  Body mass index is 25.58 kg/m². Input and Output Summary (last 24 hours): Intake/Output Summary (Last 24 hours) at 10/30/2023 1008  Last data filed at 10/30/2023 0844  Gross per 24 hour   Intake 240 ml   Output 250 ml   Net -10 ml       PHYSICAL EXAM:    Vitals signs reviewed  Constitutional   Awake and cooperative. NAD. Head/Neck   Normocephalic. Atraumatic. HEENT   No scleral icterus. EOMI. Heart   Regular rate and rhythm. No murmers. Lungs   Clear to auscultation bilaterally. Respirations unlaboured. Abdomen   Soft. Nontender. Nondistended. Skin   Skin color normal. No rashes. Extremities   No deformities. No peripheral edema. Left foot is bandaged, clean and dry. Neuro   Alert and oriented. No new deficits. Psych   Mood stable. Affect normal.         Additional Data:     Labs:  Results from last 7 days   Lab Units 10/30/23  0534 10/27/23  0620 10/26/23  0451   WBC Thousand/uL 7.87   < > 11.81*   HEMOGLOBIN g/dL 13.1   < > 12.2   HEMATOCRIT % 38.9   < > 35.9*   PLATELETS Thousands/uL 307   < > 271   NEUTROS PCT %  --   --  74   LYMPHS PCT %  --   --  13*   MONOS PCT %  --   --  11   EOS PCT %  --   --  1    < > = values in this interval not displayed. Results from last 7 days   Lab Units 10/30/23  0534 10/26/23  0451 10/25/23  2030   SODIUM mmol/L 136   < > 128*   POTASSIUM mmol/L 4.0   < > 4.2   CHLORIDE mmol/L 105   < > 95*   CO2 mmol/L 26   < > 21   BUN mg/dL 18   < > 25   CREATININE mg/dL 0.69   < > 1.10   ANION GAP mmol/L 5   < > 12   CALCIUM mg/dL 8.3*   < > 8.4   ALBUMIN g/dL  --   --  3.2*   TOTAL BILIRUBIN mg/dL  --   --  0.63   ALK PHOS U/L  --   --  98   ALT U/L  --   --  11   AST U/L  --   --  13   GLUCOSE RANDOM mg/dL 148*   < > 309*    < > = values in this interval not displayed.      Results from last 7 days   Lab Units 10/25/23  2030   INR  1.08     Results from last 7 days   Lab Units 10/30/23  0743 10/29/23  2044 10/29/23  1509 10/29/23  1108 10/29/23  0739 10/28/23  2109 10/28/23  1554 10/28/23  1240 10/28/23  0755 10/27/23  2056 10/27/23  1618 10/27/23  1042   POC GLUCOSE mg/dl 132 227* 342* 241* 251* 298* 255* 205* 177* 266* 298* 293*     Results from last 7 days   Lab Units 10/26/23  0128   HEMOGLOBIN A1C % 14.7*     Results from last 7 days   Lab Units 10/25/23  2030   LACTIC ACID mmol/L 1.0   PROCALCITONIN ng/ml 1.11*       Lines/Drains:  Invasive Devices       Peripheral Intravenous Line  Duration             Peripheral IV 10/29/23 Left Wrist 1 day                          Imaging: Reviewed radiology reports from this admission including: MRI left foot    Recent Cultures (last 7 days):   Results from last 7 days   Lab Units 10/27/23  1300 10/25/23  2033 10/25/23  2030   BLOOD CULTURE   --  No Growth After 4 Days. No Growth After 4 Days. GRAM STAIN RESULT  Rare Polys*  3+ Gram negative rods*  2+ Gram positive cocci in pairs*  --   --    WOUND CULTURE  2+ Growth of Methicillin Resistant Staphylococcus aureus*  1+ Growth of Beta Hemolytic Streptococcus Group G*  2+ Growth of  --   --        Last 24 Hours Medication List:   Current Facility-Administered Medications   Medication Dose Route Frequency Provider Last Rate    acetaminophen  650 mg Oral Q6H PRN Coco Macias MD      DAPTOmycin  6 mg/kg Intravenous Q24H Stephen Bobo  mg (10/29/23 1440)    heparin (porcine)  5,000 Units Subcutaneous [facility] Coco Macias MD      insulin glargine  35 Units Subcutaneous HS Stephen Bobo DO      insulin lispro  1-6 Units Subcutaneous TID AC Coco Macias MD      insulin lispro  5 Units Subcutaneous TID With Meals Stephen Bobo DO      traMADol  50 mg Oral Q6H PRN Coco Macias MD          Today, Patient Was Seen By: Stephen Bobo DO    **Please Note: This note may have been constructed using a voice recognition system. **

## 2023-10-30 NOTE — PLAN OF CARE
Problem: MOBILITY - ADULT  Goal: Maintain or return to baseline ADL function  Description: INTERVENTIONS:  -  Assess patient's ability to carry out ADLs; assess patient's baseline for ADL function and identify physical deficits which impact ability to perform ADLs (bathing, care of mouth/teeth, toileting, grooming, dressing, etc.)  - Assess/evaluate cause of self-care deficits   - Assess range of motion  - Assess patient's mobility; develop plan if impaired  - Assess patient's need for assistive devices and provide as appropriate  - Encourage maximum independence but intervene and supervise when necessary  - Involve family in performance of ADLs  - Assess for home care needs following discharge   - Consider OT consult to assist with ADL evaluation and planning for discharge  - Provide patient education as appropriate  Outcome: Progressing     Problem: PAIN - ADULT  Goal: Verbalizes/displays adequate comfort level or baseline comfort level  Description: Interventions:  - Encourage patient to monitor pain and request assistance  - Assess pain using appropriate pain scale  - Administer analgesics based on type and severity of pain and evaluate response  - Implement non-pharmacological measures as appropriate and evaluate response  - Consider cultural and social influences on pain and pain management  - Notify physician/advanced practitioner if interventions unsuccessful or patient reports new pain  Outcome: Progressing     Problem: INFECTION - ADULT  Goal: Absence or prevention of progression during hospitalization  Description: INTERVENTIONS:  - Assess and monitor for signs and symptoms of infection  - Monitor lab/diagnostic results  - Monitor all insertion sites, i.e. indwelling lines, tubes, and drains  - Monitor endotracheal if appropriate and nasal secretions for changes in amount and color  - [place] appropriate cooling/warming therapies per order  - Administer medications as ordered  - Instruct and encourage patient and family to use good hand hygiene technique  - Identify and instruct in appropriate isolation precautions for identified infection/condition  Outcome: Progressing  Goal: Absence of fever/infection during neutropenic period  Description: INTERVENTIONS:  - Monitor WBC    Outcome: Progressing    Goal: Maintain or return to baseline ADL function  Description: INTERVENTIONS:  -  Assess patient's ability to carry out ADLs; assess patient's baseline for ADL function and identify physical deficits which impact ability to perform ADLs (bathing, care of mouth/teeth, toileting, grooming, dressing, etc.)  - Assess/evaluate cause of self-care deficits   - Assess range of motion  - Assess patient's mobility; develop plan if impaired  - Assess patient's need for assistive devices and provide as appropriate  - Encourage maximum independence but intervene and supervise when necessary  - Involve family in performance of ADLs  - Assess for home care needs following discharge   - Consider OT consult to assist with ADL evaluation and planning for discharge  - Provide patient education as appropriate  Outcome: Progressing     Problem: DISCHARGE PLANNING  Goal: Discharge to home or other facility with appropriate resources  Description: INTERVENTIONS:  - Identify barriers to discharge w/patient and caregiver  - Arrange for needed discharge resources and transportation as appropriate  - Identify discharge learning needs (meds, wound care, etc.)  - Arrange for interpretive services to assist at discharge as needed  - Refer to Case Management Department for coordinating discharge planning if the patient needs post-hospital services based on physician/advanced practitioner order or complex needs related to functional status, cognitive ability, or social support system  Outcome: Progressing     Problem: Knowledge Deficit  Goal: Patient/family/caregiver demonstrates understanding of disease process, treatment plan, medications, and discharge instructions  Description: Complete learning assessment and assess knowledge base. Interventions:  - Provide teaching at level of understanding  - Provide teaching via preferred learning methods  Outcome: Progressing       Problem: Nutrition/Hydration-ADULT  Goal: Nutrient/Hydration intake appropriate for improving, restoring or maintaining nutritional needs  Description: Monitor and assess patient's nutrition/hydration status for malnutrition. Collaborate with interdisciplinary team and initiate plan and interventions as ordered. Monitor patient's weight and dietary intake as ordered or per policy. Utilize nutrition screening tool and intervene as necessary. Determine patient's food preferences and provide high-protein, high-caloric foods as appropriate.      INTERVENTIONS:  - Monitor oral intake, urinary output, labs, and treatment plans  - Assess nutrition and hydration status and recommend course of action  - Evaluate amount of meals eaten  - Assist patient with eating if necessary   - Allow adequate time for meals  - Recommend/ encourage appropriate diets, oral nutritional supplements, and vitamin/mineral supplements  - Order, calculate, and assess calorie counts as needed  - Recommend, monitor, and adjust tube feedings and TPN/PPN based on assessed needs  - Assess need for intravenous fluids  - Provide specific nutrition/hydration education as appropriate  - Include patient/family/caregiver in decisions related to nutrition  Outcome: Progressing     Problem: Prexisting or High Potential for Compromised Skin Integrity  Goal: Skin integrity is maintained or improved  Description: INTERVENTIONS:  - Identify patients at risk for skin breakdown  - Assess and monitor skin integrity  - Assess and monitor nutrition and hydration status  - Monitor labs   - Assess for incontinence   - Turn and reposition patient  - Assist with mobility/ambulation  - Relieve pressure over bony prominences  - Avoid friction and shearing  - Provide appropriate hygiene as needed including keeping skin clean and dry  - Evaluate need for skin moisturizer/barrier cream  - Collaborate with interdisciplinary team   - Patient/family teaching  - Consider wound care consult   Outcome: Progressing

## 2023-10-30 NOTE — CASE MANAGEMENT
Case Management Progress Note    Patient name Cosme Lange  Location [address] [facility] 307/-72 MRN [id number]  : 1955 Date 10/30/2023       LOS (days): 5  Geometric Mean LOS (GMLOS) (days):   Days to GMLOS:        OBJECTIVE:        Current admission status: Inpatient  Preferred Pharmacy:   [address] [place] [id number]  Phone: [phone] Fax: [phone]    [address] [place] [address]  Phone: [phone] Fax: [phone]    Primary Care Provider: Yash Nguyen MD    Primary Insurance: [facility]  Secondary Insurance:     PROGRESS NOTE:    CM continues to follow case via chart review and SLIM interdisciplinary rounds. Plan- CM will continue to monitor and coordinate d/c planning support options based on team recommendations and patients input for choice  s/p surgery for left hallux amputation tomorrow 10/31.

## 2023-10-31 ENCOUNTER — APPOINTMENT (INPATIENT)
Dept: RADIOLOGY | Facility: HOSPITAL | Age: 68
DRG: 854 | End: 2023-10-31
Payer: COMMERCIAL

## 2023-10-31 ENCOUNTER — ANESTHESIA EVENT (INPATIENT)
Dept: PERIOP | Facility: HOSPITAL | Age: 68
DRG: 854 | End: 2023-10-31
Payer: COMMERCIAL

## 2023-10-31 ENCOUNTER — ANESTHESIA (INPATIENT)
Dept: PERIOP | Facility: HOSPITAL | Age: 68
DRG: 854 | End: 2023-10-31
Payer: COMMERCIAL

## 2023-10-31 LAB
ANION GAP SERPL CALCULATED.3IONS-SCNC: 5 MMOL/L
BUN SERPL-MCNC: 20 MG/DL (ref 5–25)
CALCIUM SERPL-MCNC: 8.2 MG/DL (ref 8.4–10.2)
CHLORIDE SERPL-SCNC: 105 MMOL/L (ref 96–108)
CO2 SERPL-SCNC: 25 MMOL/L (ref 21–32)
CREAT SERPL-MCNC: 0.72 MG/DL (ref 0.6–1.3)
ERYTHROCYTE [DISTWIDTH] IN BLOOD BY AUTOMATED COUNT: 12.5 % (ref 11.6–15.1)
GFR SERPL CREATININE-BSD FRML MDRD: 95 ML/MIN/1.73SQ M
GLUCOSE SERPL-MCNC: 195 MG/DL (ref 65–140)
GLUCOSE SERPL-MCNC: 239 MG/DL (ref 65–140)
GLUCOSE SERPL-MCNC: 312 MG/DL (ref 65–140)
GLUCOSE SERPL-MCNC: 403 MG/DL (ref 65–140)
HCT VFR BLD AUTO: 37.4 % (ref 36.5–49.3)
HGB BLD-MCNC: 12.6 G/DL (ref 12–17)
MCH RBC QN AUTO: 28.8 PG (ref 26.8–34.3)
MCHC RBC AUTO-ENTMCNC: 33.7 G/DL (ref 31.4–37.4)
MCV RBC AUTO: 86 FL (ref 82–98)
PLATELET # BLD AUTO: 319 THOUSANDS/UL (ref 149–390)
PMV BLD AUTO: 9.4 FL (ref 8.9–12.7)
POTASSIUM SERPL-SCNC: 4.3 MMOL/L (ref 3.5–5.3)
RBC # BLD AUTO: 4.37 MILLION/UL (ref 3.88–5.62)
SODIUM SERPL-SCNC: 135 MMOL/L (ref 135–147)
WBC # BLD AUTO: 8.31 THOUSAND/UL (ref 4.31–10.16)

## 2023-10-31 PROCEDURE — 85027 COMPLETE CBC AUTOMATED: CPT | Performed by: INTERNAL MEDICINE

## 2023-10-31 PROCEDURE — 87077 CULTURE AEROBIC IDENTIFY: CPT | Performed by: STUDENT IN AN ORGANIZED HEALTH CARE EDUCATION/TRAINING PROGRAM

## 2023-10-31 PROCEDURE — 99233 SBSQ HOSP IP/OBS HIGH 50: CPT | Performed by: FAMILY MEDICINE

## 2023-10-31 PROCEDURE — 82948 REAGENT STRIP/BLOOD GLUCOSE: CPT

## 2023-10-31 PROCEDURE — 87185 SC STD ENZYME DETCJ PER NZM: CPT | Performed by: STUDENT IN AN ORGANIZED HEALTH CARE EDUCATION/TRAINING PROGRAM

## 2023-10-31 PROCEDURE — 73630 X-RAY EXAM OF FOOT: CPT

## 2023-10-31 PROCEDURE — 88305 TISSUE EXAM BY PATHOLOGIST: CPT | Performed by: PATHOLOGY

## 2023-10-31 PROCEDURE — 87205 SMEAR GRAM STAIN: CPT | Performed by: STUDENT IN AN ORGANIZED HEALTH CARE EDUCATION/TRAINING PROGRAM

## 2023-10-31 PROCEDURE — 80048 BASIC METABOLIC PNL TOTAL CA: CPT | Performed by: INTERNAL MEDICINE

## 2023-10-31 PROCEDURE — 88311 DECALCIFY TISSUE: CPT | Performed by: PATHOLOGY

## 2023-10-31 PROCEDURE — 87176 TISSUE HOMOGENIZATION CULTR: CPT | Performed by: STUDENT IN AN ORGANIZED HEALTH CARE EDUCATION/TRAINING PROGRAM

## 2023-10-31 PROCEDURE — 0Y6Q0Z0 DETACHMENT AT LEFT 1ST TOE, COMPLETE, OPEN APPROACH: ICD-10-PCS | Performed by: STUDENT IN AN ORGANIZED HEALTH CARE EDUCATION/TRAINING PROGRAM

## 2023-10-31 PROCEDURE — 87075 CULTR BACTERIA EXCEPT BLOOD: CPT | Performed by: STUDENT IN AN ORGANIZED HEALTH CARE EDUCATION/TRAINING PROGRAM

## 2023-10-31 PROCEDURE — 87070 CULTURE OTHR SPECIMN AEROBIC: CPT | Performed by: STUDENT IN AN ORGANIZED HEALTH CARE EDUCATION/TRAINING PROGRAM

## 2023-10-31 PROCEDURE — 87186 SC STD MICRODIL/AGAR DIL: CPT | Performed by: STUDENT IN AN ORGANIZED HEALTH CARE EDUCATION/TRAINING PROGRAM

## 2023-10-31 PROCEDURE — 87147 CULTURE TYPE IMMUNOLOGIC: CPT | Performed by: STUDENT IN AN ORGANIZED HEALTH CARE EDUCATION/TRAINING PROGRAM

## 2023-10-31 RX ORDER — MIDAZOLAM HYDROCHLORIDE 2 MG/2ML
INJECTION, SOLUTION INTRAMUSCULAR; INTRAVENOUS AS NEEDED
Status: DISCONTINUED | OUTPATIENT
Start: 2023-10-31 | End: 2023-10-31

## 2023-10-31 RX ORDER — SODIUM CHLORIDE, SODIUM LACTATE, POTASSIUM CHLORIDE, CALCIUM CHLORIDE 600; 310; 30; 20 MG/100ML; MG/100ML; MG/100ML; MG/100ML
INJECTION, SOLUTION INTRAVENOUS CONTINUOUS PRN
Status: DISCONTINUED | OUTPATIENT
Start: 2023-10-31 | End: 2023-10-31

## 2023-10-31 RX ORDER — MAGNESIUM HYDROXIDE 1200 MG/15ML
LIQUID ORAL AS NEEDED
Status: DISCONTINUED | OUTPATIENT
Start: 2023-10-31 | End: 2023-10-31 | Stop reason: HOSPADM

## 2023-10-31 RX ORDER — ONDANSETRON 2 MG/ML
4 INJECTION INTRAMUSCULAR; INTRAVENOUS ONCE AS NEEDED
Status: DISCONTINUED | OUTPATIENT
Start: 2023-10-31 | End: 2023-10-31 | Stop reason: HOSPADM

## 2023-10-31 RX ORDER — LIDOCAINE HYDROCHLORIDE 20 MG/ML
INJECTION, SOLUTION EPIDURAL; INFILTRATION; INTRACAUDAL; PERINEURAL AS NEEDED
Status: DISCONTINUED | OUTPATIENT
Start: 2023-10-31 | End: 2023-10-31

## 2023-10-31 RX ORDER — FENTANYL CITRATE 50 UG/ML
INJECTION, SOLUTION INTRAMUSCULAR; INTRAVENOUS AS NEEDED
Status: DISCONTINUED | OUTPATIENT
Start: 2023-10-31 | End: 2023-10-31

## 2023-10-31 RX ORDER — PROPOFOL 10 MG/ML
INJECTION, EMULSION INTRAVENOUS AS NEEDED
Status: DISCONTINUED | OUTPATIENT
Start: 2023-10-31 | End: 2023-10-31

## 2023-10-31 RX ORDER — ONDANSETRON 2 MG/ML
INJECTION INTRAMUSCULAR; INTRAVENOUS AS NEEDED
Status: DISCONTINUED | OUTPATIENT
Start: 2023-10-31 | End: 2023-10-31

## 2023-10-31 RX ORDER — INSULIN LISPRO 100 [IU]/ML
1-6 INJECTION, SOLUTION INTRAVENOUS; SUBCUTANEOUS
Status: DISCONTINUED | OUTPATIENT
Start: 2023-10-31 | End: 2023-11-02 | Stop reason: HOSPADM

## 2023-10-31 RX ORDER — PROPOFOL 10 MG/ML
INJECTION, EMULSION INTRAVENOUS CONTINUOUS PRN
Status: DISCONTINUED | OUTPATIENT
Start: 2023-10-31 | End: 2023-10-31

## 2023-10-31 RX ORDER — FENTANYL CITRATE/PF 50 MCG/ML
50 SYRINGE (ML) INJECTION
Status: DISCONTINUED | OUTPATIENT
Start: 2023-10-31 | End: 2023-10-31 | Stop reason: HOSPADM

## 2023-10-31 RX ADMIN — HEPARIN SODIUM 5000 UNITS: 5000 INJECTION INTRAVENOUS; SUBCUTANEOUS at 05:28

## 2023-10-31 RX ADMIN — ONDANSETRON 4 MG: 2 INJECTION INTRAMUSCULAR; INTRAVENOUS at 08:02

## 2023-10-31 RX ADMIN — SODIUM CHLORIDE, SODIUM LACTATE, POTASSIUM CHLORIDE, AND CALCIUM CHLORIDE: .6; .31; .03; .02 INJECTION, SOLUTION INTRAVENOUS at 07:20

## 2023-10-31 RX ADMIN — PROPOFOL 75 MCG/KG/MIN: 10 INJECTION, EMULSION INTRAVENOUS at 08:02

## 2023-10-31 RX ADMIN — DAPTOMYCIN 500 MG: 500 INJECTION, POWDER, LYOPHILIZED, FOR SOLUTION INTRAVENOUS at 19:07

## 2023-10-31 RX ADMIN — LIDOCAINE HYDROCHLORIDE 100 MG: 20 INJECTION, SOLUTION EPIDURAL; INFILTRATION; INTRACAUDAL at 08:02

## 2023-10-31 RX ADMIN — HEPARIN SODIUM 5000 UNITS: 5000 INJECTION INTRAVENOUS; SUBCUTANEOUS at 22:27

## 2023-10-31 RX ADMIN — INSULIN LISPRO 5 UNITS: 100 INJECTION, SOLUTION INTRAVENOUS; SUBCUTANEOUS at 17:55

## 2023-10-31 RX ADMIN — PROPOFOL 30 MG: 10 INJECTION, EMULSION INTRAVENOUS at 08:02

## 2023-10-31 RX ADMIN — FENTANYL CITRATE 25 MCG: 50 INJECTION INTRAMUSCULAR; INTRAVENOUS at 08:41

## 2023-10-31 RX ADMIN — FENTANYL CITRATE 25 MCG: 50 INJECTION INTRAMUSCULAR; INTRAVENOUS at 08:13

## 2023-10-31 RX ADMIN — INSULIN GLARGINE 35 UNITS: 100 INJECTION, SOLUTION SUBCUTANEOUS at 22:27

## 2023-10-31 RX ADMIN — MIDAZOLAM 2 MG: 1 INJECTION INTRAMUSCULAR; INTRAVENOUS at 07:58

## 2023-10-31 RX ADMIN — INSULIN LISPRO 6 UNITS: 100 INJECTION, SOLUTION INTRAVENOUS; SUBCUTANEOUS at 22:17

## 2023-10-31 RX ADMIN — FENTANYL CITRATE 25 MCG: 50 INJECTION INTRAMUSCULAR; INTRAVENOUS at 08:02

## 2023-10-31 RX ADMIN — FENTANYL CITRATE 25 MCG: 50 INJECTION INTRAMUSCULAR; INTRAVENOUS at 08:23

## 2023-10-31 NOTE — CASE MANAGEMENT
Case Management Discharge Planning Note    Patient name Solange Vyas  Location [address] [facility] 307/-79 MRN [id number]  : 1955 Date 10/31/2023       Current Admission Date: 10/25/2023  Current Admission Diagnosis:Diabetic ulcer of left great toe [facility])   Patient Active Problem List    Diagnosis Date Noted    Osteomyelitis ([address]) 10/29/2023    Diabetic ulcer of left great toe ([address]) 10/25/2023    Abdominal pain 2016    Diabetes mellitus ([address])       LOS (days): 6  Geometric Mean LOS (GMLOS) (days):   Days to GMLOS:     OBJECTIVE:  Risk of Unplanned Readmission Score: 10.19         Current admission status: Inpatient   Preferred Pharmacy:   [address] [place] [id number]  Phone: [phone] Fax: [address] [place] [address]  Phone: [phone] Fax: [phone]    Primary Care Provider: Riana Romero MD    Primary Insurance: [facility]  Secondary Insurance:     DISCHARGE DETAILS:                                          Other Referral/Resources/Interventions Provided:  Interventions: [facility]  Referral Comments: [facility] referral in aidin completed for wound care

## 2023-10-31 NOTE — PLAN OF CARE
Problem: Potential for Falls  Goal: Patient will remain free of falls  Description: INTERVENTIONS:  - Educate patient/family on patient safety including physical limitations  - Instruct patient to call for assistance with activity   - Consult OT/PT to assist with strengthening/mobility   - Keep Call bell within reach  - Keep bed low and locked with side rails adjusted as appropriate  - Keep care items and personal belongings within reach  - Initiate and maintain comfort rounds  - Make Fall Risk Sign visible to staff  - Apply yellow socks and bracelet for high fall risk patients  - Consider moving patient to room near nurses station  Outcome: Completed     Problem: MOBILITY - ADULT  Goal: Maintain or return to baseline ADL function  Description: INTERVENTIONS:  - Educate patient/family on patient safety including physical limitations  - Instruct patient to call for assistance with activity   - Consult OT/PT to assist with strengthening/mobility   - Keep Call bell within reach  - Keep bed low and locked with side rails adjusted as appropriate  - Keep care items and personal belongings within reach  - Initiate and maintain comfort rounds  - Make Fall Risk Sign visible to staff  - Apply yellow socks and bracelet for high fall risk patients  - Consider moving patient to room near nurses station  Outcome: Completed  Goal: Maintains/Returns to pre admission functional level  Description: INTERVENTIONS:  -  Assess patient's ability to carry out ADLs; assess patient's baseline for ADL function and identify physical deficits which impact ability to perform ADLs (bathing, care of mouth/teeth, toileting, grooming, dressing, etc.)  - Assess/evaluate cause of self-care deficits   - Assess range of motion  - Assess patient's mobility; develop plan if impaired  - Assess patient's need for assistive devices and provide as appropriate  - Encourage maximum independence but intervene and supervise when necessary  - Involve family in performance of ADLs  - Assess for home care needs following discharge   - Consider OT consult to assist with ADL evaluation and planning for discharge  - Provide patient education as appropriate  Outcome: Completed     Problem: PAIN - ADULT  Goal: Verbalizes/displays adequate comfort level or baseline comfort level  Description: Interventions:  - Encourage patient to monitor pain and request assistance  - Assess pain using appropriate pain scale  - Administer analgesics based on type and severity of pain and evaluate response  - Implement non-pharmacological measures as appropriate and evaluate response  - Consider cultural and social influences on pain and pain management  - Notify physician/advanced practitioner if interventions unsuccessful or patient reports new pain  Outcome: Completed     Problem: INFECTION - ADULT  Goal: Absence or prevention of progression during hospitalization  Description: INTERVENTIONS:  - Assess and monitor for signs and symptoms of infection  - Monitor lab/diagnostic results  - Monitor all insertion sites, i.e. indwelling lines, tubes, and drains  - Monitor endotracheal if appropriate and nasal secretions for changes in amount and color  - [place] appropriate cooling/warming therapies per order  - Administer medications as ordered  - Instruct and encourage patient and family to use good hand hygiene technique  - Identify and instruct in appropriate isolation precautions for identified infection/condition  Outcome: Completed  Goal: Absence of fever/infection during neutropenic period  Description: INTERVENTIONS:  - Monitor WBC    Outcome: Completed     Problem: SAFETY ADULT  Goal: Patient will remain free of falls  Description: INTERVENTIONS:  - Educate patient/family on patient safety including physical limitations  - Instruct patient to call for assistance with activity   - Consult OT/PT to assist with strengthening/mobility   - Keep Call bell within reach  - Keep bed low and locked with side rails adjusted as appropriate  - Keep care items and personal belongings within reach  - Initiate and maintain comfort rounds  - Make Fall Risk Sign visible to staff  - Apply yellow socks and bracelet for high fall risk patients  - Consider moving patient to room near nurses station  Outcome: Completed  Goal: Maintain or return to baseline ADL function  Description: INTERVENTIONS:  - Educate patient/family on patient safety including physical limitations  - Instruct patient to call for assistance with activity   - Consult OT/PT to assist with strengthening/mobility   - Keep Call bell within reach  - Keep bed low and locked with side rails adjusted as appropriate  - Keep care items and personal belongings within reach  - Initiate and maintain comfort rounds  - Make Fall Risk Sign visible to staff  - Apply yellow socks and bracelet for high fall risk patients  - Consider moving patient to room near nurses station  Outcome: Completed  Goal: Maintains/Returns to pre admission functional level  Description: INTERVENTIONS:  -  Assess patient's ability to carry out ADLs; assess patient's baseline for ADL function and identify physical deficits which impact ability to perform ADLs (bathing, care of mouth/teeth, toileting, grooming, dressing, etc.)  - Assess/evaluate cause of self-care deficits   - Assess range of motion  - Assess patient's mobility; develop plan if impaired  - Assess patient's need for assistive devices and provide as appropriate  - Encourage maximum independence but intervene and supervise when necessary  - Involve family in performance of ADLs  - Assess for home care needs following discharge   - Consider OT consult to assist with ADL evaluation and planning for discharge  - Provide patient education as appropriate  Outcome: Completed     Problem: DISCHARGE PLANNING  Goal: Discharge to home or other facility with appropriate resources  Description: INTERVENTIONS:  - Identify barriers to discharge w/patient and caregiver  - Arrange for needed discharge resources and transportation as appropriate  - Identify discharge learning needs (meds, wound care, etc.)  - Arrange for interpretive services to assist at discharge as needed  - Refer to Case Management Department for coordinating discharge planning if the patient needs post-hospital services based on physician/advanced practitioner order or complex needs related to functional status, cognitive ability, or social support system  Outcome: Completed     Problem: Knowledge Deficit  Goal: Patient/family/caregiver demonstrates understanding of disease process, treatment plan, medications, and discharge instructions  Description: Complete learning assessment and assess knowledge base.   Interventions:  - Provide teaching at level of understanding  - Provide teaching via preferred learning methods  Outcome: Completed     Problem: SKIN/TISSUE INTEGRITY - ADULT  Goal: Skin Integrity remains intact(Skin Breakdown Prevention)  Description: Assess:  -Assess extremities for adequate circulation and sensation     Bed Management:  -Have minimal linens on bed & keep smooth, unwrinkled  -Change linens as needed when moist or perspiring  while in bed    Toileting:  -Offer bedside commode    Activity:  -Encourage activity and walks on unit  -Encourage or provide ROM exercises   -Use appropriate equipment to lift or move patient in bed      Skin Care:  -Avoid use of baby powder, tape, friction and shearing, hot water or constrictive clothing  -Do not massage red bony areas  Outcome: Completed  Goal: Incision(s), wounds(s) or drain site(s) healing without S/S of infection  Description: INTERVENTIONS  - Assess and document dressing, incision, wound bed, drain sites and surrounding tissue  - Provide patient and family education  Outcome: Completed  Goal: Pressure injury heals and does not worsen  Description: Interventions:  - Implement low air loss mattress or specialty surface (Criteria met)  - Apply silicone foam dressing  - Consider nutrition services referral as needed  Outcome: Completed     Problem: Nutrition/Hydration-ADULT  Goal: Nutrient/Hydration intake appropriate for improving, restoring or maintaining nutritional needs  Description: Monitor and assess patient's nutrition/hydration status for malnutrition. Collaborate with interdisciplinary team and initiate plan and interventions as ordered. Monitor patient's weight and dietary intake as ordered or per policy. Utilize nutrition screening tool and intervene as necessary. Determine patient's food preferences and provide high-protein, high-caloric foods as appropriate.      INTERVENTIONS:  - Monitor oral intake, urinary output, labs, and treatment plans  - Assess nutrition and hydration status and recommend course of action  - Evaluate amount of meals eaten  - Assist patient with eating if necessary   - Allow adequate time for meals  - Recommend/ encourage appropriate diets, oral nutritional supplements, and vitamin/mineral supplements  - Order, calculate, and assess calorie counts as needed  - Recommend, monitor, and adjust tube feedings and TPN/PPN based on assessed needs  - Assess need for intravenous fluids  - Provide specific nutrition/hydration education as appropriate  - Include patient/family/caregiver in decisions related to nutrition  Outcome: Completed     Problem: Prexisting or High Potential for Compromised Skin Integrity  Goal: Skin integrity is maintained or improved  Description: INTERVENTIONS:  - Identify patients at risk for skin breakdown  - Assess and monitor skin integrity  - Assess and monitor nutrition and hydration status  - Monitor labs   - Assess for incontinence   - Turn and reposition patient  - Assist with mobility/ambulation  - Relieve pressure over bony prominences  - Avoid friction and shearing  - Provide appropriate hygiene as needed including keeping skin clean and dry  - Evaluate need for skin moisturizer/barrier cream  - Collaborate with interdisciplinary team   - Patient/family teaching  - Consider wound care consult   Outcome: Completed

## 2023-10-31 NOTE — OP NOTE
OPERATIVE REPORT  PATIENT NAME: Broyd Stewart    :  1955  MRN: [id number]  Pt Location: [facility] OR ROOM 02    SURGERY DATE: 10/31/2023    Surgeon(s) and Role:     * Shawn Brasher DPM - Primary    Preop Diagnosis:  Diabetic ulcer of left great toe ([address]) [Z85.764, L97.529]  Osteomyelitis, left foot [M86.172]  Left hallux abscess [L03.032]    Post-op Diagnosis:  Same    Procedure(s):  (1) Left hallux amputation at MTPJ  (2) Left hallux abscess incision & drainage     Specimen(s):  ID Type Source Tests Collected by Time Destination   1 : Left hallux Tissue Foot, Left TISSUE EXAM Shawn Brasher DPM 10/31/2023 08    A : Left hallux wound Tissue Foot, Left ANAEROBIC CULTURE AND GRAM STAIN, CULTURE, TISSUE AND GRAM STAIN Shawn Brasher DPM 10/31/2023 0818    B : left hallux Tissue Foot, Left ANAEROBIC CULTURE AND GRAM STAIN, CULTURE, TISSUE AND GRAM STAIN Shawn Brasher DPM 10/31/2023 08        Estimated Blood Loss:   5 mL    Anesthesia Type:   IV Sedation with Anesthesia    Operative Indications:  Diabetic ulcer of left great toe (HCC) [I19.280, L97.529]  Left hallux osteomyelitis    Operative Findings:  Malodor & osseous destruction at the left hallux IPJ    Complications:   None    Procedure and Technique:  Patient was brought to the operating room and left on the stretcher. A local infiltrative block consisting of 20 cc of 1.0% lidocaine and 0.5% Marcaine mix was administered to the patient's right foot. The right foot was then prepped and draped in the usual aseptic fashion. Attention was directed to the left hallux digit where there was a wound to the plantar medial aspect of the interphalangeal joint of the toe. This wound probed directly to bone. X-ray & MRI showed osseous erosion. No concern for osteomyelitis at the first metatarsal head though. Using a #15 blade an incision was made over the left hallux digit and a racquet shaped incision. The incision was brought down directly to bone. The proximal phalanx was disarticulated from the head of the first metatarsal.  The digit was removed from the surgical field. This was sent to pathology. A culture swab was also sent after swabbing the digit and bone to microbiology. The left first metatarsal head that remained was white and hard and healthy. The area was copiously irrigated using sterile saline. The incision was closed using 2-0 Vicryl for deep closure and 3-0 nylon for skin closure. The area was dressed with Betadine soaked Adaptic, 4 x 4's, Kerlix, ACE wrap. Patient tolerated the above procedure well and was discharged to the PACU. Patient can be weight bearing as tolerated in a post op shoe. I was present for the entire procedure.     Patient Disposition:  PACU         SIGNATURE: Tsering Bran DPM  DATE: October 31, 2023  TIME: 8:47 AM

## 2023-10-31 NOTE — ANESTHESIA PREPROCEDURE EVALUATION
Procedure:  AMPUTATION TOE (Left: Toe)    Relevant Problems   Endocrine   (+) Diabetes mellitus (HCC)   (+) Diabetic ulcer of left great toe (HCC)      Other   (+) Osteomyelitis (HCC)        Physical Exam    Airway    Mallampati score: II  TM Distance: <3 FB  Neck ROM: full     Dental   Comment: Eroded lower dentition     Cardiovascular      Pulmonary      Other Findings        Anesthesia Plan  ASA Score- 3     Anesthesia Type- IV sedation with anesthesia with ASA Monitors. Additional Monitors:     Airway Plan:     Comment: I discussed the risks and benefits of IV sedation anesthesia including the possibility of the need to convert to general anesthesia and the potential risk of awareness. Plan Factors-Exercise tolerance (METS): >4 METS. Exercise comment: Able to climb flight of stairs without cardiopulmonary limitation. Chart reviewed. EKG reviewed. Existing labs reviewed. Patient is not a current smoker. Patient did not smoke on day of surgery. Induction- intravenous. Postoperative Plan-     Informed Consent- Anesthetic plan and risks discussed with patient.

## 2023-10-31 NOTE — ANESTHESIA POSTPROCEDURE EVALUATION
Post-Op Assessment Note    CV Status:  Stable  Pain Score: 0    Pain management: adequate     Mental Status:  Alert and awake   Hydration Status:  Euvolemic   PONV Controlled:  Controlled   Airway Patency:  Patent      Post Op Vitals Reviewed: Yes      Staff: Anesthesiologist, CRNA         No notable events documented.     /66 (10/31/23 0848)    Temp 97.8 °F (36.6 °C) (10/31/23 0848)    Pulse 72 (10/31/23 0848)   Resp 12 (10/31/23 0848)    SpO2 98 % (10/31/23 0848)

## 2023-10-31 NOTE — PROGRESS NOTES
[address]  Progress Note  Name: Maxim Ruby  MRN: [id number]  Unit/Bed#: -01 I Date of Admission: 10/25/2023   Date of Service: 10/31/2023 I Hospital Day: 6    Assessment/Plan   * Diabetic ulcer of left great toe [facility])  Assessment & Plan    Postop day 0 status post left hallux amputation at metatarsophalangeal joint and left hallux abscess incision and drainage  Nonhealing ulceration of the left great toe, exacerbated by poorly controlled diabetes as evidenced by hemoglobin A1c of 14.7% as an outpatient  X-ray of the left great toe concerning for osteomyelitis at the distal aspect of the toe. Reviewed MRI: Osteomyelitis identified in the distal proximal and distal phalanxes  Wound cultures grew MRSA; based on JOSE patient has been placed on daptomycin. Would plan to continue antibiotics 3 days post amputation, assuming surgical care obtained. Bilateral ABIs WNL          Osteomyelitis (HCC)  Assessment & Plan  S/p amputation with podiatry  Will discuss with podiatry if this was a surgical cure    Diabetes mellitus ([address])  Assessment & Plan  Blood glucose very poorly controlled with hemoglobin A1c of 14.7%. Last A1c on record was 4 years ago.    today in AM    cont Lantus to 35 units nightly  cont mealtime Humalog to 5 units 3 times daily  Cont ISS for correctional coverage  Hypoglycemia protocol  Consistent carb diet  Continue to monitor and titrate as necessary        Vertigo-resolved as of 10/30/2023  Assessment & Plan  Dents with vertiginous symptoms  No other focal symptoms to suggest CVA, and no nystagmus, CTA of the head/neck negative  Symptoms were multifactorial in the setting of sepsis, significant hyperglycemia, and electrolyte derangements  His dizziness has now resolved with treatment as above    Pseudohyponatremia-resolved as of 10/30/2023  Assessment & Plan  Improving with better BG control  monitor daily BMP         Wound care recommendations: Weightbearing as tolerated in postop shoe. Dressing changes every other day with Betadine paint, Adaptic, dry sterile dressings    VTE Pharmacologic Prophylaxis:    heparin    Patient Centered Rounds: I performed bedside rounds with nursing staff today. Discussions with Specialists or Other Care Team Provider: yes podiatry    Education and Discussions with Family / Patient: Updated  (wife) at bedside. Total Time Spent on Date of Encounter in care of patient: 21 mins. This time was spent on one or more of the following: performing physical exam; counseling and coordination of care; obtaining or reviewing history; documenting in the medical record; reviewing/ordering tests, medications or procedures; communicating with other healthcare professionals and discussing with patient's family/caregivers. Current Length of Stay: 6 day(s)  Current Patient Status: Inpatient   Certification Statement: The patient will continue to require additional inpatient hospital stay due to wound check tomorrow and PT OT consult  Discharge Plan: Anticipate discharge in 24-48 hrs to home with home services. Code Status: Level 3 - DNAR and DNI    Subjective:   Seen and examined at bedside  No pain  No cp or sob     Objective:     Vitals:   Temp (24hrs), Av °F (36.7 °C), Min:97.7 °F (36.5 °C), Max:98.3 °F (36.8 °C)    Temp:  [97.7 °F (36.5 °C)-98.3 °F (36.8 °C)] 97.7 °F (36.5 °C)  HR:  [64-89] 68  Resp:  [12-18] 17  BP: (126-147)/(66-79) 137/71  SpO2:  [95 %-98 %] 95 %  Body mass index is 23.03 kg/m². Input and Output Summary (last 24 hours):      Intake/Output Summary (Last 24 hours) at 10/31/2023 1011  Last data filed at 10/31/2023 0838  Gross per 24 hour   Intake 1900 ml   Output 1205 ml   Net 695 ml       Physical Exam:   Physical Exam General- Awake, alert and oriented x 3, looks comfortable  HEENT- Normocephalic, atraumatic, oral mucosa- moist  Neck- Supple, No carotid bruit, no JVD  CVS- Normal S1/ S2, Regular rate and rhythm, No murmur, No edema  Respiratory system- B/L clear breath sounds, no wheezing  Abdomen- Soft, Non distended, no tenderness, Bowel sound- present 4 quads  Genitourinary- No suprapubic tenderness, No CVA tenderness  Skin- No new bruise or rash  Musculoskeletal-surgical site under dressing  Psych- No acute psychosis  CNS- CN II- XII grossly intact, No acute focal neurologic deficit noted      Additional Data:     Labs:  Results from last 7 days   Lab Units 10/31/23  0503 10/27/23  0620 10/26/23  0451   WBC Thousand/uL 8.31   < > 11.81*   HEMOGLOBIN g/dL 12.6   < > 12.2   HEMATOCRIT % 37.4   < > 35.9*   PLATELETS Thousands/uL 319   < > 271   NEUTROS PCT %  --   --  74   LYMPHS PCT %  --   --  13*   MONOS PCT %  --   --  11   EOS PCT %  --   --  1    < > = values in this interval not displayed. Results from last 7 days   Lab Units 10/31/23  0503 10/26/23  0451 10/25/23  2030   SODIUM mmol/L 135   < > 128*   POTASSIUM mmol/L 4.3   < > 4.2   CHLORIDE mmol/L 105   < > 95*   CO2 mmol/L 25   < > 21   BUN mg/dL 20   < > 25   CREATININE mg/dL 0.72   < > 1.10   ANION GAP mmol/L 5   < > 12   CALCIUM mg/dL 8.2*   < > 8.4   ALBUMIN g/dL  --   --  3.2*   TOTAL BILIRUBIN mg/dL  --   --  0.63   ALK PHOS U/L  --   --  98   ALT U/L  --   --  11   AST U/L  --   --  13   GLUCOSE RANDOM mg/dL 239*   < > 309*    < > = values in this interval not displayed.      Results from last 7 days   Lab Units 10/25/23  2030   INR  1.08     Results from last 7 days   Lab Units 10/30/23  2103 10/30/23  1539 10/30/23  1108 10/30/23  0743 10/29/23  2044 10/29/23  1509 10/29/23  1108 10/29/23  0739 10/28/23  2109 10/28/23  1554 10/28/23  1240 10/28/23  0755   POC GLUCOSE mg/dl 285* 230* 235* 132 227* 342* 241* 251* 298* 255* 205* 177*     Results from last 7 days   Lab Units 10/26/23  0128   HEMOGLOBIN A1C % 14.7*     Results from last 7 days   Lab Units 10/25/23  2030   LACTIC ACID mmol/L 1.0   PROCALCITONIN ng/ml 1.11* Lines/Drains:  Invasive Devices       Peripheral Intravenous Line  Duration             Peripheral IV 10/29/23 Left Wrist 2 days                          Imaging: No pertinent imaging reviewed. Recent Cultures (last 7 days):   Results from last 7 days   Lab Units 10/27/23  1300 10/25/23  2033 10/25/23  2030   BLOOD CULTURE   --  No Growth After 5 Days. No Growth After 5 Days. GRAM STAIN RESULT  Rare Polys*  3+ Gram negative rods*  2+ Gram positive cocci in pairs*  --   --    WOUND CULTURE  2+ Growth of Methicillin Resistant Staphylococcus aureus*  1+ Growth of Beta Hemolytic Streptococcus Group G*  2+ Growth of  --   --        Last 24 Hours Medication List:   Current Facility-Administered Medications   Medication Dose Route Frequency Provider Last Rate    [MAR Hold] acetaminophen  650 mg Oral Q6H PRN Jaren Diaz MD      [facility] Hold] DAPTOmycin  6 mg/kg Intravenous Q24H Renato Bboo  mg (10/30/23 1315)    [MAR Hold] heparin (porcine)  5,000 Units Subcutaneous [facility] Jaren Diaz MD      [MAR Hold] insulin glargine  35 Units Subcutaneous HS Renato Bobo DO      [facility] lispro  1-6 Units Subcutaneous TID Avery Henley MD      [facility] Hold] insulin lispro  5 Units Subcutaneous TID With Meals Nahun Graves DO      [MAR Hold] traMADol  50 mg Oral Q6H PRN Jaren Diaz MD          Today, Patient Was Seen By: Tamika Sanchez MD    **Please Note: This note may have been constructed using a voice recognition system. **

## 2023-11-01 LAB
GLUCOSE SERPL-MCNC: 233 MG/DL (ref 65–140)
GLUCOSE SERPL-MCNC: 244 MG/DL (ref 65–140)
GLUCOSE SERPL-MCNC: 274 MG/DL (ref 65–140)
GLUCOSE SERPL-MCNC: 347 MG/DL (ref 65–140)

## 2023-11-01 PROCEDURE — 99233 SBSQ HOSP IP/OBS HIGH 50: CPT | Performed by: FAMILY MEDICINE

## 2023-11-01 PROCEDURE — 97167 OT EVAL HIGH COMPLEX 60 MIN: CPT

## 2023-11-01 PROCEDURE — 97163 PT EVAL HIGH COMPLEX 45 MIN: CPT

## 2023-11-01 PROCEDURE — 82948 REAGENT STRIP/BLOOD GLUCOSE: CPT

## 2023-11-01 RX ORDER — INSULIN GLARGINE 100 [IU]/ML
38 INJECTION, SOLUTION SUBCUTANEOUS
Status: DISCONTINUED | OUTPATIENT
Start: 2023-11-01 | End: 2023-11-02 | Stop reason: HOSPADM

## 2023-11-01 RX ORDER — SULFAMETHOXAZOLE AND TRIMETHOPRIM 800; 160 MG/1; MG/1
1 TABLET ORAL EVERY 12 HOURS SCHEDULED
Status: DISCONTINUED | OUTPATIENT
Start: 2023-11-01 | End: 2023-11-02 | Stop reason: HOSPADM

## 2023-11-01 RX ORDER — INSULIN LISPRO 100 [IU]/ML
8 INJECTION, SOLUTION INTRAVENOUS; SUBCUTANEOUS
Status: DISCONTINUED | OUTPATIENT
Start: 2023-11-01 | End: 2023-11-02 | Stop reason: HOSPADM

## 2023-11-01 RX ORDER — ONDANSETRON 2 MG/ML
4 INJECTION INTRAMUSCULAR; INTRAVENOUS EVERY 6 HOURS PRN
Status: DISCONTINUED | OUTPATIENT
Start: 2023-11-01 | End: 2023-11-02 | Stop reason: HOSPADM

## 2023-11-01 RX ADMIN — INSULIN LISPRO 5 UNITS: 100 INJECTION, SOLUTION INTRAVENOUS; SUBCUTANEOUS at 12:46

## 2023-11-01 RX ADMIN — INSULIN LISPRO 4 UNITS: 100 INJECTION, SOLUTION INTRAVENOUS; SUBCUTANEOUS at 21:17

## 2023-11-01 RX ADMIN — INSULIN LISPRO 5 UNITS: 100 INJECTION, SOLUTION INTRAVENOUS; SUBCUTANEOUS at 08:22

## 2023-11-01 RX ADMIN — INSULIN LISPRO 3 UNITS: 100 INJECTION, SOLUTION INTRAVENOUS; SUBCUTANEOUS at 12:46

## 2023-11-01 RX ADMIN — HEPARIN SODIUM 5000 UNITS: 5000 INJECTION INTRAVENOUS; SUBCUTANEOUS at 21:17

## 2023-11-01 RX ADMIN — INSULIN LISPRO 5 UNITS: 100 INJECTION, SOLUTION INTRAVENOUS; SUBCUTANEOUS at 16:47

## 2023-11-01 RX ADMIN — INSULIN LISPRO 3 UNITS: 100 INJECTION, SOLUTION INTRAVENOUS; SUBCUTANEOUS at 08:22

## 2023-11-01 RX ADMIN — HEPARIN SODIUM 5000 UNITS: 5000 INJECTION INTRAVENOUS; SUBCUTANEOUS at 05:41

## 2023-11-01 RX ADMIN — INSULIN GLARGINE 38 UNITS: 100 INJECTION, SOLUTION SUBCUTANEOUS at 21:17

## 2023-11-01 RX ADMIN — ONDANSETRON 4 MG: 2 INJECTION INTRAMUSCULAR; INTRAVENOUS at 23:49

## 2023-11-01 RX ADMIN — SULFAMETHOXAZOLE AND TRIMETHOPRIM 1 TABLET: 800; 160 TABLET ORAL at 21:17

## 2023-11-01 RX ADMIN — HEPARIN SODIUM 5000 UNITS: 5000 INJECTION INTRAVENOUS; SUBCUTANEOUS at 14:20

## 2023-11-01 RX ADMIN — SULFAMETHOXAZOLE AND TRIMETHOPRIM 1 TABLET: 800; 160 TABLET ORAL at 14:20

## 2023-11-01 RX ADMIN — INSULIN LISPRO 8 UNITS: 100 INJECTION, SOLUTION INTRAVENOUS; SUBCUTANEOUS at 18:24

## 2023-11-01 NOTE — PLAN OF CARE
Problem: PHYSICAL THERAPY ADULT  Goal: Performs mobility at highest level of function for planned discharge setting. See evaluation for individualized goals. Description: Treatment/Interventions: Functional transfer training, LE strengthening/ROM, Therapeutic exercise, Endurance training, Patient/family training, Equipment eval/education, Bed mobility, Gait training, Spoke to nursing, Elevations, OT  Equipment Recommended: Serenity Infante (DARIELA)       See flowsheet documentation for full assessment, interventions and recommendations. 11/1/2023 1137 by Yamini Emerson PT  Note: Prognosis: Good  Problem List: Decreased strength, Decreased endurance, Impaired balance, Decreased mobility, Impaired sensation, Pain, Decreased skin integrity (podiatry restrictions)  Assessment: Pt is 76 y.o. male seen for PT evaluation on 11/1/2023 s/p admit to [address] [place] on 10/25/2023 w/ Diabetic ulcer of left great toe ([address]). Pt POD 1 s/p Left hallux amputation. PT was consulted to assess pt's functional mobility and d/c needs. Order placed for PT eval and tx. PTA, pt resides with spouse in [facility] with 1st floor setup, 12 MARTH[address], ambulates without AD. At time of eval, pt performing transfers and household distance gait trial CGA with use of RW. Upon evaluation, pt presenting with impaired functional mobility d/t decreased strength, decreased endurance, impaired balance, decreased mobility, impaired sensation, decreased skin integrity, pain, and activity intolerance. Pertinent PMHx and current co-morbidities affecting pt's physical performance at time of assessment include: diabetic ulcer of L great toe, DM, osteomyelitis, abdominal pain. Personal factors affecting pt at time of eval include: ambulating w/ assistive device, stairs to enter home, inability to navigate community distances, and positive fall history.  The following objective measures performed on IE also reveal limitations: Barthel Index: 55/100, Modified Gates: 4 (moderate/severe disability), and -PAC 6-Clicks: 09/87. Pt's clinical presentation is currently unstable/unpredictable seen in pt's presentation of abnormal lab value(s), L foot pain impacting overall mobility status, and ongoing medical assessment. Overall, pt's rehab potential and prognosis to return to PLOF is good as impacted by objective findings, warranting pt to receive further skilled PT interventions to address identified impairments, activity limitation(s), and participation restriction(s). Pt to benefit from continued PT tx to address deficits as defined above and maximize level of functional independent mobility. From PT/mobility standpoint, recommend level 2, moderate resource intensity in order to facilitate return to PLOF. Barriers to Discharge: Inaccessible home environment, Decreased caregiver support     Rehab Resource Intensity Level, PT: II (Moderate Resource Intensity)    See flowsheet documentation for full assessment. 11/1/2023 1137 by June Wilkerson PT  Note: Prognosis: Good  Problem List: Decreased strength, Decreased endurance, Impaired balance, Decreased mobility, Impaired sensation, Pain, Decreased skin integrity (podiatry restrictions)  Assessment: Pt is 76 y.o. male seen for PT evaluation on 11/1/2023 s/p admit to [id number] [place] on 10/25/2023 w/ Diabetic ulcer of left great toe ([address]). Pt POD 1 s/p Left hallux amputation. PT was consulted to assess pt's functional mobility and d/c needs. Order placed for PT eval and tx. PTA, pt resides with spouse in [facility] with 1st floor setup, 12 [address]THA, ambulates without AD. At time of eval, pt performing transfers and household distance gait trial CGA with use of RW. Upon evaluation, pt presenting with impaired functional mobility d/t decreased strength, decreased endurance, impaired balance, decreased mobility, impaired sensation, decreased skin integrity, pain, and activity intolerance.  Pertinent PMHx and current co-morbidities affecting pt's physical performance at time of assessment include: diabetic ulcer of L great toe, DM, osteomyelitis, abdominal pain. Personal factors affecting pt at time of eval include: ambulating w/ assistive device, stairs to enter home, inability to navigate community distances, and positive fall history. The following objective measures performed on IE also reveal limitations: Barthel Index: 55/100, Modified Raulito: 4 (moderate/severe disability), and AM-PAC 6-Clicks: 68/70. Pt's clinical presentation is currently unstable/unpredictable seen in pt's presentation of abnormal lab value(s), L foot pain impacting overall mobility status, and ongoing medical assessment. Overall, pt's rehab potential and prognosis to return to PLOF is good as impacted by objective findings, warranting pt to receive further skilled PT interventions to address identified impairments, activity limitation(s), and participation restriction(s). Pt to benefit from continued PT tx to address deficits as defined above and maximize level of functional independent mobility. From PT/mobility standpoint, recommend level 2, moderate resource intensity in order to facilitate return to PLOF. Barriers to Discharge: Inaccessible home environment, Decreased caregiver support     Rehab Resource Intensity Level, PT: II (Moderate Resource Intensity)    See flowsheet documentation for full assessment.

## 2023-11-01 NOTE — OCCUPATIONAL THERAPY NOTE
Occupational Therapy Evaluation      [facility] [place]    11/1/2023    Patient Active Problem List   Diagnosis    Diabetes mellitus ([address])    Abdominal pain    Diabetic ulcer of left great toe ([address])    Osteomyelitis ([address])       Past Medical History:   Diagnosis Date    Diabetes mellitus ([address])     Prostate cancer [facility])        Past Surgical History:   Procedure Laterality Date    PROSTATE SURGERY      TOE AMPUTATION Left 10/31/2023    Procedure: AMPUTATION OF LEFT HALLUX;  Surgeon: Vicky Rodriguez DPM;  Location: [facility];  Service: Podiatry        11/01/23 0727   OT Last Visit   OT Visit Date 11/01/23   Note Type   Note type Evaluation   Additional Comments Pt agreeable to OT eval. Upon arrival pt seated in recliner. Pain Assessment   Pain Assessment Tool 0-10   Pain Score 4   Pain Location/Orientation Orientation: Left; Location: Foot   Pain Onset/Description Onset: Ongoing; Descriptor: [address] Pain Intervention(s) Ambulation/increased activity;Repositioned;Medication (See MAR)   Restrictions/Precautions   Weight Bearing Precautions Per Order Yes   LLE Weight Bearing Per Order WBAT  ("weight bearing as tolerated in a post op shoe" per poditary op note on 10/31/23)   Braces or Orthoses   (Surgical shoe donned prior to mobility)   Other Precautions Multiple lines;WBS;Fall Risk;Pain   Home Living   Type of [address] [place] [address] Dr Multi-level;Stairs to enter with rails  ([address]; 1st floor set-up)   Bathroom Shower/Tub Walk-in shower   Bathroom Toilet Standard   Bathroom Equipment Grab bars in shower; Shower chair   [address]   (no AD used at baseline)   Prior Function   Level of [place] Independent with ADLs; Independent with functional mobility; Needs assistance with IADLS   Lives With Spouse; Family  (& grandson)   Receives Help From Family   IADLs Independent with driving;Family/Friend/Other provides meals; Independent with medication management   Falls in the last 6 months 1 to 4  (1 fall)   Vocational Retired   Lifestyle   Autonomy Patient reporting being independent with ADLs/IADLs, ambulatory with no AD, and lives with family   Reciprocal Relationships Wife and grandson   Service to Others Retired   ADL   Eating Assistance 6  Modified independent   [address] 6  Modified Independent   [address] 5  [address] [place] [address]  [address] [facility] 5  [address] [facility] 4  [address] [facility]  5  Supervision/Setup   Additional Comments ADL levels based on functional performance during OT eval   Bed Mobility   Additional Comments DNT bed mobility: pt seated OOB in recliner upon arrival and at end of session   Transfers   Sit to Stand   (CGA)   Additional items Assist x 1; Armrests; Increased time required;Verbal cues   Stand to Sit   (CGA)   Additional items Assist x 1; Armrests; Increased time required;Verbal cues   Functional Mobility   Functional Mobility   (CGA)   Additional Comments Pt ambulated in room with no overt SOB. Pt unsteady and limited by pain. Pt utilized RW for off-loading weight with mobility but reports that he does not want to use RW upon d/c. Additional items Rolling walker   Balance   Static Sitting Good   Dynamic Sitting Fair +   Static Standing Fair   Dynamic Standing Fair -   Activity Tolerance   Activity Tolerance Patient limited by pain   Medical Staff Made Aware Pt seen as a co-eval with PT due to the patient's co-morbidities and clinically unstable presentation indicated by chart review. RUE Assessment   RUE Assessment WFL  (grossly 4/5 MMT)   LUE Assessment   LUE Assessment WFL  (grossly 4/5 MMT)   Hand Function   Gross Motor Coordination Functional   Fine Motor Coordination Functional   Sensation   Light Touch Severe deficits in the RUE;Severe deficits in the LUE;Severe deficits in the RLE; Severe deficits in the LLE  (neuropathy in hands and feet)   Vision-Basic Assessment   Current Vision Wears glasses all the time   Cognition   Overall Cognitive Status   (Questionable)   Arousal/Participation Alert; Responsive; Cooperative   Attention Within functional limits   Orientation Level Oriented X4   Memory Decreased short term memory   Following Commands Follows all commands and directions without difficulty   Comments Mini-Cog assessment performed today. Version 1 was given. Patient able to recall 0/3 words. Patient able to draw a normal clock with the correct time. Total score of test was 2/5 indicating  further screening of cognition is recommended. Limited insight noted throughout session. Pt may benefit from further cog assessment to assist c safe d/c. Assessment   Limitation Decreased ADL status; Decreased UE strength;Decreased endurance;Decreased self-care trans;Decreased high-level ADLs; Decreased sensation;Decreased cognition   Prognosis Good   Assessment Patient is a 76 y.o. male seen for OT evaluation s/p admit to [facility]  on 10/25/2023 w/Diabetic ulcer of left great toe ([address]). Commorbidities affecting patient's functional performance at time of assessment include: DM, osteomyelitis and abdominal pain. Orders placed for OT evaluation and treatment and up and OOB as tolerated . Performed at least two patient identifiers during session including name and wristband. Prior to admission, Patient reporting being independent with ADLs/IADLs, ambulatory with no AD, and lives with family. Personal factors affecting patient at time of initial evaluation include: steps to enter, difficulty performing ADLs, and difficulty performing IADLs. Upon evaluation, patient requires supervision assist for UB ADLs, minimal  assist for LB ADLs, transfers and functional ambulation in room and bathroom with contact guard assist, with the use of [email or web address] Patient is oriented x 4.   Occupational performance is affected by the following deficits: decreased muscle strength, dynamic sit/ stand balance deficit with poor standing tolerance time for self care and functional mobility, decreased activity tolerance, impaired sensation, impaired memory, increased pain, and delayed righting and equilibrium reactions. Patient to benefit from continued Occupational Therapy treatment while in the hospital to address deficits as defined above and maximize level of functional independence with ADLs and functional mobility. Occupational Performance areas to address include: grooming , bathing/ shower, dressing, toilet hygiene, transfer to all surfaces, and functional ambulation. From OT standpoint, recommendation at time of d/c would be Level II (moderate resource intensity). Goals   Patient Goals to go home   Plan   Treatment Interventions ADL retraining;Functional transfer training; Endurance training;UE strengthening/ROM; Patient/family training; Compensatory technique education; Activityengagement   Goal Expiration Date 11/11/23   OT Treatment Day 0   OT Frequency 1-2x/wk   Discharge Recommendation   Rehab Resource Intensity Level, OT II (Moderate Resource Intensity)   Additional Comments  The patient's raw score on the AM-PAC Daily Activity Inpatient Short Form is 20. A raw score of greater than or equal to 19 suggests the patient may benefit from discharge to home. Please refer to the recommendation of the Occupational Therapist for safe discharge planning.    AM-PAC Daily Activity Inpatient   Lower Body Dressing 3   Bathing 3   Toileting 3   Upper Body Dressing 3   Grooming 4   Eating 4   Daily Activity Raw Score 20   Daily Activity Standardized Score (Calc for Raw Score >=11) 42.03   AM-[facility] Applied Cognition Inpatient   Following a Speech/Presentation 3   Understanding Ordinary Conversation 4   Taking Medications 3   Remembering Where Things Are Placed or Put Away 3   Remembering List of 4-5 Errands 3   Taking Care of Complicated Tasks 3   Applied Cognition Raw Score 19   Applied Cognition Standardized Score 39.77   Mini-Cog Assessment   Word Version Version 1: Banana, Sunrise, Chair   Clock Draw (CDT) 2   Word Recall 0   Mini-Cog Score 2   Mini-Cog Results Positive screen for dementia   End of Consult   Patient Position at End of Consult Bedside chair; All needs within reach   Nurse Communication Nurse aware of consult     GOALS:    *ADL transfers with (I) for inc'd independence with ADLs/purposeful tasks    *UB ADL with (I) for inc'd independence with self cares    *LB ADL with (I) using AE prn for inc'd independence with self cares    *Toileting with (I) for clothing management and hygiene for return to PLOF with personal care    *Increase stand tolerance x 5  m for inc'd tolerance with standing purposeful tasks    *Participate in 10m UE therex to increase overall stamina/activity tolerance for purposeful tasks    *Bed mobility- (I) for inc'd independence to manage own comfort and initiate EOB & OOB purposeful tasks    *Patient will verbalize 3 safety awareness/ principles to prevent falls in the home setting. *Patient will increase OOB/sitting tolerance to 2-4 hours per day to increase participation in self-care and leisure tasks with no s/s of exertion. *Pt will verbalize and demonstrate understanding of post-op movement precautions 100% in tx sessions for increased safety and functional mobility.      Kerri Net, OTD, OTR/L

## 2023-11-01 NOTE — NURSING NOTE
Pt with fever of 101.2 last night. Pt refusing tylenol even after pt education about the importance of fever reduction. Pt packed with ice bags under the arms. Pt's came down to 99.6. No further orders or pt complaints at this time.

## 2023-11-01 NOTE — PHYSICAL THERAPY NOTE
Physical Therapy Evaluation     Patient's Name: Regi Metcalf    Admitting Diagnosis  Dizziness [R42]  Cellulitis in diabetic foot  [E11.628, V52.512]  Cellulitis of left foot [L03.116]    Problem List  Patient Active Problem List   Diagnosis    Diabetes mellitus ([address])    Abdominal pain    Diabetic ulcer of left great toe ([address])    Osteomyelitis [facility])       Past Medical History  Past Medical History:   Diagnosis Date    Diabetes mellitus ([address])     Prostate cancer [facility])        Past Surgical History  Past Surgical History:   Procedure Laterality Date    PROSTATE SURGERY      TOE AMPUTATION Left 10/31/2023    Procedure: AMPUTATION OF LEFT HALLUX;  Surgeon: María Blackwell DPM;  Location: [facility];  Service: Podiatry          11/01/23 8643   Note Type   Additional Comments 11.1   Pain Assessment   Pain Assessment Tool 0-10   Pain Score 4   Pain Location/Orientation Orientation: Left; Location: Foot   Pain Onset/Description Onset: Ongoing; Descriptor: Throbbing   Restrictions/Precautions   Weight Bearing Precautions Per Order Yes   LLE Weight Bearing Per Order WBAT  ("weight bearing as tolerated in a post op shoe" per poditary op note on 10/31/23)   Braces or Orthoses   (Surgical shoe donned prior to mobility)   Other Precautions Multiple lines;WBS;Fall Risk;Pain;Contact/isolation   Home Living   Type of [address] [place] [address]  Multi-level;Stairs to enter with rails; Performs ADLs on one level  ([address]; 1st floor set-up)   Bathroom Shower/Tub Walk-in shower   Bathroom Toilet Standard   Bathroom Equipment Grab bars in shower; Shower chair   [address]   (no AD used at baseline)   Prior Function   Level of [place] Independent with ADLs; Independent with functional mobility; Needs assistance with IADLS   Lives With Spouse; Family  (& grandson)   Receives Help From Family   IADLs Independent with driving;Family/Friend/Other provides meals; Independent with medication management Falls in the last 6 months 1 to 4  (1 fall)   Vocational Retired   General   Family/Caregiver Present No   Cognition   Overall Cognitive Status   (Questionable)   Arousal/Participation Alert   Attention Within functional limits   Orientation Level Oriented X4   Memory Decreased short term memory   Following Commands Follows all commands and directions without difficulty   Comments pt agreeable to PT session   RLE Assessment   RLE Assessment   (grossly 4-/5 observed with functional mobility)   LLE Assessment   LLE Assessment   (grossly 4-/5 observed with functional mobility)   Vision-Basic Assessment   Current Vision Wears glasses all the time   Coordination   Movements are Fluid and Coordinated 1   Sensation   (baseline neuropathy)   Bed Mobility   Additional Comments pt seated OOB in recliner upon arrival and at end of session   Transfers   Sit to Stand 4  Minimal assistance  (CGA)   Additional items Assist x 1; Armrests; Increased time required;Verbal cues   Stand to Sit 4  Minimal assistance  (CGA)   Additional items Assist x 1; Armrests; Increased time required;Verbal cues   Additional Comments pt educated on WBS per podiatry and encouraged use of RW to offload LLE, as well as primarily heel wb   Ambulation/Elevation   Gait pattern Decreased foot clearance;Decreased L stance; Short stride; Step to; Antalgic   Gait Assistance 4  Minimal assist  (CGA)   Additional items Assist x 1;Verbal cues   Assistive Device Rolling walker   Distance 25'   Balance   Static Sitting Good   Dynamic Sitting Fair +   Static Standing Fair   Dynamic Standing Fair -   Ambulatory Fair -   Endurance Deficit   Endurance Deficit Yes   Endurance Deficit Description HR reading 118bpm during gait trial   Activity Tolerance   Activity Tolerance Patient limited by pain   Medical Staff Made Aware Pt seen as a co-eval with OT due to the patient's co-morbidities and clinically unstable presentation indicated by chart review.    Nurse Made Aware [facility]   Assessment   Prognosis Good   Problem List Decreased strength;Decreased endurance; Impaired balance;Decreased mobility; Impaired sensation;Pain;Decreased skin integrity  (podiatry restrictions)   Assessment Pt is 76 y.o. male seen for PT evaluation on 11/1/2023 s/p admit to [address] [address] [place] on 10/25/2023 w/ Diabetic ulcer of left great toe ([address] Pt POD 1 s/p Left hallux amputation. PT was consulted to assess pt's functional mobility and d/c needs. Order placed for PT eval and tx. PTA, pt resides with spouse in [facility] with 1st floor setup, [address], ambulates without AD. At time of eval, pt performing transfers and household distance gait trial CGA with use of RW. Upon evaluation, pt presenting with impaired functional mobility d/t decreased strength, decreased endurance, impaired balance, decreased mobility, impaired sensation, decreased skin integrity, pain, and activity intolerance. Pertinent PMHx and current co-morbidities affecting pt's physical performance at time of assessment include: diabetic ulcer of L great toe, DM, osteomyelitis, abdominal pain. Personal factors affecting pt at time of eval include: ambulating w/ assistive device, stairs to enter home, inability to navigate community distances, and positive fall history. The following objective measures performed on IE also reveal limitations: Barthel Index: 55/100, Modified [place]: 4 (moderate/severe disability), and AM-PAC 6-Clicks: 44/08. Pt's clinical presentation is currently unstable/unpredictable seen in pt's presentation of abnormal lab value(s), L foot pain impacting overall mobility status, and ongoing medical assessment. Overall, pt's rehab potential and prognosis to return to OF is good as impacted by objective findings, warranting pt to receive further skilled PT interventions to address identified impairments, activity limitation(s), and participation restriction(s).  Pt to benefit from continued PT tx to address deficits as defined above and maximize level of functional independent mobility. From PT/mobility standpoint, recommend level 2, moderate resource intensity in order to facilitate return to PLOF. Barriers to Discharge Inaccessible home environment;Decreased caregiver support   Goals   Patient Goals to go home   STG Expiration Date 11/11/23   Short Term Goal #1 In 7-10 days: Increase bilateral LE strength 1/2 grade to facilitate independent mobility, Perform all bed mobility tasks modified independent to decrease caregiver burden, Perform all transfers modified independent to improve independence, Ambulate > 150 ft. with least restrictive assistive device modified independent w/o LOB and w/ normalized gait pattern 100% of the time, Navigate 13 stairs with distant S with unilateral handrail to facilitate return to previous living environment, Increase all balance 1/2 grade to decrease risk for falls, Improve Barthel Index score to 70 or greater to facilitate independence, and PT provider will perform functional balance assessment to determine fall risk   PT Treatment Day 0   Plan   Treatment/Interventions Functional transfer training;LE strengthening/ROM; Therapeutic exercise; Endurance training;Patient/family training;Equipment eval/education; Bed mobility;Gait training;Spoke to nursing;Elevations;OT   PT Frequency 3-5x/wk   Discharge Recommendation   Rehab Resource Intensity Level, PT II (Moderate Resource Intensity)   Equipment Recommended Walker  (RW)   Walker Package Recommended Wheeled walker   Change/add to [place]?  No   AM-PAC Basic Mobility Inpatient   Turning in Flat Bed Without Bedrails 3   Lying on Back to Sitting on Edge of Flat Bed Without Bedrails 3   Moving Bed to Chair 3   Standing Up From Chair Using Arms 3   Walk in Room 3   Climb 3-5 Stairs With Railing 3   Basic Mobility Inpatient Raw Score 18   Basic Mobility Standardized Score 41.05   Highest Level Of Mobility   -HL Goal 6: Walk 10 steps or more   -HLM Achieved 7: Walk 25 feet or more   Modified Raulito Scale   Modified [place] Scale 4   Barthel Index   Feeding 10   Bathing 0   Grooming Score 5   Dressing Score 5   Bladder Score 10   Bowels Score 10   Toilet Use Score 5   Transfers (Bed/Chair) Score 10   Mobility (Level Surface) Score 0   Stairs Score 0   Barthel Index Score 55         Kofi Horner, PT, DPT

## 2023-11-01 NOTE — ASSESSMENT & PLAN NOTE
Postop day 1 status post left hallux amputation at metatarsophalangeal joint and left hallux abscess incision and drainage  Nonhealing ulceration of the left great toe, exacerbated by poorly controlled diabetes as evidenced by hemoglobin A1c of 14.7% as an outpatient  X-ray of the left great toe concerning for osteomyelitis at the distal aspect of the toe. Reviewed MRI: Osteomyelitis identified in the distal proximal and distal phalanxes  Wound cultures grew MRSA; based on JOSE patient has been placed on daptomycin.    Switch to bactrim  Bilateral ABIs WNL

## 2023-11-01 NOTE — CASE MANAGEMENT
Case Management Progress Note    Patient name Karli Red  Location [address] [facility] [place] 307/-30 MRN [id number]  : 1955 Date 2023       LOS (days): 7  Geometric Mean LOS (GMLOS) (days):   Days to GMLOS:        OBJECTIVE:        Current admission status: Inpatient  Preferred Pharmacy:   [id number] [address] [address]  Phone: [phone] Fax: [phone]    [address] [place] [address]  Phone: [phone] Fax: [phone]    Primary Care Provider: Marco Antonio So MD    [facility]  Secondary Insurance:     PROGRESS NOTE:  Called patient's PCP through [place] main line as directed to place referral for [facility] services and to obtain insurance authorization for same.  Pending reply

## 2023-11-01 NOTE — PLAN OF CARE
Problem: PAIN - ADULT  Goal: Verbalizes/displays adequate comfort level or baseline comfort level  Description: Interventions:  - Encourage patient to monitor pain and request assistance  - Assess pain using appropriate pain scale  - Administer analgesics based on type and severity of pain and evaluate response  - Implement non-pharmacological measures as appropriate and evaluate response  - Consider cultural and social influences on pain and pain management  - Notify physician/advanced practitioner if interventions unsuccessful or patient reports new pain  Outcome: Progressing     Problem: INFECTION - ADULT  Goal: Absence or prevention of progression during hospitalization  Description: INTERVENTIONS:  - Assess and monitor for signs and symptoms of infection  - Monitor lab/diagnostic results  - Monitor all insertion sites, i.e. indwelling lines, tubes, and drains  - Monitor endotracheal if appropriate and nasal secretions for changes in amount and color  - [place] appropriate cooling/warming therapies per order  - Administer medications as ordered  - Instruct and encourage patient and family to use good hand hygiene technique  - Identify and instruct in appropriate isolation precautions for identified infection/condition  Outcome: Progressing

## 2023-11-01 NOTE — PLAN OF CARE
Problem: OCCUPATIONAL THERAPY ADULT  Goal: Performs self-care activities at highest level of function for planned discharge setting. See evaluation for individualized goals. Description: Treatment Interventions: ADL retraining, Functional transfer training, Endurance training, UE strengthening/ROM, Patient/family training, Compensatory technique education, Activityengagement          See flowsheet documentation for full assessment, interventions and recommendations. Note: Limitation: Decreased ADL status, Decreased UE strength, Decreased endurance, Decreased self-care trans, Decreased high-level ADLs, Decreased sensation, Decreased cognition  Prognosis: Good  Assessment: Patient is a 76 y.o. male seen for OT evaluation s/p admit to [facility]  on 10/25/2023 w/Diabetic ulcer of left great toe ([address] Commorbidities affecting patient's functional performance at time of assessment include: DM, osteomyelitis and abdominal pain. Orders placed for OT evaluation and treatment and up and OOB as tolerated . Performed at least two patient identifiers during session including name and wristband. Prior to admission, Patient reporting being independent with ADLs/IADLs, ambulatory with no AD, and lives with family. Personal factors affecting patient at time of initial evaluation include: steps to enter, difficulty performing ADLs, and difficulty performing IADLs. Upon evaluation, patient requires supervision assist for UB ADLs, minimal  assist for LB ADLs, transfers and functional ambulation in room and bathroom with contact guard assist, with the use of [address]. Patient is oriented x 4.   Occupational performance is affected by the following deficits: decreased muscle strength, dynamic sit/ stand balance deficit with poor standing tolerance time for self care and functional mobility, decreased activity tolerance, impaired sensation, impaired memory, increased pain, and delayed righting and equilibrium reactions. Patient to benefit from continued Occupational Therapy treatment while in the hospital to address deficits as defined above and maximize level of functional independence with ADLs and functional mobility. Occupational Performance areas to address include: grooming , bathing/ shower, dressing, toilet hygiene, transfer to all surfaces, and functional ambulation. From OT standpoint, recommendation at time of d/c would be Level II (moderate resource intensity).      Rehab Resource Intensity Level, OT: II (Moderate  Resource Intensity)     Niki Cuba OTRUSS, OTR/L

## 2023-11-01 NOTE — CASE MANAGEMENT
Case Management Progress Note    Patient name Khang Holloway  [facility] MRN [id number]  : 1955 Date 2023       LOS (days): 7  Geometric Mean LOS (GMLOS) (days):   Days to GMLOS:        OBJECTIVE:        Current admission status: Inpatient  Preferred Pharmacy:   [address] [place] [address], [address] [place] [address] Road [address]  Phone: [phone] Fax: 951 [address] [address]  Phone: [phone] Fax: [phone]    Primary Care Provider: Mj Dominguez MD    Primary Insurance: [employer]  Secondary Insurance:     PROGRESS NOTE:    Faxed wound care orders to [place] PCP office.

## 2023-11-01 NOTE — PROGRESS NOTES
Consultation - Podiatric Surgery    Tamika Lovell 76 y.o. male MRN: [id number]  Unit/Bed#: -Matias Encounter: [id number]      Assessment/Plan     Assessment:  Patient s/p Left hallux amputation done on 10/31/23. Plan:  -All bone infection removed in OR. Patient would benefit from IV antibiotics for another day. Upon discharge I would recommend a week of oral antibiotics depending on cultures.   -Patient needs to be weight bearing to the left heel in a surgical shoe. Right now he states he does not feel comfortable doing this and would like to work with physical therapy more. -Dressing to be changed every 2 days while he is in the hospital and once he goes home then will plan to leave it intact until he sees me in the office. Subjective: Patient reports no pain just a little bit of throbbing. Review of Systems      Family History: History reviewed. No pertinent family history. Meds/Allergies   all current active meds have been reviewed  Allergies   Allergen Reactions    Codeine GI Intolerance    Lisinopril        Objective   Vitals: Blood pressure 120/71, pulse (!) 112, temperature 99.6 °F (37.6 °C), temperature source Oral, resp. rate 20, height 5' 10" (1.778 m), weight 82.7 kg (182 lb 5.1 oz), SpO2 95 %. ,Body mass index is 26.16 kg/m². Intake/Output Summary (Last 24 hours) at 11/1/2023 0849  Last data filed at 10/31/2023 1950  Gross per 24 hour   Intake 960 ml   Output 200 ml   Net 760 ml       I/O last 24 hours:   In: 200 [P.O.:960; I.V.:700]  Out: 205 [Urine:200; Blood:5]    Invasive Devices       Peripheral Intravenous Line  Duration             Peripheral IV 10/29/23 Left Wrist 3 days                    Physical Exam  Ortho Exam    Vascular:   -DP pulse is palpable B/L, PT pulse is non-palpable B/L   -Capillary refill time is less than 3 seconds B/L  -Pedal hair growth is diminished B/L  -Temperature is warm to warm  from ankle to toes B/L   -There is edema noted to the LLE compared to the RLE. Neuro:  -Light touch and protective sensation is absent      Derm:  The left hallux amputation site is healthy, no signs of dehiscence. There is some erythema and edema about the incision. Lab Results:   Results from last 7 days   Lab Units 10/31/23  0503 10/27/23  0620 10/26/23  0451   WBC Thousand/uL 8.31   < > 11.81*   HEMOGLOBIN g/dL 12.6   < > 12.2   HEMATOCRIT % 37.4   < > 35.9*   PLATELETS Thousands/uL 319   < > 271   NEUTROS PCT %  --   --  74   LYMPHS PCT %  --   --  13*   MONOS PCT %  --   --  11   EOS PCT %  --   --  1    < > = values in this interval not displayed.          Moreno Mims DPM

## 2023-11-01 NOTE — PLAN OF CARE
Problem: PAIN - ADULT  Goal: Verbalizes/displays adequate comfort level or baseline comfort level  Description: Interventions:  - Encourage patient to monitor pain and request assistance  - Assess pain using appropriate pain scale  - Administer analgesics based on type and severity of pain and evaluate response  - Implement non-pharmacological measures as appropriate and evaluate response  - Consider cultural and social influences on pain and pain management  - Notify physician/advanced practitioner if interventions unsuccessful or patient reports new pain  Outcome: Progressing     Problem: INFECTION - ADULT  Goal: Absence or prevention of progression during hospitalization  Description: INTERVENTIONS:  - Assess and monitor for signs and symptoms of infection  - Monitor lab/diagnostic results  - Monitor all insertion sites, i.e. indwelling lines, tubes, and drains  - Monitor endotracheal if appropriate and nasal secretions for changes in amount and color  - [place] appropriate cooling/warming therapies per order  - Administer medications as ordered  - Instruct and encourage patient and family to use good hand hygiene technique  - Identify and instruct in appropriate isolation precautions for identified infection/condition  Outcome: Progressing  Goal: Absence of fever/infection during neutropenic period  Description: INTERVENTIONS:  - Monitor WBC    Outcome: Progressing     Problem: SAFETY ADULT  Goal: Patient will remain free of falls  Description: INTERVENTIONS:  - Educate patient/family on patient safety including physical limitations  - Instruct patient to call for assistance with activity   - Consult OT/PT to assist with strengthening/mobility   - Keep Call bell within reach  - Keep bed low and locked with side rails adjusted as appropriate  - Keep care items and personal belongings within reach  - Initiate and maintain comfort rounds  - Make Fall Risk Sign visible to staff  - Apply yellow socks and bracelet for high fall risk patients  - Consider moving patient to room near nurses station  Outcome: Progressing  Goal: Maintain or return to baseline ADL function  Description: INTERVENTIONS:  -  Assess patient's ability to carry out ADLs; assess patient's baseline for ADL function and identify physical deficits which impact ability to perform ADLs (bathing, care of mouth/teeth, toileting, grooming, dressing, etc.)  - Assess/evaluate cause of self-care deficits   - Assess range of motion  - Assess patient's mobility; develop plan if impaired  - Assess patient's need for assistive devices and provide as appropriate  - Encourage maximum independence but intervene and supervise when necessary  - Involve family in performance of ADLs  - Assess for home care needs following discharge   - Consider OT consult to assist with ADL evaluation and planning for discharge  - Provide patient education as appropriate  Outcome: Progressing  Goal: Maintains/Returns to pre admission functional level  Description: INTERVENTIONS:  - Perform BMAT or MOVE assessment daily.   - Set and communicate daily mobility goal to care team and patient/family/caregiver.    - Collaborate with rehabilitation services on mobility goals if consulted  - Out of bed for toileting  - Record patient progress and toleration of activity level   Outcome: Progressing     Problem: DISCHARGE PLANNING  Goal: Discharge to home or other facility with appropriate resources  Description: INTERVENTIONS:  - Identify barriers to discharge w/patient and caregiver  - Arrange for needed discharge resources and transportation as appropriate  - Identify discharge learning needs (meds, wound care, etc.)  - Arrange for interpretive services to assist at discharge as needed  - Refer to Case Management Department for coordinating discharge planning if the patient needs post-hospital services based on physician/advanced practitioner order or complex needs related to functional status, cognitive ability, or social support system  Outcome: Progressing     Problem: Knowledge Deficit  Goal: Patient/family/caregiver demonstrates understanding of disease process, treatment plan, medications, and discharge instructions  Description: Complete learning assessment and assess knowledge base.   Interventions:  - Provide teaching at level of understanding  - Provide teaching via preferred learning methods  Outcome: Progressing

## 2023-11-01 NOTE — PROGRESS NOTES
[address]  Progress Note  Name: Iris Samaniego  MRN: [id number]  Unit/Bed#: -01 I Date of Admission: 10/25/2023   Date of Service: 11/1/2023 I Hospital Day: 7    Assessment/Plan   * Osteomyelitis [facility])  Assessment & Plan  S/p amputation with podiatry  Podiatry recommends 1 week of antibiotic coverage after surgery until 11/7/2023  Daptomycin switch to Bactrim  PT OT recommends level 2 moderate resource intensity    Diabetic ulcer of left great toe (HCC)  Assessment & Plan    Postop day 1 status post left hallux amputation at metatarsophalangeal joint and left hallux abscess incision and drainage  Nonhealing ulceration of the left great toe, exacerbated by poorly controlled diabetes as evidenced by hemoglobin A1c of 14.7% as an outpatient  X-ray of the left great toe concerning for osteomyelitis at the distal aspect of the toe. Reviewed MRI: Osteomyelitis identified in the distal proximal and distal phalanxes  Wound cultures grew MRSA; based on JOSE patient has been placed on daptomycin. Switch to bactrim  Bilateral ABIs WNL          Diabetes mellitus ([address])  Assessment & Plan  Blood glucose very poorly controlled with hemoglobin A1c of 14.7%. Last A1c on record was 4 years ago. Blood glucose elevated. Increase Lantus to 38 units and increase mealtime insulin to 8-8-8  Cont ISS for correctional coverage  Hypoglycemia protocol  Consistent carb diet  Continue to monitor and titrate as necessary                   VTE Pharmacologic Prophylaxis:    Heparin    Patient Centered Rounds: I performed bedside rounds with nursing staff today. Discussions with Specialists or Other Care Team Provider: Yes, case management    Education and Discussions with Family / Patient:  Discussed with patient. Patient refused my offer to call family at this time. .     Total Time Spent on Date of Encounter in care of patient: 25 mins.  This time was spent on one or more of the following: performing physical exam; counseling and coordination of care; obtaining or reviewing history; documenting in the medical record; reviewing/ordering tests, medications or procedures; communicating with other healthcare professionals and discussing with patient's family/caregivers. Current Length of Stay: 7 day(s)  Current Patient Status: Inpatient   Certification Statement: Patient will need continued stay in the hospital secondary to A fever episode of 101.7 and I would like patient to be afebrile for 24 hours prior to discharge  Discharge Plan: Anticipate discharge tomorrow to home with home services. Code Status: Level 3 - DNAR and DNI    Subjective:   Seen and examined at bedside  Pain well controlled in the left foot  Patient spiked a fever last night. He feels stable at this time    Objective:     Vitals:   Temp (24hrs), Av.3 °F (37.4 °C), Min:97.9 °F (36.6 °C), Max:101.2 °F (38.4 °C)    Temp:  [97.9 °F (36.6 °C)-101.2 °F (38.4 °C)] 98.4 °F (36.9 °C)  HR:  [] 102  Resp:  [18-20] 18  BP: (120-158)/(71-85) 120/71  SpO2:  [95 %] 95 %  Body mass index is 26.16 kg/m². Input and Output Summary (last 24 hours):      Intake/Output Summary (Last 24 hours) at 2023 1255  Last data filed at 10/31/2023 1950  Gross per 24 hour   Intake 960 ml   Output 200 ml   Net 760 ml       Physical Exam:   Physical Exam General- Awake, alert and oriented x 3, looks comfortable  HEENT- Normocephalic, atraumatic, oral mucosa- moist  Neck- Supple, No carotid bruit, no JVD  CVS- Normal S1/ S2, Regular rate and rhythm, No murmur, No edema  Respiratory system- B/L clear breath sounds, no wheezing  Abdomen- Soft, Non distended, no tenderness, Bowel sound- present 4 quads  Genitourinary- No suprapubic tenderness, No CVA tenderness  Skin- No new bruise or rash  Musculoskeletal-left foot under dressing and Unna boot   psych- No acute psychosis  CNS- CN II- XII grossly intact, No acute focal neurologic deficit noted      Additional Data:     Labs:  Results from last 7 days   Lab Units 10/31/23  0503 10/27/23  0620 10/26/23  0451   WBC Thousand/uL 8.31   < > 11.81*   HEMOGLOBIN g/dL 12.6   < > 12.2   HEMATOCRIT % 37.4   < > 35.9*   PLATELETS Thousands/uL 319   < > 271   NEUTROS PCT %  --   --  74   LYMPHS PCT %  --   --  13*   MONOS PCT %  --   --  11   EOS PCT %  --   --  1    < > = values in this interval not displayed. Results from last 7 days   Lab Units 10/31/23  0503 10/26/23  0451 10/25/23  2030   SODIUM mmol/L 135   < > 128*   POTASSIUM mmol/L 4.3   < > 4.2   CHLORIDE mmol/L 105   < > 95*   CO2 mmol/L 25   < > 21   BUN mg/dL 20   < > 25   CREATININE mg/dL 0.72   < > 1.10   ANION GAP mmol/L 5   < > 12   CALCIUM mg/dL 8.2*   < > 8.4   ALBUMIN g/dL  --   --  3.2*   TOTAL BILIRUBIN mg/dL  --   --  0.63   ALK PHOS U/L  --   --  98   ALT U/L  --   --  11   AST U/L  --   --  13   GLUCOSE RANDOM mg/dL 239*   < > 309*    < > = values in this interval not displayed. Results from last 7 days   Lab Units 10/25/23  2030   INR  1.08     Results from last 7 days   Lab Units 11/01/23  1127 11/01/23  0729 10/31/23  2044 10/31/23  1621 10/31/23  0954 10/30/23  2103 10/30/23  1539 10/30/23  1108 10/30/23  0743 10/29/23  2044 10/29/23  1509 10/29/23  1108   POC GLUCOSE mg/dl 244* 233* 403* 312* 195* 285* 230* 235* 132 227* 342* 241*     Results from last 7 days   Lab Units 10/26/23  0128   HEMOGLOBIN A1C % 14.7*     Results from last 7 days   Lab Units 10/25/23  2030   LACTIC ACID mmol/L 1.0   PROCALCITONIN ng/ml 1.11*       Lines/Drains:  Invasive Devices       Peripheral Intravenous Line  Duration             Peripheral IV 10/29/23 Left Wrist 3 days                          Imaging: No pertinent imaging reviewed. Recent Cultures (last 7 days):   Results from last 7 days   Lab Units 10/31/23  0820 10/31/23  0818 10/27/23  1300 10/25/23  2033 10/25/23  2030   BLOOD CULTURE   --   --   --  No Growth After 5 Days. No Growth After 5 Days.    Jayla Aly STAIN RESULT  1+ Polys*  Rare Gram positive cocci in pairs* 1+ Polys*  4+ Gram negative rods*  1+ Gram positive cocci in clusters*  Rare Gram positive cocci in chains* Rare Polys*  3+ Gram negative rods*  2+ Gram positive cocci in pairs*  --   --    WOUND CULTURE   --   --  2+ Growth of Methicillin Resistant Staphylococcus aureus*  1+ Growth of Beta Hemolytic Streptococcus Group G*  2+ Growth of  --   --        Last 24 Hours Medication List:   Current Facility-Administered Medications   Medication Dose Route Frequency Provider Last Rate    acetaminophen  650 mg Oral Q6H PRN Tyron Cornejo DPM      heparin (porcine)  5,000 Units Subcutaneous [facility] Tyron Cornejo DPM      insulin glargine  38 Units Subcutaneous HS Magda Smith MD      insulin lispro  1-6 Units Subcutaneous 4x Daily (AC & HS) Shelly Prince PA-C      insulin lispro  8 Units Subcutaneous TID With Meals Magda Smith MD      sulfamethoxazole-trimethoprim  1 tablet Oral Q12H [address] Magda Smith MD      traMADol  50 mg Oral Q6H PRN Tyron Cornejo DPM          Today, Patient Was Seen By: Magda Smith MD    **Please Note: This note may have been constructed using a voice recognition system. **

## 2023-11-01 NOTE — ASSESSMENT & PLAN NOTE
S/p amputation with podiatry  Podiatry recommends 1 week of antibiotic coverage after surgery until 11/7/2023  Daptomycin switch to Bactrim  PT OT recommends level 2 moderate resource intensity

## 2023-11-01 NOTE — PLAN OF CARE
Problem: PAIN - ADULT  Goal: Verbalizes/displays adequate comfort level or baseline comfort level  Description: Interventions:  - Encourage patient to monitor pain and request assistance  - Assess pain using appropriate pain scale  - Administer analgesics based on type and severity of pain and evaluate response  - Implement non-pharmacological measures as appropriate and evaluate response  - Consider cultural and social influences on pain and pain management  - Notify physician/advanced practitioner if interventions unsuccessful or patient reports new pain  Outcome: Progressing     Problem: INFECTION - ADULT  Goal: Absence or prevention of progression during hospitalization  Description: INTERVENTIONS:  - Assess and monitor for signs and symptoms of infection  - Monitor lab/diagnostic results  - Monitor all insertion sites, i.e. indwelling lines, tubes, and drains  - Monitor endotracheal if appropriate and nasal secretions for changes in amount and color  - [place] appropriate cooling/warming therapies per order  - Administer medications as ordered  - Instruct and encourage patient and family to use good hand hygiene technique  - Identify and instruct in appropriate isolation precautions for identified infection/condition  Outcome: Progressing  Goal: Absence of fever/infection during neutropenic period  Description: INTERVENTIONS:  - Monitor WBC    Outcome: Progressing     Problem: DISCHARGE PLANNING  Goal: Discharge to home or other facility with appropriate resources  Description: INTERVENTIONS:  - Identify barriers to discharge w/patient and caregiver  - Arrange for needed discharge resources and transportation as appropriate  - Identify discharge learning needs (meds, wound care, etc.)  - Arrange for interpretive services to assist at discharge as needed  - Refer to Case Management Department for coordinating discharge planning if the patient needs post-hospital services based on physician/advanced practitioner order or complex needs related to functional status, cognitive ability, or social support system  Outcome: Progressing     Problem: Knowledge Deficit  Goal: Patient/family/caregiver demonstrates understanding of disease process, treatment plan, medications, and discharge instructions  Description: Complete learning assessment and assess knowledge base.   Interventions:  - Provide teaching at level of understanding  - Provide teaching via preferred learning methods  Outcome: Progressing

## 2023-11-01 NOTE — ASSESSMENT & PLAN NOTE
Blood glucose very poorly controlled with hemoglobin A1c of 14.7%. Last A1c on record was 4 years ago. Blood glucose elevated.   Increase Lantus to 38 units and increase mealtime insulin to 8-8-8  Cont ISS for correctional coverage  Hypoglycemia protocol  Consistent carb diet  Continue to monitor and titrate as necessary

## 2023-11-02 VITALS
BODY MASS INDEX: 25.79 KG/M2 | SYSTOLIC BLOOD PRESSURE: 129 MMHG | OXYGEN SATURATION: 95 % | RESPIRATION RATE: 18 BRPM | HEIGHT: 70 IN | WEIGHT: 180.12 LBS | HEART RATE: 86 BPM | TEMPERATURE: 99.4 F | DIASTOLIC BLOOD PRESSURE: 76 MMHG

## 2023-11-02 LAB
ANION GAP SERPL CALCULATED.3IONS-SCNC: 6 MMOL/L
BACTERIA SPEC ANAEROBE CULT: ABNORMAL
BACTERIA SPEC ANAEROBE CULT: ABNORMAL
BACTERIA TISS AEROBE CULT: ABNORMAL
BASOPHILS # BLD AUTO: 0.04 THOUSANDS/ÂΜL (ref 0–0.1)
BASOPHILS NFR BLD AUTO: 0 % (ref 0–1)
BUN SERPL-MCNC: 18 MG/DL (ref 5–25)
CALCIUM SERPL-MCNC: 8.4 MG/DL (ref 8.4–10.2)
CHLORIDE SERPL-SCNC: 103 MMOL/L (ref 96–108)
CO2 SERPL-SCNC: 25 MMOL/L (ref 21–32)
CREAT SERPL-MCNC: 0.78 MG/DL (ref 0.6–1.3)
EOSINOPHIL # BLD AUTO: 0.21 THOUSAND/ÂΜL (ref 0–0.61)
EOSINOPHIL NFR BLD AUTO: 2 % (ref 0–6)
ERYTHROCYTE [DISTWIDTH] IN BLOOD BY AUTOMATED COUNT: 12.7 % (ref 11.6–15.1)
GFR SERPL CREATININE-BSD FRML MDRD: 92 ML/MIN/1.73SQ M
GLUCOSE SERPL-MCNC: 157 MG/DL (ref 65–140)
GLUCOSE SERPL-MCNC: 162 MG/DL (ref 65–140)
GLUCOSE SERPL-MCNC: 240 MG/DL (ref 65–140)
GRAM STN SPEC: ABNORMAL
HCT VFR BLD AUTO: 38.5 % (ref 36.5–49.3)
HGB BLD-MCNC: 12.8 G/DL (ref 12–17)
IMM GRANULOCYTES # BLD AUTO: 0.12 THOUSAND/UL (ref 0–0.2)
IMM GRANULOCYTES NFR BLD AUTO: 1 % (ref 0–2)
LYMPHOCYTES # BLD AUTO: 2.34 THOUSANDS/ÂΜL (ref 0.6–4.47)
LYMPHOCYTES NFR BLD AUTO: 22 % (ref 14–44)
MCH RBC QN AUTO: 28.5 PG (ref 26.8–34.3)
MCHC RBC AUTO-ENTMCNC: 33.2 G/DL (ref 31.4–37.4)
MCV RBC AUTO: 86 FL (ref 82–98)
MONOCYTES # BLD AUTO: 0.98 THOUSAND/ÂΜL (ref 0.17–1.22)
MONOCYTES NFR BLD AUTO: 9 % (ref 4–12)
NEUTROPHILS # BLD AUTO: 7.16 THOUSANDS/ÂΜL (ref 1.85–7.62)
NEUTS SEG NFR BLD AUTO: 66 % (ref 43–75)
NRBC BLD AUTO-RTO: 0 /100 WBCS
PLATELET # BLD AUTO: 362 THOUSANDS/UL (ref 149–390)
PMV BLD AUTO: 9.4 FL (ref 8.9–12.7)
POTASSIUM SERPL-SCNC: 4.4 MMOL/L (ref 3.5–5.3)
RBC # BLD AUTO: 4.49 MILLION/UL (ref 3.88–5.62)
SODIUM SERPL-SCNC: 134 MMOL/L (ref 135–147)
WBC # BLD AUTO: 10.85 THOUSAND/UL (ref 4.31–10.16)

## 2023-11-02 PROCEDURE — 99239 HOSP IP/OBS DSCHRG MGMT >30: CPT | Performed by: FAMILY MEDICINE

## 2023-11-02 PROCEDURE — 80048 BASIC METABOLIC PNL TOTAL CA: CPT | Performed by: FAMILY MEDICINE

## 2023-11-02 PROCEDURE — 82948 REAGENT STRIP/BLOOD GLUCOSE: CPT

## 2023-11-02 PROCEDURE — 85025 COMPLETE CBC W/AUTO DIFF WBC: CPT | Performed by: FAMILY MEDICINE

## 2023-11-02 RX ORDER — GLUCOSAMINE HCL/CHONDROITIN SU 500-400 MG
CAPSULE ORAL
Qty: 100 EACH | Refills: 0 | Status: SHIPPED | OUTPATIENT
Start: 2023-11-02

## 2023-11-02 RX ORDER — LANCETS 30 GAUGE
EACH MISCELLANEOUS
Qty: 1 KIT | Refills: 0 | Status: SHIPPED | OUTPATIENT
Start: 2023-11-02

## 2023-11-02 RX ORDER — INSULIN GLARGINE 100 [IU]/ML
38 INJECTION, SOLUTION SUBCUTANEOUS
Qty: 11.4 ML | Refills: 0 | Status: SHIPPED | OUTPATIENT
Start: 2023-11-02 | End: 2023-12-02

## 2023-11-02 RX ORDER — SULFAMETHOXAZOLE AND TRIMETHOPRIM 800; 160 MG/1; MG/1
1 TABLET ORAL EVERY 12 HOURS SCHEDULED
Qty: 11 TABLET | Refills: 0 | Status: SHIPPED | OUTPATIENT
Start: 2023-11-02 | End: 2023-11-08

## 2023-11-02 RX ORDER — LANCETS 33 GAUGE
EACH MISCELLANEOUS
Qty: 100 EACH | Refills: 0 | Status: SHIPPED | OUTPATIENT
Start: 2023-11-02

## 2023-11-02 RX ORDER — PEN NEEDLE, DIABETIC 32GX 5/32"
NEEDLE, DISPOSABLE MISCELLANEOUS
Qty: 100 EACH | Refills: 0 | Status: SHIPPED | OUTPATIENT
Start: 2023-11-02

## 2023-11-02 RX ORDER — INSULIN ASPART 100 [IU]/ML
8 INJECTION, SOLUTION INTRAVENOUS; SUBCUTANEOUS
Qty: 7.2 ML | Refills: 0 | Status: SHIPPED | OUTPATIENT
Start: 2023-11-02 | End: 2023-12-02

## 2023-11-02 RX ORDER — ACETAMINOPHEN 325 MG/1
650 TABLET ORAL EVERY 6 HOURS PRN
Refills: 0
Start: 2023-11-02

## 2023-11-02 RX ORDER — BLOOD-GLUCOSE METER
EACH MISCELLANEOUS
Qty: 100 EACH | Refills: 0 | Status: SHIPPED | OUTPATIENT
Start: 2023-11-02

## 2023-11-02 RX ADMIN — INSULIN LISPRO 1 UNITS: 100 INJECTION, SOLUTION INTRAVENOUS; SUBCUTANEOUS at 09:34

## 2023-11-02 RX ADMIN — SULFAMETHOXAZOLE AND TRIMETHOPRIM 1 TABLET: 800; 160 TABLET ORAL at 09:32

## 2023-11-02 RX ADMIN — INSULIN LISPRO 8 UNITS: 100 INJECTION, SOLUTION INTRAVENOUS; SUBCUTANEOUS at 12:30

## 2023-11-02 RX ADMIN — INSULIN LISPRO 3 UNITS: 100 INJECTION, SOLUTION INTRAVENOUS; SUBCUTANEOUS at 12:30

## 2023-11-02 RX ADMIN — HEPARIN SODIUM 5000 UNITS: 5000 INJECTION INTRAVENOUS; SUBCUTANEOUS at 05:48

## 2023-11-02 RX ADMIN — INSULIN LISPRO 8 UNITS: 100 INJECTION, SOLUTION INTRAVENOUS; SUBCUTANEOUS at 09:34

## 2023-11-02 NOTE — ASSESSMENT & PLAN NOTE
Postop day 2 status post left hallux amputation at metatarsophalangeal joint and left hallux abscess incision and drainage  Nonhealing ulceration of the left great toe, exacerbated by poorly controlled diabetes as evidenced by hemoglobin A1c of 14.7% as an outpatient  X-ray of the left great toe concerning for osteomyelitis at the distal aspect of the toe. Reviewed MRI: Osteomyelitis identified in the distal proximal and distal phalanxes  Wound cultures grew MRSA; based on JOSE patient has been placed on daptomycin.    Switch to bactrim  Bilateral ABIs WNL

## 2023-11-02 NOTE — DISCHARGE SUMMARY
[address]  Discharge- Bj Elizabeth 1955, 76 y.o. male MRN: [id number]  Unit/Bed#: MS Sandeep-Matias Encounter: [id number]  Primary Care Provider: Earnest Way MD   Date and time admitted to hospital: 10/25/2023  7:53 PM    * Osteomyelitis [facility])  Assessment & Plan  Afebrile today, clear for DC. Pt not wanting VNA as he has dogs and he does not want to confine them. CM helping him with wound center appt  S/p amputation with podiatry  Podiatry recommends 1 week of antibiotic coverage after surgery until 11/7/2023  Daptomycin switch to Bactrim  PT OT recommends level 2 moderate resource intensity    Diabetic ulcer of left great toe (HCC)  Assessment & Plan    Postop day 2 status post left hallux amputation at metatarsophalangeal joint and left hallux abscess incision and drainage  Nonhealing ulceration of the left great toe, exacerbated by poorly controlled diabetes as evidenced by hemoglobin A1c of 14.7% as an outpatient  X-ray of the left great toe concerning for osteomyelitis at the distal aspect of the toe. Reviewed MRI: Osteomyelitis identified in the distal proximal and distal phalanxes  Wound cultures grew MRSA; based on JOSE patient has been placed on daptomycin. Switch to bactrim  Bilateral ABIs WNL          Diabetes mellitus ([address])  Assessment & Plan  157 this AM, better controlled  Blood glucose very poorly controlled with hemoglobin A1c of 14.7%. Last A1c on record was 4 years ago. Blood glucose elevated.   Increase Lantus to 38 units and increase mealtime insulin to 8-8-8  Cont ISS for correctional coverage  Hypoglycemia protocol  Consistent carb diet  Continue to monitor and titrate as necessary            Medical Problems       Resolved Problems  Date Reviewed: 11/2/2023            Resolved    Pseudohyponatremia 10/30/2023     Resolved by  Ha Price DO    Vertigo 10/30/2023     Resolved by  Ha Price DO        Discharging Physician / Practitioner: Eleanor Snellen, MD  PCP: Kelechi Dimas MD  Admission Date:   Admission Orders (From admission, onward)       Ordered        10/25/23 2224  INPATIENT ADMISSION  Once                          Discharge Date: 11/02/23    Consultations During Hospital Stay:  [address] [place] [address]  IP CONSULT TO PODIATRY  IP CONSULT TO NUTRITION SERVICES      Procedures Performed:   Postop day 2 status post left hallux amputation at metatarsophalangeal joint and left hallux abscess incision and drainage    Significant Findings / Test Results:   MRSA in wound    Incidental Findings:   none       Test Results Pending at Discharge (will require follow up):   none     Outpatient Tests Requested:  BMP in one week, cbc in one week    Complications:  none    Reason for Admission: osteomyelitis    Hospital Course:   Lindsey Lozano is a 76 y.o. male patient who originally presented to the hospital on 10/25/2023 due to osteomyelitis and uncontrolled DMII. Diagnosed with osteomyelitis with MRI and then underwent the above mentioned procedure. Podiatry followed the patient and with dressing changes. Wound care orders have been sent to [address] [place] [address]. Pt is doing better and being discharged to home with VNA. Will need OP podiatry followup. Initially was on daptomycin and then transitioned to bactrim. PT OT was consulted and recommendations followed. Started on insulin and was increased to current dosing 38U lantus and mealtime coverage 8-8-8 with strong recs for diabetic diet. Please see above list of diagnoses and related plan for additional information.      Condition at Discharge: stable    Discharge Day Visit / Exam:   Subjective:  "I feel better"  Vitals: Blood Pressure: 129/76 (11/02/23 0724)  Pulse: 86 (11/01/23 2328)  Temperature: 99.4 °F (37.4 °C) (11/02/23 0724)  Temp Source: Axillary (11/01/23 0727)  Respirations: 18 (11/01/23 0727)  Height: 5' 10" (177.8 cm) (10/25/23 9020)  Weight - Scale: 81.7 kg (180 lb 1.9 oz) (11/02/23 0570)  SpO2: 95 % (11/01/23 7927)  Exam:   Physical Exam General- Awake, alert and oriented x 3, looks comfortable  HEENT- Normocephalic, atraumatic, oral mucosa- moist  Neck- Supple, No carotid bruit, no JVD  CVS- Normal S1/ S2, Regular rate and rhythm, No murmur, No edema  Respiratory system- B/L clear breath sounds, no wheezing  Abdomen- Soft, Non distended, no tenderness, Bowel sound- present 4 quads  Genitourinary- No suprapubic tenderness, No CVA tenderness  Skin- No new bruise or rash  Musculoskeletal- left foot surgical site under dressing and in a boot  Psych- No acute psychosis  CNS- CN II- XII grossly intact, No acute focal neurologic deficit noted      Discussion with Family:  pt and wife updated. Discharge instructions/Information to patient and family:   See after visit summary for information provided to patient and family. Provisions for Follow-Up Care:  See after visit summary for information related to follow-up care and any pertinent home health orders. Disposition:   Home with VNA Services (Reminder: Complete face to face encounter)    Planned Readmission: no     Discharge Statement:  I spent 32 minutes discharging the patient. This time was spent on the day of discharge. I had direct contact with the patient on the day of discharge. Greater than 50% of the total time was spent examining patient, answering all patient questions, arranging and discussing plan of care with patient as well as directly providing post-discharge instructions. Additional time then spent on discharge activities. Discharge Medications:  See after visit summary for reconciled discharge medications provided to patient and/or family.       **Please Note: This note may have been constructed using a voice recognition system**

## 2023-11-02 NOTE — ASSESSMENT & PLAN NOTE
Afebrile today, clear for DC. Pt not wanting VNA as he has dogs and he does not want to confine them.  CM helping him with wound center appt  S/p amputation with podiatry  Podiatry recommends 1 week of antibiotic coverage after surgery until 11/7/2023  Daptomycin switch to Bactrim  PT OT recommends level 2 moderate resource intensity

## 2023-11-02 NOTE — ASSESSMENT & PLAN NOTE
157 this AM, better controlled  Blood glucose very poorly controlled with hemoglobin A1c of 14.7%. Last A1c on record was 4 years ago. Blood glucose elevated.   Increase Lantus to 38 units and increase mealtime insulin to 8-8-8  Cont ISS for correctional coverage  Hypoglycemia protocol  Consistent carb diet  Continue to monitor and titrate as necessary

## 2023-11-02 NOTE — CASE MANAGEMENT
Case Management Progress Note    Patient name Lindsey Lozano  Location [address] [facility] 307/-84 MRN [id number]  : 1955 Date 2023       LOS (days): 8  Geometric Mean LOS (GMLOS) (days):   Days to GMLOS:        OBJECTIVE:        Current admission status: Inpatient  [facility] Pharm[facility]   [id number] [place], [address] [address]  Phone: [phone] Fax: [address] [address]  Phone: [phone] Fax: [phone]    Primary Care Provider: Kelechi Dimas MD    Primary Insurance: [facility]  Secondary Insurance:     PROGRESS NOTE:  Harinder Alba ordered and approve. Patient left prior to dispensing equipment.  Attempted to call patient but number is out of service

## 2023-11-02 NOTE — CASE MANAGEMENT
Case Management Progress Note    Patient name Annita Sun  Location [address] [facility] 307/-60 MRN [id number]  : 1955 Date 2023       LOS (days): 8  Geometric Mean LOS (GMLOS) (days):   Days to GMLOS:        OBJECTIVE:        Current admission status: Inpatient  [facility] Pharmacy:   [facility] [id number] [address] [address]  Phone: [phone] Fax: [phone]    [address] [place] [address]  Phone: [phone] Fax: [phone]    Primary Care Provider: Winston Hernandez MD    [facility] Insurance: [facility]  Secondary Insurance:     PROGRESS NOTE:  Spoke with Wilmon Hammans and Mal Davis regarding op cm, dm education, op pt/ ot, and wound care services. Pt refusing home health services due to his pet.  [facility] made aware

## 2023-11-03 ENCOUNTER — TELEPHONE (OUTPATIENT)
Dept: WOUND CARE | Facility: CLINIC | Age: 68
End: 2023-11-03

## 2023-11-03 LAB
BACTERIA TISS AEROBE CULT: ABNORMAL
BACTERIA TISS AEROBE CULT: ABNORMAL
GRAM STN SPEC: ABNORMAL
GRAM STN SPEC: ABNORMAL

## 2023-11-03 PROCEDURE — 88305 TISSUE EXAM BY PATHOLOGIST: CPT | Performed by: PATHOLOGY

## 2023-11-03 PROCEDURE — 88311 DECALCIFY TISSUE: CPT | Performed by: PATHOLOGY

## 2023-11-03 NOTE — TELEPHONE ENCOUNTER
Rec. Call from  Deepthi Patel to call pt. And set up wound outpt. Appt. I called and spoke with wife who said he is following up with the [facility] hospital for his wound care. I left our phone# if they decide to come to out wound center and told her we would just need an referral/auth. From the [facility].

## 2023-11-06 LAB — GLUCOSE SERPL-MCNC: 177 MG/DL (ref 65–140)

## 2024-02-07 ENCOUNTER — TELEPHONE (OUTPATIENT)
Age: 69
End: 2024-02-07

## 2024-02-07 NOTE — TELEPHONE ENCOUNTER
Patients GI provider:  Dr. Palomares    Number to return call: [phone]    Reason for call: Pt calling because he obtained a copy of his records from 2015/2016 and it shows he saw  but doesn't tell him what his treatment or diagnosis was and he is wondering if we could tell him that? I tried calling the office but the Pt disconnected before I could speak with them and I have nothing in his Epic to refer to for him. Please advise?     Scheduled procedure/appointment date if applicable: NA

## 2024-10-06 ENCOUNTER — APPOINTMENT (EMERGENCY)
Dept: CT IMAGING | Facility: HOSPITAL | Age: 69
End: 2024-10-06
Payer: COMMERCIAL

## 2024-10-06 ENCOUNTER — HOSPITAL ENCOUNTER (OUTPATIENT)
Facility: HOSPITAL | Age: 69
Setting detail: OBSERVATION
Discharge: HOME | End: 2024-10-08
Attending: EMERGENCY MEDICINE
Payer: COMMERCIAL

## 2024-10-06 DIAGNOSIS — R29.90 STROKE-LIKE SYMPTOM: Primary | ICD-10-CM

## 2024-10-06 LAB
2HR DELTA HS TROPONIN: 7 NG/L
ANION GAP SERPL CALCULATED.3IONS-SCNC: 9 MMOL/L (ref 4–13)
APTT PPP: 26 SECONDS (ref 23–34)
ATRIAL RATE: 96 BPM
BUN SERPL-MCNC: 23 MG/DL (ref 5–25)
CALCIUM SERPL-MCNC: 8.8 MG/DL (ref 8.4–10.2)
CARDIAC TROPONIN I PNL SERPL HS: 16 NG/L
CARDIAC TROPONIN I PNL SERPL HS: 23 NG/L
CARDIAC TROPONIN I PNL SERPL HS: 5 NG/L
CHLORIDE SERPL-SCNC: 111 MMOL/L (ref 96–108)
CO2 SERPL-SCNC: 22 MMOL/L (ref 21–32)
CREAT SERPL-MCNC: 1.15 MG/DL (ref 0.6–1.3)
ERYTHROCYTE [DISTWIDTH] IN BLOOD BY AUTOMATED COUNT: 13.2 % (ref 11.6–15.1)
GFR SERPL CREATININE-BSD FRML MDRD: 64 ML/MIN/1.73SQ M
GLUCOSE SERPL-MCNC: 109 MG/DL (ref 65–140)
GLUCOSE SERPL-MCNC: 110 MG/DL (ref 65–140)
GLUCOSE SERPL-MCNC: 91 MG/DL (ref 65–140)
HCT VFR BLD AUTO: 46.1 % (ref 36.5–49.3)
HGB BLD-MCNC: 15.5 G/DL (ref 12–17)
INR PPP: 0.91 (ref 0.85–1.19)
MCH RBC QN AUTO: 30 PG (ref 26.8–34.3)
MCHC RBC AUTO-ENTMCNC: 33.6 G/DL (ref 31.4–37.4)
MCV RBC AUTO: 89 FL (ref 82–98)
P AXIS: 67 DEGREES
PLATELET # BLD AUTO: 226 THOUSANDS/UL (ref 149–390)
PMV BLD AUTO: 10.5 FL (ref 8.9–12.7)
POTASSIUM SERPL-SCNC: 4.1 MMOL/L (ref 3.5–5.3)
PR INTERVAL: 160 MS
PROTHROMBIN TIME: 13 SECONDS (ref 12.3–15)
QRS AXIS: 93 DEGREES
QRSD INTERVAL: 82 MS
QT INTERVAL: 352 MS
QTC INTERVAL: 444 MS
RBC # BLD AUTO: 5.16 MILLION/UL (ref 3.88–5.62)
SODIUM SERPL-SCNC: 142 MMOL/L (ref 135–147)
T WAVE AXIS: 63 DEGREES
VENTRICULAR RATE: 96 BPM
WBC # BLD AUTO: 11.18 THOUSAND/UL (ref 4.31–10.16)

## 2024-10-06 PROCEDURE — 84484 ASSAY OF TROPONIN QUANT: CPT | Performed by: EMERGENCY MEDICINE

## 2024-10-06 PROCEDURE — 85027 COMPLETE CBC AUTOMATED: CPT | Performed by: EMERGENCY MEDICINE

## 2024-10-06 PROCEDURE — 70496 CT ANGIOGRAPHY HEAD: CPT

## 2024-10-06 PROCEDURE — 93010 ELECTROCARDIOGRAM REPORT: CPT | Performed by: INTERNAL MEDICINE

## 2024-10-06 PROCEDURE — 85610 PROTHROMBIN TIME: CPT | Performed by: EMERGENCY MEDICINE

## 2024-10-06 PROCEDURE — 82948 REAGENT STRIP/BLOOD GLUCOSE: CPT

## 2024-10-06 PROCEDURE — 80048 BASIC METABOLIC PNL TOTAL CA: CPT | Performed by: EMERGENCY MEDICINE

## 2024-10-06 PROCEDURE — 70498 CT ANGIOGRAPHY NECK: CPT

## 2024-10-06 PROCEDURE — 93005 ELECTROCARDIOGRAM TRACING: CPT

## 2024-10-06 PROCEDURE — 84484 ASSAY OF TROPONIN QUANT: CPT

## 2024-10-06 PROCEDURE — 99222 1ST HOSP IP/OBS MODERATE 55: CPT

## 2024-10-06 PROCEDURE — 85730 THROMBOPLASTIN TIME PARTIAL: CPT | Performed by: EMERGENCY MEDICINE

## 2024-10-06 PROCEDURE — 36415 COLL VENOUS BLD VENIPUNCTURE: CPT | Performed by: EMERGENCY MEDICINE

## 2024-10-06 PROCEDURE — 99285 EMERGENCY DEPT VISIT HI MDM: CPT | Performed by: EMERGENCY MEDICINE

## 2024-10-06 PROCEDURE — 99285 EMERGENCY DEPT VISIT HI MDM: CPT

## 2024-10-06 RX ORDER — ASPIRIN 81 MG/1
324 TABLET, CHEWABLE ORAL ONCE
Status: COMPLETED | OUTPATIENT
Start: 2024-10-06 | End: 2024-10-06

## 2024-10-06 RX ORDER — ENOXAPARIN SODIUM 100 MG/ML
40 INJECTION SUBCUTANEOUS DAILY
Status: DISCONTINUED | OUTPATIENT
Start: 2024-10-07 | End: 2024-10-08 | Stop reason: HOSPADM

## 2024-10-06 RX ORDER — INSULIN GLARGINE 100 [IU]/ML
30 INJECTION, SOLUTION SUBCUTANEOUS
Status: DISCONTINUED | OUTPATIENT
Start: 2024-10-06 | End: 2024-10-08 | Stop reason: HOSPADM

## 2024-10-06 RX ORDER — CLOPIDOGREL BISULFATE 75 MG/1
75 TABLET ORAL DAILY
Status: DISCONTINUED | OUTPATIENT
Start: 2024-10-07 | End: 2024-10-08 | Stop reason: HOSPADM

## 2024-10-06 RX ORDER — CLOPIDOGREL BISULFATE 75 MG/1
300 TABLET ORAL ONCE
Status: COMPLETED | OUTPATIENT
Start: 2024-10-06 | End: 2024-10-06

## 2024-10-06 RX ORDER — ASPIRIN 81 MG/1
81 TABLET, CHEWABLE ORAL DAILY
Status: DISCONTINUED | OUTPATIENT
Start: 2024-10-07 | End: 2024-10-08 | Stop reason: HOSPADM

## 2024-10-06 RX ORDER — ATORVASTATIN CALCIUM 40 MG/1
40 TABLET, FILM COATED ORAL EVERY EVENING
Status: DISCONTINUED | OUTPATIENT
Start: 2024-10-06 | End: 2024-10-08 | Stop reason: HOSPADM

## 2024-10-06 RX ORDER — INSULIN LISPRO 100 [IU]/ML
1-5 INJECTION, SOLUTION INTRAVENOUS; SUBCUTANEOUS
Status: DISCONTINUED | OUTPATIENT
Start: 2024-10-06 | End: 2024-10-08 | Stop reason: HOSPADM

## 2024-10-06 RX ADMIN — ASPIRIN 324 MG: 81 TABLET, CHEWABLE ORAL at 17:28

## 2024-10-06 RX ADMIN — INSULIN GLARGINE 30 UNITS: 100 INJECTION, SOLUTION SUBCUTANEOUS at 21:34

## 2024-10-06 RX ADMIN — ATORVASTATIN CALCIUM 40 MG: 40 TABLET, FILM COATED ORAL at 20:32

## 2024-10-06 RX ADMIN — CLOPIDOGREL 300 MG: 75 TABLET ORAL at 17:29

## 2024-10-06 RX ADMIN — IOHEXOL 85 ML: 350 INJECTION, SOLUTION INTRAVENOUS at 16:45

## 2024-10-06 NOTE — QUICK NOTE
Neurology Stroke Alert Documentation    Stroke alert called at 1637, neurology response was immediate. Discussed case with ED over phone. History and examination per ED.    Rashaad Centeno is a 69 year old man who presented to the hospital for evaluation of dizziness. Pt stated that he began to have dizziness described as vertigo around 1500 today. He has a history of vertigo and noted it felt similar to prior episodes. He otherwise reported having left facial numbness after symptom onset. No diplopia or other vision changes other than mild blurry vision. BP on arrival 127/78. Has chronic gait dysfunction due to chronic issues with his feet related to diabetes, but noted that ambulation is unchanged from his baseline. No noted sensory changes on examination. Normal coordination. No significant findings on examination. Not on AP/AC at home. Noted to have much improvement in symptoms after return from CT scan.   - NIHSS 0  - LKW 1500    Discussed with Radiology:  CTH: no acute intracranial abnormality  CTA H/N: no LVO or significant stenosis    Discussed with ED to see if symptoms were noted to be disabling or not, felt that symptoms mild and non-disabling. Additionally had much improvement in symptoms after return from CT scan. No noted abnormalities on examination without change in ambulatory function from baseline, with risks outweighing potential benefits of TNK.     Not an endovascular candidate since no LVO.     Pt presenting for evaluation of vertigo similar to what he has experienced before, in addition to subjective left facial numbness. No objective deficits noted on ED examination. He does have risk factors for stroke and will therefore recommend admission on stroke pathway for further evaluation, although suspect that symptoms may be related to a peripheral cause. ABCD2 score 4. Load with aspirin 325 mg once and Plavix 300 mg once, then start aspirin 81 mg daily and Plavix 75 mg daily tomorrow for now. MRI  brain. Permissive HTN up to 200 systolic. Stroke pathway.

## 2024-10-06 NOTE — ASSESSMENT & PLAN NOTE
Patient initially presented to the hospital due to dizziness described as vertigo around 3 PM today-NIHSS of 0  Neurology consulted in the ED and will admit under stroke pathway  ASA, Plavix loaded-will start daily dosing tomorrow  Echo and MRI ordered  PT/OT  Lipitor  Permissive hypertension up to 200 systolic per neurology

## 2024-10-06 NOTE — ED PROVIDER NOTES
Final diagnoses:   Stroke-like symptom     ED Disposition       ED Disposition   Admit    Condition   Stable    Date/Time   Sun Oct 6, 2024  6:01 PM    Comment   Case was discussed with JEREMIAH and the patient's admission status was agreed to be Admission Status: observation status to the service of Dr. Avalos .               Assessment & Plan       Medical Decision Making  69-year-old male with vertigo and associated left facial paresthesias, given associated facial symptoms concern for central cause.  Symptoms are acute and began within the past hour and a half, will call stroke alert at this time discussed the case with neurology.  His NIH at this time is 0.  His gait has been unchanged since onset of his symptoms today and so this episode does not appear to be acutely disabling.    Amount and/or Complexity of Data Reviewed  Labs: ordered.  Radiology: ordered.    Risk  OTC drugs.  Prescription drug management.  Decision regarding hospitalization.             Medications   iohexol (OMNIPAQUE) 350 MG/ML injection (MULTI-DOSE) 85 mL (85 mL Intravenous Given 10/6/24 1645)   aspirin chewable tablet 324 mg (324 mg Oral Given 10/6/24 1728)   clopidogrel (PLAVIX) tablet 300 mg (300 mg Oral Given 10/6/24 1729)       ED Risk Strat Scores                                              History of Present Illness       Chief Complaint   Patient presents with    Dizziness     Pt began with dizziness, blurry vision, chest pain and nausea that began an hour ago. Pt has hx of vertigo. Pt reports less sensation on the left side      Blurred Vision    Chest Pain       Past Medical History:   Diagnosis Date    Diabetes mellitus (HCC)     Prostate cancer (HCC)       Past Surgical History:   Procedure Laterality Date    PROSTATE SURGERY      TOE AMPUTATION Left 10/31/2023    Procedure: AMPUTATION OF LEFT HALLUX;  Surgeon: Anders Kohli DPM;  Location: [facility];  Service: Podiatry      History reviewed. No pertinent family history.    Social History     Tobacco Use    Smoking status: Former    Smokeless tobacco: Never   Vaping Use    Vaping status: Never Used   Substance Use Topics    Alcohol use: Never    Drug use: No      E-Cigarette/Vaping    E-Cigarette Use Never User       E-Cigarette/Vaping Substances      I have reviewed and agree with the history as documented.     69-year-old male presenting to the emergency department for evaluation of vertigo.  Patient reports sudden onset sensation of room spinning.  This happened around 3 PM.  He states that he has had vertigo in the past before however this episode was associated with some chest tightness as well as a sensation of left upper and lower facial paresthesia.  He still able to move his face.  He has some blurry vision but no double vision, no visual field cut change.  He states that he has chronic ambulatory dysfunction but has no new change in gait balance or coordination.  He has no word finding difficulty he has had no recent fevers chills or cough.        Review of Systems   Constitutional:  Negative for appetite change, chills, fatigue and fever.   HENT:  Negative for sneezing and sore throat.    Eyes:  Negative for visual disturbance.   Respiratory:  Negative for cough, choking, chest tightness, shortness of breath and wheezing.    Cardiovascular:  Negative for chest pain and palpitations.   Gastrointestinal:  Negative for abdominal pain, constipation, diarrhea, nausea and vomiting.   Genitourinary:  Negative for difficulty urinating and dysuria.   Neurological:  Positive for dizziness and numbness. Negative for weakness, light-headedness and headaches.   All other systems reviewed and are negative.          Objective       ED Triage Vitals [10/06/24 1618]   Temperature Pulse Blood Pressure Respirations SpO2 Patient Position - Orthostatic VS   97.6 °F (36.4 °C) 105 127/78 18 98 % Sitting      Temp Source Heart Rate Source BP Location FiO2 (%) Pain Score    Temporal Monitor Left  arm -- --      Vitals      Date and Time Temp Pulse SpO2 Resp BP Pain Score FACES Pain Rating User   10/06/24 1730 -- 79 98 % 20 133/75 -- --    10/06/24 1715 -- 86 94 % 20 148/84 -- -- BK   10/06/24 1700 -- 84 95 % 14 134/82 -- --    10/06/24 1645 -- 91 98 % 20 145/83 -- --    10/06/24 1630 -- 98 Simultaneous filing. User may not have seen previous data. 96 % Simultaneous filing. User may not have seen previous data. 20 Simultaneous filing. User may not have seen previous data. 150/92 Simultaneous filing. User may not have seen previous data. -- --    10/06/24 1618 97.6 °F (36.4 °C) 105 98 % 18 127/78 -- -- BS            Physical Exam  Vitals and nursing note reviewed.   Constitutional:       General: He is not in acute distress.     Appearance: He is well-developed. He is not diaphoretic.   HENT:      Head: Normocephalic and atraumatic.   Eyes:      Pupils: Pupils are equal, round, and reactive to light.   Neck:      Vascular: No JVD.      Trachea: No tracheal deviation.   Cardiovascular:      Rate and Rhythm: Normal rate and regular rhythm.      Heart sounds: Normal heart sounds. No murmur heard.     No friction rub. No gallop.   Pulmonary:      Effort: Pulmonary effort is normal. No respiratory distress.      Breath sounds: Normal breath sounds. No wheezing or rales.   Abdominal:      General: Bowel sounds are normal. There is no distension.      Palpations: Abdomen is soft.      Tenderness: There is no abdominal tenderness. There is no guarding or rebound.   Skin:     General: Skin is warm and dry.      Coloration: Skin is not pale.   Neurological:      Mental Status: He is alert and oriented to person, place, and time.      GCS: GCS eye subscore is 4. GCS verbal subscore is 5. GCS motor subscore is 6.      Cranial Nerves: No cranial nerve deficit.      Motor: No abnormal muscle tone.      Comments: Patient was ambulated, no notable change in ambulatory pattern per patient.   Psychiatric:          Behavior: Behavior normal.         Results Reviewed       Procedure Component Value Units Date/Time    HS Troponin 0hr (reflex protocol) [[id number]]  (Normal) Collected: 10/06/24 1635    Lab Status: Final result Specimen: Blood from Arm, Left Updated: 10/06/24 1714     hs TnI 0hr 5 ng/L     HS Troponin I 2hr [[id number]]     Lab Status: No result Specimen: Blood     Protime-INR [[id number]]  (Normal) Collected: 10/06/24 1635    Lab Status: Final result Specimen: Blood from Arm, Left Updated: 10/06/24 1702     Protime 13.0 seconds      INR 0.91    Narrative:      INR Therapeutic Range    Indication                                             INR Range      Atrial Fibrillation                                               2.0-3.0  Hypercoagulable State                                    2.0.2.3  Left Ventricular Asist Device                            2.0-3.0  Mechanical Heart Valve                                  -    Aortic(with afib, MI, embolism, HF, LA enlargement,    and/or coagulopathy)                                     2.0-3.0 (2.5-3.5)     Mitral                                                             2.5-3.5  Prosthetic/Bioprosthetic Heart Valve               2.0-3.0  Venous thromboembolism (VTE: VT, PE        2.0-3.0    APTT [[id number]]  (Normal) Collected: 10/06/24 1635    Lab Status: Final result Specimen: Blood from Arm, Left Updated: 10/06/24 1702     PTT 26 seconds     Basic metabolic panel [[id number]]  (Abnormal) Collected: 10/06/24 1635    Lab Status: Final result Specimen: Blood from Arm, Left Updated: 10/06/24 1657     Sodium 142 mmol/L      Potassium 4.1 mmol/L      Chloride 111 mmol/L      CO2 22 mmol/L      ANION GAP 9 mmol/L      BUN 23 mg/dL      Creatinine 1.15 mg/dL      Glucose 110 mg/dL      Calcium 8.8 mg/dL      eGFR 64 ml/min/1.73sq m     Narrative:      National Kidney Disease Foundation guidelines for Chronic Kidney Disease (CKD):     Stage 1 with normal or high GFR (GFR > 90  mL/min/1.73 square meters)    Stage 2 Mild CKD (GFR = 60-89 mL/min/1.73 square meters)    Stage 3A Moderate CKD (GFR = 45-59 mL/min/1.73 square meters)    Stage 3B Moderate CKD (GFR = 30-44 mL/min/1.73 square meters)    Stage 4 Severe CKD (GFR = 15-29 mL/min/1.73 square meters)    Stage 5 End Stage CKD (GFR <15 mL/min/1.73 square meters)  Note: GFR calculation is accurate only with a steady state creatinine    CBC and Platelet [[id number]]  (Abnormal) Collected: 10/06/24 1635    Lab Status: Final result Specimen: Blood from Arm, Left Updated: 10/06/24 1644     WBC 11.18 Thousand/uL      RBC 5.16 Million/uL      Hemoglobin 15.5 g/dL      Hematocrit 46.1 %      MCV 89 fL      MCH 30.0 pg      MCHC 33.6 g/dL      RDW 13.2 %      Platelets 226 Thousands/uL      MPV 10.5 fL     Fingerstick Glucose (POCT) [[id number]]  (Normal) Collected: 10/06/24 1637    Lab Status: Final result Specimen: Blood Updated: 10/06/24 1639     POC Glucose 109 mg/dl             CTA stroke alert (head/neck)   Final Interpretation by Ambrose Schmitt MD (10/06 1702)      No large vessel occlusion in the head or neck.               Findings communicated to  Dennis Ayala at 4:54 p.m. 10/6/2024.      Workstation performed: [id number]         CT stroke alert brain   Final Interpretation by Ambrose Schmitt MD (10/06 1648)      No acute intracranial abnormality.      Findings were directly discussed with Dennis Ayala at approximately 4:48 p.m. 10/6/2024.      Workstation performed: [id number]             Procedures    ED Medication and Procedure Management   Prior to Admission Medications   Prescriptions Last Dose Informant Patient Reported? Taking?   Alcohol Swabs 70 % PADS   No No   Sig: May substitute brand based on insurance coverage. Check glucose TID.   Blood Glucose Monitoring Suppl (OneTouch Verio Reflect) w/Device KIT   No No   Sig: May substitute brand based on insurance coverage. Check glucose TID.   DULoxetine (CYMBALTA) 20 mg capsule   Self Yes No   Sig: Take 20 mg by mouth 2 (two) times a day   Patient not taking: Reported on 10/26/2023   Insulin Glargine Solostar (Lantus SoloStar) 100 UNIT/ML SOPN   No No   Sig: Inject 0.38 mL (38 Units total) under the skin daily at bedtime   Insulin Pen Needle (BD Pen Needle Maricarmen 2nd Gen) 32G X 4 MM MISC   No No   Sig: For use with insulin pen. Pharmacy may dispense brand covered by insurance.   Insulin Pen Needle (BD Pen Needle Maricarmen 2nd Gen) 32G X 4 MM MISC   No No   Sig: For use with insulin pen. Pharmacy may dispense brand covered by insurance.   OneTouch Delica Lancets 33G MISC   No No   Sig: May substitute brand based on insurance coverage. Check glucose TID.   acetaminophen (TYLENOL) 325 mg tablet   No No   Sig: Take 2 tablets (650 mg total) by mouth every 6 (six) hours as needed for mild pain   atorvastatin (LIPITOR) 10 mg tablet  Self Yes No   Sig: Take 10 mg by mouth daily   Patient not taking: Reported on 10/26/2023   famotidine (PEPCID) 20 mg tablet  Self No No   Sig: Take 1 tablet by mouth 2 (two) times a day as needed for heartburn   Patient not taking: Reported on 6/16/2019   glucose blood (OneTouch Verio) test strip   No No   Sig: May substitute brand based on insurance coverage. Check glucose TID.   insulin aspart (NovoLOG FlexPen) 100 UNIT/ML injection pen   No No   Sig: Inject 8 Units under the skin 3 (three) times a day with meals   metFORMIN (GLUCOPHAGE) 500 mg tablet  Self Yes No   Sig: Take 500 mg by mouth 2 (two) times a day with meals   Patient not taking: Reported on 10/26/2023   pregabalin (LYRICA) 75 mg capsule  Self Yes No   Sig: Take 75 mg by mouth 2 (two) times a day   Patient not taking: Reported on 10/26/2023   traMADol (ULTRAM) 50 mg tablet  Self No No   Sig: Take 1 tablet (50 mg total) by mouth every 6 (six) hours as needed for moderate pain   Patient not taking: Reported on 2/24/2020      Facility-Administered Medications: None     Patient's Medications   Discharge  Prescriptions    No medications on file     No discharge procedures on file.  ED SEPSIS DOCUMENTATION   Time reflects when diagnosis was documented in both MDM as applicable and the Disposition within this note       Time User Action Codes Description Comment    10/6/2024  4:34 PM Cachorro Hansen Add [R29.90] Stroke-like symptom                  Cachorro Hansen MD  10/06/24 6595

## 2024-10-06 NOTE — ED NOTES
"Pt asking to speak with [facility] provider. RN asked if there was anything she could help with, pt states it's \"personal\" and only wants to speak with the provider. Pt made aware that provider will not be able to speak with him right away and pt verbalized understanding.      Isabel Moreno RN  10/06/24 1922    "

## 2024-10-06 NOTE — ASSESSMENT & PLAN NOTE
Lab Results   Component Value Date    HGBA1C 14.7 (H) 10/26/2023   History of severely uncontrolled diabetes-currently taking Lantus 36 units daily  Does not want to be on any other medications outpatient  Will start Lantus 30 units at night and SSI, carb controlled diet

## 2024-10-06 NOTE — H&P
H&P - Hospitalist   Name: Rashaad Centeno 69 y.o. male I MRN: [id number]  Unit/Bed#: ED 22 I Date of Admission: 10/6/2024   Date of Service: 10/6/2024 I Hospital Day: 0     Assessment & Plan  Diabetes mellitus (HCC)  Lab Results   Component Value Date    HGBA1C 14.7 (H) 10/26/2023   History of severely uncontrolled diabetes-currently taking Lantus 36 units daily  Does not want to be on any other medications outpatient  Will start Lantus 30 units at night and SSI, carb controlled diet  Stroke-like symptom  Patient initially presented to the hospital due to dizziness described as vertigo around 3 PM today-NIHSS of 0  Neurology consulted in the ED and will admit under stroke pathway  ASA, Plavix loaded-will start daily dosing tomorrow  Echo and MRI ordered  PT/OT  Lipitor  Permissive hypertension up to 200 systolic per neurology    VTE Pharmacologic Prophylaxis:   Moderate Risk (Score 3-4) - Pharmacological DVT Prophylaxis Ordered: enoxaparin (Lovenox).  Code Status: Level 3 - DNAR and DNI   Discussion with family: Updated  (wife and son) at bedside.    Anticipated Length of Stay: Patient will be admitted on an inpatient basis with an anticipated length of stay of greater than 2 midnights secondary to CVA.    History of Present Illness   Chief Complaint: Ammy Centeno is a 69 y.o. male with a PMH of uncontrolled diabetes who presents with dizziness.  Patient states that around 3 PM today he suddenly became dizzy.  CTA in the ED was negative as well as CT head.  Neurology was consulted in ED and recommended to admit under stroke pathway.  Patient had Plavix and aspirin load.  Significant labs include WBC of 11.18, creatinine 1.15.  Patient seen and examined and actively has no complaints.    Review of Systems   Constitutional:  Negative for chills and fever.   HENT:  Negative for ear pain and sore throat.    Eyes:  Negative for pain and visual disturbance.   Respiratory:  Negative for cough  and shortness of breath.    Cardiovascular:  Negative for chest pain and palpitations.   Gastrointestinal:  Negative for abdominal pain and vomiting.   Genitourinary:  Negative for dysuria and hematuria.   Musculoskeletal:  Negative for arthralgias and back pain.   Skin:  Negative for color change and rash.   Neurological:  Negative for seizures and syncope.   All other systems reviewed and are negative.      Historical Information   Past Medical History:   Diagnosis Date    Diabetes mellitus (HCC)     Prostate cancer (HCC)      Past Surgical History:   Procedure Laterality Date    PROSTATE SURGERY      TOE AMPUTATION Left 10/31/2023    Procedure: AMPUTATION OF LEFT HALLUX;  Surgeon: Anders Kohli DPM;  Location: [facility] OR;  Service: Podiatry     Social History     Tobacco Use    Smoking status: Former    Smokeless tobacco: Never   Vaping Use    Vaping status: Never Used   Substance and Sexual Activity    Alcohol use: Never    Drug use: No    Sexual activity: Not on file     E-Cigarette/Vaping    E-Cigarette Use Never User      E-Cigarette/Vaping Substances     Family history non-contributory  Social History:  Marital Status: /Civil Union   Occupation: Unknown  Patient Pre-hospital Living Situation: Home  Patient Pre-hospital Level of Mobility: walks  Patient Pre-hospital Diet Restrictions: N/A    Objective :  Temp:  [97.6 °F (36.4 °C)] 97.6 °F (36.4 °C)  HR:  [] 79  BP: (127-150)/(75-92) 133/75  Resp:  [14-20] 20  SpO2:  [94 %-98 %] 98 %  O2 Device: None (Room air)    Physical Exam  Vitals and nursing note reviewed.   Constitutional:       General: He is not in acute distress.     Appearance: He is well-developed.   HENT:      Head: Normocephalic and atraumatic.   Eyes:      Conjunctiva/sclera: Conjunctivae normal.   Cardiovascular:      Rate and Rhythm: Normal rate and regular rhythm.      Heart sounds: No murmur heard.  Pulmonary:      Effort: Pulmonary effort is normal. No respiratory distress.       Breath sounds: Normal breath sounds.   Abdominal:      Palpations: Abdomen is soft.      Tenderness: There is no abdominal tenderness.   Musculoskeletal:         General: No swelling.      Cervical back: Neck supple.   Skin:     General: Skin is warm and dry.      Capillary Refill: Capillary refill takes less than 2 seconds.   Neurological:      Mental Status: He is alert.   Psychiatric:         Mood and Affect: Mood normal.         Lines/Drains:            Lab Results: I have reviewed the following results:  Results from last 7 days   Lab Units 10/06/24  1635   WBC Thousand/uL 11.18*   HEMOGLOBIN g/dL 15.5   HEMATOCRIT % 46.1   PLATELETS Thousands/uL 226     Results from last 7 days   Lab Units 10/06/24  1635   SODIUM mmol/L 142   POTASSIUM mmol/L 4.1   CHLORIDE mmol/L 111*   CO2 mmol/L 22   BUN mg/dL 23   CREATININE mg/dL 1.15   ANION GAP mmol/L 9   CALCIUM mg/dL 8.8   GLUCOSE RANDOM mg/dL 110     Results from last 7 days   Lab Units 10/06/24  1635   INR  0.91     Results from last 7 days   Lab Units 10/06/24  1637   POC GLUCOSE mg/dl 109     Lab Results   Component Value Date    HGBA1C 14.7 (H) 10/26/2023    HGBA1C 6.0 (H) 10/25/2019    HGBA1C 8.2 (H) 07/24/2019           Imaging Results Review: No pertinent imaging studies reviewed.  Other Study Results Review: No additional pertinent studies reviewed.    Administrative Statements   I have spent a total time of 35 minutes in caring for this patient on the day of the visit/encounter including Counseling / Coordination of care, Documenting in the medical record, Reviewing / ordering tests, medicine, procedures  , and Obtaining or reviewing history  .    ** Please Note: This note has been constructed using a voice recognition system. **

## 2024-10-07 ENCOUNTER — APPOINTMENT (OUTPATIENT)
Dept: MRI IMAGING | Facility: HOSPITAL | Age: 69
End: 2024-10-07
Payer: COMMERCIAL

## 2024-10-07 ENCOUNTER — APPOINTMENT (OUTPATIENT)
Dept: NON INVASIVE DIAGNOSTICS | Facility: HOSPITAL | Age: 69
End: 2024-10-07
Payer: COMMERCIAL

## 2024-10-07 LAB
ANION GAP SERPL CALCULATED.3IONS-SCNC: 7 MMOL/L (ref 4–13)
AORTIC ROOT: 3.4 CM
APICAL FOUR CHAMBER EJECTION FRACTION: 65 %
ASCENDING AORTA: 2.7 CM
BSA FOR ECHO PROCEDURE: 2.06 M2
BUN SERPL-MCNC: 20 MG/DL (ref 5–25)
CALCIUM SERPL-MCNC: 7.9 MG/DL (ref 8.4–10.2)
CHLORIDE SERPL-SCNC: 110 MMOL/L (ref 96–108)
CHOLEST SERPL-MCNC: 148 MG/DL
CO2 SERPL-SCNC: 24 MMOL/L (ref 21–32)
CREAT SERPL-MCNC: 0.8 MG/DL (ref 0.6–1.3)
E WAVE DECELERATION TIME: 190 MS
E/A RATIO: 0.8
ERYTHROCYTE [DISTWIDTH] IN BLOOD BY AUTOMATED COUNT: 13.2 % (ref 11.6–15.1)
EST. AVERAGE GLUCOSE BLD GHB EST-MCNC: 166 MG/DL
FRACTIONAL SHORTENING: 36 (ref 28–44)
GFR SERPL CREATININE-BSD FRML MDRD: 91 ML/MIN/1.73SQ M
GLUCOSE SERPL-MCNC: 138 MG/DL (ref 65–140)
GLUCOSE SERPL-MCNC: 154 MG/DL (ref 65–140)
GLUCOSE SERPL-MCNC: 161 MG/DL (ref 65–140)
GLUCOSE SERPL-MCNC: 60 MG/DL (ref 65–140)
GLUCOSE SERPL-MCNC: 93 MG/DL (ref 65–140)
HBA1C MFR BLD: 7.4 %
HCT VFR BLD AUTO: 42 % (ref 36.5–49.3)
HDLC SERPL-MCNC: 42 MG/DL
HGB BLD-MCNC: 14.3 G/DL (ref 12–17)
INTERVENTRICULAR SEPTUM IN DIASTOLE (PARASTERNAL SHORT AXIS VIEW): 1.2 CM
INTERVENTRICULAR SEPTUM: 1.2 CM (ref 0.6–1.1)
LAAS-AP2: 24 CM2
LAAS-AP4: 14.3 CM2
LDLC SERPL CALC-MCNC: 92 MG/DL (ref 0–100)
LEFT ATRIUM SIZE: 3.5 CM
LEFT ATRIUM VOLUME (MOD BIPLANE): 56 ML
LEFT ATRIUM VOLUME INDEX (MOD BIPLANE): 27.2 ML/M2
LEFT INTERNAL DIMENSION IN SYSTOLE: 2.9 CM (ref 2.1–4)
LEFT VENTRICULAR INTERNAL DIMENSION IN DIASTOLE: 4.5 CM (ref 3.5–6)
LEFT VENTRICULAR POSTERIOR WALL IN END DIASTOLE: 0.8 CM
LEFT VENTRICULAR STROKE VOLUME: 59 ML
LVSV (TEICH): 59 ML
MCH RBC QN AUTO: 30.4 PG (ref 26.8–34.3)
MCHC RBC AUTO-ENTMCNC: 34 G/DL (ref 31.4–37.4)
MCV RBC AUTO: 89 FL (ref 82–98)
MV E'TISSUE VEL-LAT: 13 CM/S
MV E'TISSUE VEL-SEP: 8 CM/S
MV PEAK A VEL: 1.11 M/S
MV PEAK E VEL: 89 CM/S
MV STENOSIS PRESSURE HALF TIME: 55 MS
MV VALVE AREA P 1/2 METHOD: 4
PLATELET # BLD AUTO: 183 THOUSANDS/UL (ref 149–390)
PMV BLD AUTO: 10.9 FL (ref 8.9–12.7)
POTASSIUM SERPL-SCNC: 3.5 MMOL/L (ref 3.5–5.3)
RBC # BLD AUTO: 4.71 MILLION/UL (ref 3.88–5.62)
RIGHT ATRIUM AREA SYSTOLE A4C: 15.7 CM2
RIGHT VENTRICLE ID DIMENSION: 3 CM
SL CV LEFT ATRIUM LENGTH A2C: 5.6 CM
SL CV PED ECHO LEFT VENTRICLE DIASTOLIC VOLUME (MOD BIPLANE) 2D: 92 ML
SL CV PED ECHO LEFT VENTRICLE SYSTOLIC VOLUME (MOD BIPLANE) 2D: 33 ML
SODIUM SERPL-SCNC: 141 MMOL/L (ref 135–147)
TRICUSPID ANNULAR PLANE SYSTOLIC EXCURSION: 2.8 CM
TRIGL SERPL-MCNC: 71 MG/DL
WBC # BLD AUTO: 7.5 THOUSAND/UL (ref 4.31–10.16)

## 2024-10-07 PROCEDURE — 97166 OT EVAL MOD COMPLEX 45 MIN: CPT

## 2024-10-07 PROCEDURE — 85027 COMPLETE CBC AUTOMATED: CPT

## 2024-10-07 PROCEDURE — 83036 HEMOGLOBIN GLYCOSYLATED A1C: CPT

## 2024-10-07 PROCEDURE — 70551 MRI BRAIN STEM W/O DYE: CPT

## 2024-10-07 PROCEDURE — 82948 REAGENT STRIP/BLOOD GLUCOSE: CPT

## 2024-10-07 PROCEDURE — 93306 TTE W/DOPPLER COMPLETE: CPT

## 2024-10-07 PROCEDURE — 80061 LIPID PANEL: CPT

## 2024-10-07 PROCEDURE — 99232 SBSQ HOSP IP/OBS MODERATE 35: CPT | Performed by: INTERNAL MEDICINE

## 2024-10-07 PROCEDURE — 97163 PT EVAL HIGH COMPLEX 45 MIN: CPT

## 2024-10-07 PROCEDURE — 80048 BASIC METABOLIC PNL TOTAL CA: CPT

## 2024-10-07 PROCEDURE — 93306 TTE W/DOPPLER COMPLETE: CPT | Performed by: INTERNAL MEDICINE

## 2024-10-07 PROCEDURE — 99204 OFFICE O/P NEW MOD 45 MIN: CPT | Performed by: PSYCHIATRY & NEUROLOGY

## 2024-10-07 RX ADMIN — INSULIN LISPRO 1 UNITS: 100 INJECTION, SOLUTION INTRAVENOUS; SUBCUTANEOUS at 11:59

## 2024-10-07 RX ADMIN — CLOPIDOGREL 75 MG: 75 TABLET ORAL at 09:23

## 2024-10-07 RX ADMIN — INSULIN GLARGINE 30 UNITS: 100 INJECTION, SOLUTION SUBCUTANEOUS at 21:07

## 2024-10-07 RX ADMIN — ASPIRIN 81 MG: 81 TABLET, CHEWABLE ORAL at 09:23

## 2024-10-07 RX ADMIN — ATORVASTATIN CALCIUM 40 MG: 40 TABLET, FILM COATED ORAL at 17:57

## 2024-10-07 RX ADMIN — ENOXAPARIN SODIUM 40 MG: 40 INJECTION SUBCUTANEOUS at 09:23

## 2024-10-07 RX ADMIN — INSULIN LISPRO 1 UNITS: 100 INJECTION, SOLUTION INTRAVENOUS; SUBCUTANEOUS at 21:08

## 2024-10-07 NOTE — ASSESSMENT & PLAN NOTE
Lab Results   Component Value Date    HGBA1C 7.4 (H) 10/07/2024   History of severely uncontrolled diabetes-currently taking Lantus 36 units daily  Has since significantly improved  A1c 7.4  Continue Lantus 30 units nightly  Sliding scale insulin  Monitor blood glucose

## 2024-10-07 NOTE — PLAN OF CARE
Problem: Neurological Deficit  Goal: Neurological status is stable or improving  Description: Interventions:  - Monitor and assess patient's level of consciousness, motor function, sensory function, and level of assistance needed for ADLs.   - Monitor and report changes from baseline. Collaborate with interdisciplinary team to initiate plan and implement interventions as ordered.   - Provide and maintain a safe environment.  - Consider seizure precautions.  - Consider fall precautions.  - Consider aspiration precautions.  - Consider bleeding precautions.  Outcome: Adequate for Discharge     Problem: Activity Intolerance/Impaired Mobility  Goal: Mobility/activity is maintained at optimum level for patient  Description: Interventions:  - Assess and monitor patient  barriers to mobility and need for assistive/adaptive devices.  - Assess patient's emotional response to limitations.  - Collaborate with interdisciplinary team and initiate plans and interventions as ordered.  - Encourage independent activity per ability.  - Maintain proper body alignment.  - Perform active/passive rom as tolerated/ordered.  - Plan activities to conserve energy.  - Turn patient as appropriate  Outcome: Adequate for Discharge     Problem: Communication Impairment  Goal: Ability to express needs and understand communication  Description: Assess patient's communication skills and ability to understand information.  Patient will demonstrate use of effective communication techniques, alternative methods of communication and understanding even if not able to speak.     - Encourage communication and provide alternate methods of communication as needed.  - Collaborate with case management/ for discharge needs.  - Include patient/family/caregiver in decisions related to communication.  Outcome: Adequate for Discharge     Problem: Potential for Aspiration  Goal: Non-ventilated patient's risk of aspiration is minimized  Description:  Assess and monitor vital signs, respiratory status, and labs (WBC).  Monitor for signs of aspiration (tachypnea, cough, rales, wheezing, cyanosis, fever).    - Assess and monitor patient's ability to swallow.  - Place patient up in chair to eat if possible.  - HOB up at 90 degrees to eat if unable to get patient up into chair.  - Supervise patient during oral intake.   - Instruct patient/ family to take small bites.  - Instruct patient/ family to take small single sips when taking liquids.  - Follow patient-specific strategies generated by speech pathologist.  Outcome: Adequate for Discharge  Goal: Ventilated patient's risk of aspiration is minimized  Description: Assess and monitor vital signs, respiratory status, airway cuff pressure, and labs (WBC).  Monitor for signs of aspiration (tachypnea, cough, rales, wheezing, cyanosis, fever).    - Elevate head of bed 30 degrees if patient has tube feeding.  - Monitor tube feeding.  Outcome: Adequate for Discharge     Problem: Nutrition  Goal: Nutrition/Hydration status is improving  Description: Monitor and assess patient's nutrition/hydration status for malnutrition (ex- brittle hair, bruises, dry skin, pale skin and conjunctiva, muscle wasting, smooth red tongue, and disorientation). Collaborate with interdisciplinary team and initiate plan and interventions as ordered.  Monitor patient's weight and dietary intake as ordered or per policy. Utilize nutrition screening tool and intervene per policy. Determine patient's food preferences and provide high-protein, high-caloric foods as appropriate.     - Assist patient with eating.  - Allow adequate time for meals.  - Encourage patient to take dietary supplement as ordered.  - Collaborate with clinical nutritionist.  - Include patient/family/caregiver in decisions related to nutrition.  Outcome: Adequate for Discharge     Problem: PAIN - ADULT  Goal: Verbalizes/displays adequate comfort level or baseline comfort  level  Description: Interventions:  - Encourage patient to monitor pain and request assistance  - Assess pain using appropriate pain scale  - Administer analgesics based on type and severity of pain and evaluate response  - Implement non-pharmacological measures as appropriate and evaluate response  - Consider cultural and social influences on pain and pain management  - Notify physician/advanced practitioner if interventions unsuccessful or patient reports new pain  Outcome: Adequate for Discharge     Problem: INFECTION - ADULT  Goal: Absence or prevention of progression during hospitalization  Description: INTERVENTIONS:  - Assess and monitor for signs and symptoms of infection  - Monitor lab/diagnostic results  - Monitor all insertion sites, i.e. indwelling lines, tubes, and drains  - Monitor endotracheal if appropriate and nasal secretions for changes in amount and color  - [place] appropriate cooling/warming therapies per order  - Administer medications as ordered  - Instruct and encourage patient and family to use good hand hygiene technique  - Identify and instruct in appropriate isolation precautions for identified infection/condition  Outcome: Adequate for Discharge  Goal: Absence of fever/infection during neutropenic period  Description: INTERVENTIONS:  - Monitor WBC    Outcome: Adequate for Discharge     Problem: SAFETY ADULT  Goal: Maintain or return to baseline ADL function  Description: INTERVENTIONS:  -  Assess patient's ability to carry out ADLs; assess patient's baseline for ADL function and identify physical deficits which impact ability to perform ADLs (bathing, care of mouth/teeth, toileting, grooming, dressing, etc.)  - Assess/evaluate cause of self-care deficits   - Assess range of motion  - Assess patient's mobility; develop plan if impaired  - Assess patient's need for assistive devices and provide as appropriate  - Encourage maximum independence but intervene and supervise when  necessary  - Involve family in performance of ADLs  - Assess for home care needs following discharge   - Consider OT consult to assist with ADL evaluation and planning for discharge  - Provide patient education as appropriate  Outcome: Adequate for Discharge     Problem: DISCHARGE PLANNING  Goal: Discharge to home or other facility with appropriate resources  Description: INTERVENTIONS:  - Identify barriers to discharge w/patient and caregiver  - Arrange for needed discharge resources and transportation as appropriate  - Identify discharge learning needs (meds, wound care, etc.)  - Arrange for interpretive services to assist at discharge as needed  - Refer to Case Management Department for coordinating discharge planning if the patient needs post-hospital services based on physician/advanced practitioner order or complex needs related to functional status, cognitive ability, or social support system  Outcome: Adequate for Discharge     Problem: Knowledge Deficit  Goal: Patient/family/caregiver demonstrates understanding of disease process, treatment plan, medications, and discharge instructions  Description: Complete learning assessment and assess knowledge base.  Interventions:  - Provide teaching at level of understanding  - Provide teaching via preferred learning methods  Outcome: Adequate for Discharge

## 2024-10-07 NOTE — ASSESSMENT & PLAN NOTE
69 year old with past history of uncontrolled diabetes, prostate cancer, peripheral neuropathy, chronic gait dysfunction who presents to [facility] on 10/6, as a stroke alert.  Please refer to stroke alert documentation by Dr. Ayala of neurology.  To summarize the patient presented to hospital for evaluation of dizziness, the patient stated that he began to have dizziness described vertigo around 1500 on day of presentation, he also has a history of vertigo and noted it felt similar to prior episodes.  He was also having left facial numbness after symptom onset, and mild blurring of vision.  He also had CP and dyspnea with this episode.   Blood pressure was 127/78 on arrival, the patient was not on AP or AC at home, symptoms did improve in the ED. NIH was reported to be 0.  He has recently noted hearing loss as well, along with ear ringing.    CT of head - No acute intracranial abnormality.   CTA of head and neck - No large vessel occlusion in the head or neck.     The patient was not a TNK candidate there was no LVO for endovascular procedure.  It was felt that his symptoms were more likely from a peripheral cause, but the patient was placed on the stroke pathway and loaded with aspirin and Plavix.  Neurology was asked evaluate the patient.     Labs/Testing:  BMP with chloride of 111 high  CBC with leukocytosis of 11.18 high  INR/APTT normal  Troponin normal  Glucose 109  LDL 92  A1c pending    CT of head - No acute intracranial abnormality.   CTA of head and neck - No large vessel occlusion in the head or neck.     Vertigo with subjective left facial numbness, blurring vision no other neurological deficits noted in the ED, there was associated CP and sob with this event, the patient does have risk factors and was admitted on the stroke pathway, although most likely peripheral causes as likely etiology, cannot exclude small stroke vs TIA with his risk factors.     - Stroke pathway, admitted by  medicine  MRI brain without contrast pending  Echo pending  Hemoglobin A1c  Loaded with aspirin and Plavix, continue Aspirin / Plavix for now  Atorvastatin 40 mg  Continue telemetry  PT/OT/ST  Frequent neuro checks. Continue to monitor and notify neurology with any changes.   If above work up negative consider ENT consultation  - Medical management and supportive care per primary team. Correction of any metabolic or infectious disturbances.    -Reviewed with attending plan of care per attending physician please see attestation for further details

## 2024-10-07 NOTE — ASSESSMENT & PLAN NOTE
Patient initially presented to the hospital due to dizziness described as vertigo around 3 PM today-NIHSS of 0  Neurology consulted in the ED and will admit under stroke pathway  Continue aspirin, Plavix, statin  PT/OT  Await MRI brain  Echo  Follow-up formal neurology consultation  Allow permissive hypertension for now

## 2024-10-07 NOTE — PHYSICAL THERAPY NOTE
"Physical Therapy Evaluation    Patient's Name: Rashaad Centeno    Admitting Diagnosis  Dizziness [R42]  Blurred vision [H53.8]  Chest pain [R07.9]  Stroke-like symptom [R29.90]    Problem List  Patient Active Problem List   Diagnosis    Diabetes mellitus (HCC)    Abdominal pain    Diabetic ulcer of left great toe (HCC)    Osteomyelitis (HCC)    Stroke-like symptom       Past Medical History  Past Medical History:   Diagnosis Date    Diabetes mellitus (HCC)     Prostate cancer (HCC)        Past Surgical History  Past Surgical History:   Procedure Laterality Date    PROSTATE SURGERY      TOE AMPUTATION Left 10/31/2023    Procedure: AMPUTATION OF LEFT HALLUX;  Surgeon: Anders Kohli DPM;  Location: [facility];  Service: Podiatry       Recent Imaging  CTA stroke alert (head/neck)   Final Result by Ambrose Schmitt MD (10/06 1702)      No large vessel occlusion in the head or neck.               Findings communicated to  Dennis Ayala at 4:54 p.m. 10/6/2024.      Workstation performed: [id number]         CT stroke alert brain   Final Result by Ambrose Schmitt MD (10/06 1648)      No acute intracranial abnormality.      Findings were directly discussed with Dennis Ayala at approximately 4:48 p.m. 10/6/2024.      Workstation performed: [id number]         MRI brain wo contrast    (Results Pending)       Recent Vital Signs  Vitals:    10/07/24 0800 10/07/24 0804 10/07/24 1100 10/07/24 1135   BP: 129/76  133/78 133/78   BP Location:   Left arm    Pulse: 68  76 76   Resp:   18    Temp:   97.7 °F (36.5 °C) 97.7 °F (36.5 °C)   TempSrc:   Oral    SpO2:  98%  97%   Weight: 87.5 kg (193 lb)      Height: 5' 10\" (1.778 m)           10/07/24 1106   PT Last Visit   PT Visit Date 10/07/24   Note Type   Note type Evaluation   Pain Assessment   Pain Assessment Tool 0-10   Pain Score 5   Pain Location/Orientation Orientation: Right;Location: Knee   Pain Onset/Description Onset: Ongoing   Patient's Stated Pain Goal No pain " "  Restrictions/Precautions   Weight Bearing Precautions Per Order No   Other Precautions Telemetry;Fall Risk;Pain   Home Living   Type of Home House   Home Layout Two level;Stairs to enter with rails;Performs ADLs on one level;Able to live on main level with bedroom/bathroom  (FF if going through garage; has a loft but reports \"rarely going up there\")   Bathroom Shower/Tub Walk-in shower   Bathroom Toilet Standard   Bathroom Equipment Grab bars in shower;Shower chair;Hand-held shower   Bathroom Accessibility Accessible   Home Equipment Cane   Additional Comments Reports using walking stick when walking dog   Prior Function   Level of [place] Independent with ADLs;Independent with functional mobility;Independent with IADLS   Lives With Spouse;Other (Comment)  (grandson)   Receives Help From Family   IADLs Independent with driving;Independent with meal prep;Independent with medication management   Falls in the last 6 months 0  (reports fall history)   Vocational Retired  ([employer])   Comments Pt reports 2 incidents occurs last week, pt reports being able to catch himself when tripping over his shoes due to decrease foot clearence.   General   Family/Caregiver Present No   Cognition   Overall Cognitive Status   (Questionable)   Arousal/Participation Alert   Attention Attends with cues to redirect   Orientation Level Oriented X4   Memory   (forgetful)   Following Commands Follows multistep commands with increased time or repetition   Comments Pt agreeable to PT evaluation   RLE Assessment   RLE Assessment WFL  (4-/5)   LLE Assessment   LLE Assessment WFL  (4-/5, decreased dorsiflexion)   Vision-Basic Assessment   Current Vision Wears glasses only for reading   Visual History Cataracts;Corrective eye surgery   Coordination   Sensation X  (impaired in BLE ankles distally)   Bed Mobility   Additional Comments Pt OOB at start and end of session   Transfers   Sit to Stand   (CGA)   Additional items Assist x 1;Increased " time required;Verbal cues;Armrests   Stand to Sit   (CGA)   Additional items Assist x 1;Armrests;Increased time required;Verbal cues   Ambulation/Elevation   Gait pattern Wide ERIK;Decreased foot clearance;Short stride;Decreased hip extension;Decreased heel strike;Decreased toe off;Step through pattern   Gait Assistance 4  Minimal assist   Additional items Assist x 1;Verbal cues   Assistive Device None   Distance 100'   Balance   Static Sitting Good   Dynamic Sitting Fair +   Static Standing Fair   Dynamic Standing Fair -   Ambulatory Fair -   Activity Tolerance   Activity Tolerance Patient tolerated treatment well   Medical Staff Made Aware FLAKITO Taylor  (Pt seen as co-evaluation with OT due to co-morbdities, clinically unstable presentation, and present impairments)   Nurse Made Aware EMILIA Garcia   Assessment   Prognosis Good   Problem List Decreased strength;Decreased endurance;Impaired balance;Decreased mobility;Impaired sensation   Assessment Rashaad Centeno is a 69 y.o. male admitted to [facility] on 10/6/[facility]24 for Stroke-like symptom, diabetes mellitus. Pt  has a past medical history of Diabetes mellitus (HCC) and Prostate cancer (HCC).. Order placed for PT eval and tx. PT was consulted and pt was seen on 10/7/2024 for mobility assessment and d/c planning. Chart review and two person identifiers were completed.   Currently pt presents with decreased strength , decreased static standing balance, decreased dynamic standing balance , decreased gait speed, decreased step length , and decreased muscular endurance . Due to these impairments, they will require assistance to perform bed mobility, sit to stand , ambulation, stair negotiation, and transfers. Pt is currently functioning at a  contact guard assistance x1 level for transfers, contact guard assistance x1 level for ambulation with no assistive device. These activity limitations significantly impact their ability to participate in previous home and community roles  and responsibilities  and ambulation in home. The patient's [facility] Basic Mobility Inpatient Short Form Raw Score is 20. PT recommends level III minimum resource intensity. They will benefit from skilled therapy to to reduce the risk of falls, to allow for safe ambulation, and to maximize functional potential.   Barriers to Discharge Other (Comment);Inaccessible home environment  (decline in functional mobility)   Goals   Patient Goals to go home   STG Expiration Date 10/17/24   Short Term Goal #1 Within 10 days patient will complete: 1) Bed mobility skills with modified independent assistance to facilitate safe return to previous living environment 2) Functional transfers with modified independent assistance to facilitate safe return to previous living environment  3) Ambulation with least restrictive AD modified independent assistance without LOB and stable vitals for safe ambulation home/ community distances. 4) Stair training up/down flight 3 step/s with appropriate rail/s and modified independent assistance for safe access to previous living environment. 5) Improve balance grades to good to reduce risk of falls. 6)Improve LE strength grades by 1 to increase independence w/ transfers and gait.  7) PT for ongoing pt and family education; DME needs and D/C planning to promote highest level of function in least restrictive environment.   Plan   Treatment/Interventions Functional transfer training;LE strengthening/ROM;Elevations;Therapeutic exercise;Endurance training;Patient/family training;Equipment eval/education;Bed mobility;Gait training;Continued evaluation;OT   PT Frequency 1-2x/wk   Discharge Recommendation   Rehab Resource Intensity Level, PT III (Minimum Resource Intensity)   AM-PAC Basic Mobility Inpatient   Turning in Flat Bed Without Bedrails 4   Lying on Back to Sitting on Edge of Flat Bed Without Bedrails 4   Moving Bed to Chair 3   Standing Up From Chair Using Arms 3   Walk in Room 3   Climb 3-5  Stairs With Railing 3   Basic Mobility Inpatient Raw Score 20   Basic Mobility Standardized Score 43.99   [facility] Highest Level Of Mobility   -[facility] Goal 6: Walk 10 steps or more   -HLM Achieved 7: Walk 25 feet or more   End of Consult   Patient Position at End of Consult Bedside chair;All needs within reach       Recommendations                                                                                                              Pt will benefit from continued skilled IP PT to address the above mentioned impairments in order to maximize recovery and increase functional independence when completing mobility and ADLs. See flow sheet for goals and POC.     PT Evaluation Time: 4785-2390  Newton Batres, PT, DPT

## 2024-10-07 NOTE — PROGRESS NOTES
Progress Note - Hospitalist   Name: Rashaad Centeno 69 y.o. male I MRN: [id number]  Unit/Bed#: 2 E 258-01 I Date of Admission: 10/6/2024   Date of Service: 10/7/2024 I Hospital Day: 0    Assessment & Plan  Diabetes mellitus (HCC)  Lab Results   Component Value Date    HGBA1C 7.4 (H) 10/07/2024   History of severely uncontrolled diabetes-currently taking Lantus 36 units daily  Has since significantly improved  A1c 7.4  Continue Lantus 30 units nightly  Sliding scale insulin  Monitor blood glucose  Stroke-like symptom  Patient initially presented to the hospital due to dizziness described as vertigo around 3 PM today-NIHSS of 0  Neurology consulted in the ED and will admit under stroke pathway  Continue aspirin, Plavix, statin  PT/OT  Await MRI brain  Echo  Follow-up formal neurology consultation  Allow permissive hypertension for now    VTE Pharmacologic Prophylaxis:   Moderate Risk (Score 3-4) - Pharmacological DVT Prophylaxis Ordered: enoxaparin (Lovenox).    Mobility:   Basic Mobility Inpatient Raw Score: 24  JH-HLM Goal: 8: Walk 250 feet or more  JH-HLM Achieved: 4: Move to chair/commode  JH-HLM Goal achieved. Continue to encourage appropriate mobility.    Patient Centered Rounds: I performed bedside rounds with nursing staff today.   Discussions with Specialists or Other Care Team Provider: cm, nursing    Education and Discussions with Family / Patient: Patient declined call to .     Current Length of Stay: 0 day(s)  Current Patient Status: Observation   Certification Statement: The patient will continue to require additional inpatient hospital stay due to see below  Discharge Plan:  Pending stroke pathway.  Anticipate discharge in next 24 hours    Code Status: Level 3 - DNAR and DNI    Subjective   Denies chest pain, shortness of breath, cough or fevers, chills    Objective :  Temp:  [97.5 °F (36.4 °C)-98 °F (36.7 °C)] 97.5 °F (36.4 °C)  HR:  [] 68  BP: (120-151)/(71-93) 129/76  Resp:   [14-24] 18  SpO2:  [94 %-98 %] 98 %  O2 Device: None (Room air)    Body mass index is 27.69 kg/m².     Input and Output Summary (last 24 hours):     Intake/Output Summary (Last 24 hours) at 10/7/2024 1021  Last data filed at 10/6/2024 2200  Gross per 24 hour   Intake 360 ml   Output --   Net 360 ml       Physical Exam  Constitutional:       General: He is not in acute distress.     Appearance: He is well-developed. He is not diaphoretic.   HENT:      Head: Normocephalic and atraumatic.      Nose: Nose normal.      Mouth/Throat:      Pharynx: No oropharyngeal exudate.   Eyes:      General: No scleral icterus.     Extraocular Movements: EOM normal.      Conjunctiva/sclera: Conjunctivae normal.   Neck:      Vascular: No JVD.   Cardiovascular:      Rate and Rhythm: Normal rate and regular rhythm.      Heart sounds: Normal heart sounds. No murmur heard.     No friction rub. No gallop.   Pulmonary:      Effort: Pulmonary effort is normal. No respiratory distress.      Breath sounds: Normal breath sounds. No wheezing or rales.   Chest:      Chest wall: No tenderness.   Abdominal:      General: Bowel sounds are normal. There is no distension.      Palpations: Abdomen is soft.      Tenderness: There is no abdominal tenderness. There is no guarding.   Musculoskeletal:         General: No tenderness, deformity or edema. Normal range of motion.      Cervical back: Normal range of motion and neck supple.   Lymphadenopathy:      Cervical: No cervical adenopathy.   Skin:     General: Skin is warm and dry.      Findings: No erythema.   Neurological:      Mental Status: He is alert. Mental status is at baseline.      Cranial Nerves: No cranial nerve deficit.      Coordination: Coordination normal.      Deep Tendon Reflexes: Reflexes normal.   Psychiatric:         Mood and Affect: Mood and affect normal.           Lines/Drains:        Telemetry:  Telemetry Orders (From admission, onward)               24 Hour Telemetry Monitoring   Continuous x 24 Hours (Telem)        Question:  Reason for 24 Hour Telemetry  Answer:  TIA/Suspected CVA/ Confirmed CVA                     Telemetry Reviewed: Normal Sinus Rhythm  Indication for Continued Telemetry Use: Acute CVA               Lab Results: I have reviewed the following results:   Results from last 7 days   Lab Units 10/07/24  0448   WBC Thousand/uL 7.50   HEMOGLOBIN g/dL 14.3   HEMATOCRIT % 42.0   PLATELETS Thousands/uL 183     Results from last 7 days   Lab Units 10/07/24  0448   SODIUM mmol/L 141   POTASSIUM mmol/L 3.5   CHLORIDE mmol/L 110*   CO2 mmol/L 24   BUN mg/dL 20   CREATININE mg/dL 0.80   ANION GAP mmol/L 7   CALCIUM mg/dL 7.9*   GLUCOSE RANDOM mg/dL 60*     Results from last 7 days   Lab Units 10/06/24  1635   INR  0.91     Results from last 7 days   Lab Units 10/07/24  0756 10/06/24  2056 10/06/24  1637   POC GLUCOSE mg/dl 93 91 109     Results from last 7 days   Lab Units 10/07/24  0448   HEMOGLOBIN A1C % 7.4*           Recent Cultures (last 7 days):         Imaging Results Review: I personally reviewed the following image studies/reports in PACS and discussed pertinent findings with Radiology: chest xray. My interpretation of the radiology images/reports is:  .  Other Study Results Review: EKG was reviewed.     Last 24 Hours Medication List:     Current Facility-Administered Medications:     aspirin chewable tablet 81 mg, Daily    atorvastatin (LIPITOR) tablet 40 mg, QPM    clopidogrel (PLAVIX) tablet 75 mg, Daily    enoxaparin (LOVENOX) subcutaneous injection 40 mg, Daily    insulin glargine (LANTUS) subcutaneous injection 30 Units 0.3 mL, HS    insulin lispro (HumALOG/ADMELOG) 100 units/mL subcutaneous injection 1-5 Units, 4x Daily (AC & HS) **AND** Fingerstick Glucose (POCT), 4x Daily AC and at bedtime    Administrative Statements   Today, Patient Was Seen By: Daljit Tom MD  I have spent a total time of 30 minutes in caring for this patient on the day of the visit/encounter  including Diagnostic results.    **Please Note: This note may have been constructed using a voice recognition system.**

## 2024-10-07 NOTE — PLAN OF CARE
Problem: PHYSICAL THERAPY ADULT  Goal: Performs mobility at highest level of function for planned discharge setting.  See evaluation for individualized goals.  Description: Treatment/Interventions: Functional transfer training, LE strengthening/ROM, Elevations, Therapeutic exercise, Endurance training, Patient/family training, Equipment eval/education, Bed mobility, Gait training, Continued evaluation, OT          See flowsheet documentation for full assessment, interventions and recommendations.  Note: Prognosis: Good  Problem List: Decreased strength, Decreased endurance, Impaired balance, Decreased mobility, Impaired sensation  Assessment: Rashaad Centeno is a 69 y.o. male admitted to [facility] on 10/6/2024 for Stroke-like symptom, diabetes mellitus. Pt  has a past medical history of Diabetes mellitus (HCC) and Prostate cancer (HCC).. Order placed for PT eval and tx. PT was consulted and pt was seen on 10/7/2024 for mobility assessment and d/c planning. Chart review and two person identifiers were completed.   Currently pt presents with decreased strength , decreased static standing balance, decreased dynamic standing balance , decreased gait speed, decreased step length , and decreased muscular endurance . Due to these impairments, they will require assistance to perform bed mobility, sit to stand , ambulation, stair negotiation, and transfers. Pt is currently functioning at a  contact guard assistance x1 level for transfers, contact guard assistance x1 level for ambulation with no assistive device. These activity limitations significantly impact their ability to participate in previous home and community roles and responsibilities  and ambulation in home. The patient's [facility] Basic Mobility Inpatient Short Form Raw Score is 20. PT recommends level III minimum resource intensity. They will benefit from skilled therapy to to reduce the risk of falls, to allow for safe ambulation, and to maximize functional  potential.  Barriers to Discharge: Other (Comment), Inaccessible home environment (decline in functional mobility)     Rehab Resource Intensity Level, PT: III (Minimum Resource Intensity)    See flowsheet documentation for full assessment.

## 2024-10-07 NOTE — PLAN OF CARE
Problem: OCCUPATIONAL THERAPY ADULT  Goal: Performs self-care activities at highest level of function for planned discharge setting.  See evaluation for individualized goals.  Description: Treatment Interventions: ADL retraining, Visual perceptual retraining, Functional transfer training, Cognitive reorientation, Patient/family training, Equipment evaluation/education, Compensatory technique education, Continued evaluation, Cardiac education, Energy conservation, Activityengagement          See flowsheet documentation for full assessment, interventions and recommendations.   Note: Limitation: Decreased ADL status, Decreased UE strength, Decreased self-care trans, Decreased high-level ADLs, Visual deficit, Decreased cognition  Prognosis: Good  Assessment: Pt is a 69 y.o. male seen for OT evaluation s/p admit to [facility] on 10/6/2024 w/ Stroke-like symptom. Comorbidities affecting pt's functional performance at time of assessment include:DM, h/o osteomyelitis, and abdominal pain . Orders placed for OT evaluation and treatment.  Performed at least two patient identifiers during session including name and wristband. Personal factors affecting pt at time of IE include:steps to enter environment, difficulty performing ADLS, difficulty performing IADLS , financial barriers, health management , and environment. Prior to admission, pt reports Ind with ADLs, Ind with IADLs, and (+) driving.  Upon evaluation: Pt requires MI with UB ADLs, S with LB ADLs, CGA/min A with xfers and CGA/min A with functional mobility 2* the following deficits impacting occupational performance: weakness, decreased strength, decreased dynamic sit/ stand balance, decreased activity tolerance, decreased standing tolerance time for self care and functional mobility, impaired FMC, impaired sensation, impulsivity, decreased safety awareness, environmental deficits, visual deficits, decreased insight, decreased mobilty, and requiring external  assistance to complete transitional movements. Pt to benefit from continued skilled OT tx while in the hospital to address deficits as defined above and maximize level of functional independence w ADL's and functional mobility. Occupational Performance areas to address include: bathing/shower, toilet hygiene, dressing, medication management, health maintenance, functional mobility, community mobility, clothing management, and household maintenance. From OT standpoint, recommendation at time of d/c would be Level 3 (Min Resource Intensity).     Rehab Resource Intensity Level, OT: III (Minimum Resource Intensity)

## 2024-10-07 NOTE — OCCUPATIONAL THERAPY NOTE
"Occupational Therapy Evaluation      Rashaad Centeno    10/7/2024    Principal Problem:    Stroke-like symptom  Active Problems:    Diabetes mellitus (HCC)      Past Medical History:   Diagnosis Date    Diabetes mellitus (HCC)     Prostate cancer (HCC)        Past Surgical History:   Procedure Laterality Date    PROSTATE SURGERY      TOE AMPUTATION Left 10/31/2023    Procedure: AMPUTATION OF LEFT HALLUX;  Surgeon: Anders Kohli DPM;  Location: [facility];  Service: Podiatry       10/07/24 1048   OT Last Visit   OT Visit Date 10/07/24   Note Type   Note type Evaluation   Pain Assessment   Pain Assessment Tool 0-10   Pain Score 5   Pain Location/Orientation Orientation: Right;Location: Knee   Pain Onset/Description Onset: Ongoing;Frequency: Constant/Continuous   Restrictions/Precautions   Other Precautions Telemetry;Fall Risk;Pain   Home Living   Type of Home House   Home Layout Two level;Stairs to enter with rails;Performs ADLs on one level;Able to live on main level with bedroom/bathroom  (FF if going through garage; has a loft but reports \"rarely going up there\")   Bathroom Shower/Tub Walk-in shower   Bathroom Toilet Standard   Bathroom Equipment Grab bars in shower;Shower chair;Hand-held shower   Bathroom Accessibility Accessible   Home Equipment Cane  (Reports using walking stick when walking dog)   Prior Function   Level of [place] Independent with ADLs;Independent with functional mobility;Independent with IADLS   Lives With Spouse;Other (Comment)  (grandson)   Receives Help From Family   IADLs Independent with driving;Independent with medication management;Independent with meal prep  (cleaning and laundry with A)   Falls in the last 6 months 0  (reports fall history)   Vocational Retired  ([employer])   Lifestyle   Autonomy Pt reports PLOF was Ind with ADLs, IADLs, and (+) driving   Reciprocal Relationships spouse and grandchild   ADL   Eating Assistance 6  Modified independent   Eating Deficit Increased " time to complete   Grooming Assistance 6  Modified Independent   Grooming Deficit Increased time to complete   UB Bathing Assistance 6  Modified Independent   UB Bathing Deficit Increased time to complete   LB Bathing Assistance 5  Supervision/Setup   LB Bathing Deficit Increased time to complete;Supervision/safety   UB Dressing Assistance 6  Modified independent   UB Dressing Deficit Increased time to complete   LB Dressing Assistance 5  Supervision/Setup   LB Dressing Deficit Increased time to complete;Supervision/safety   Toileting Assistance  6  Modified independent   Toileting Deficit Increased time to complete   Functional Assistance 5  Supervision/Setup   Functional Deficit Increased time to complete;Supervision/safety;Setup   Bed Mobility   Additional Comments Pt oob to chair when OT arrived   Transfers   Sit to Stand 4  Minimal assistance  (CGA)   Additional items Assist x 1;Increased time required;Verbal cues;Armrests   Stand to Sit 4  Minimal assistance  (CGA)   Additional items Assist x 1;Armrests;Increased time required;Verbal cues   Toilet transfer 4  Minimal assistance  (CGA)   Additional items Assist x 1;Increased time required;Verbal cues;Standard toilet   Functional Mobility   Functional Mobility 4  Minimal assistance  (CGA)   Balance   Static Sitting Good   Dynamic Sitting Fair +   Static Standing Fair   Dynamic Standing Fair -   Ambulatory Fair -   Activity Tolerance   Activity Tolerance Patient tolerated treatment well   Medical Staff Made Aware Co-evaluation performed with PT [place] secondary to complex medical condition of patient, possible A of 2 required to achieve and maintain transitional movements, and pt's regression of functional status from baseline. PT/OT goals were addressed separately.   Vision-Basic Assessment   Current Vision Wears glasses only for reading   Visual History Cataracts  (reports recently having cataract sx)   Vision - Complex Assessment   Ocular Range of Motion  Intact   Additional Comments Pt demonstrated L peripheral field cut to about 45 degrees.   Psychosocial   Psychosocial (WDL) WDL   Perception   Inattention/Neglect Appears intact   Cognition   Overall Cognitive Status   (questionable: assessment needed)   Arousal/Participation Alert;Arousable   Attention Attends with cues to redirect   Orientation Level Oriented X4   Memory   (forgetful)   Following Commands Follows multistep commands with increased time or repetition   Comments Pt was agreeable to OT eval   Assessment   Limitation Decreased ADL status;Decreased UE strength;Decreased self-care trans;Decreased high-level ADLs;Visual deficit;Decreased cognition   Prognosis Good   Assessment Pt is a 69 y.o. male seen for OT evaluation s/p admit to [facility] on 10/6/2024 w/ Stroke-like symptom. Comorbidities affecting pt's functional performance at time of assessment include:DM, h/o osteomyelitis, and abdominal pain . Orders placed for OT evaluation and treatment.  Performed at least two patient identifiers during session including name and wristband. Personal factors affecting pt at time of IE include:steps to enter environment, difficulty performing ADLS, difficulty performing IADLS , financial barriers, health management , and environment. Prior to admission, pt reports Ind with ADLs, Ind with IADLs, and (+) driving.  Upon evaluation: Pt requires MI with UB ADLs, S with LB ADLs, CGA/min A with xfers and CGA/min A with functional mobility 2* the following deficits impacting occupational performance: weakness, decreased strength, decreased dynamic sit/ stand balance, decreased activity tolerance, decreased standing tolerance time for self care and functional mobility, impaired FMC, impaired sensation, impulsivity, decreased safety awareness, environmental deficits, visual deficits, decreased insight, decreased mobilty, and requiring external assistance to complete transitional movements. Pt to benefit from  continued skilled OT tx while in the hospital to address deficits as defined above and maximize level of functional independence w ADL's and functional mobility. Occupational Performance areas to address include: bathing/shower, toilet hygiene, dressing, medication management, health maintenance, functional mobility, community mobility, clothing management, and household maintenance. From OT standpoint, recommendation at time of d/c would be Level 3 (Min Resource Intensity).   Plan   Treatment Interventions ADL retraining;Visual perceptual retraining;Functional transfer training;Cognitive reorientation;Patient/family training;Equipment evaluation/education;Compensatory technique education;Continued evaluation;Cardiac education;Energy conservation;Activityengagement   Goal Expiration Date 10/20/24   OT Frequency 3-5x/wk   Discharge Recommendation   Rehab Resource Intensity Level, OT III (Minimum Resource Intensity)   AM-PAC Daily Activity Inpatient   Lower Body Dressing 3   Bathing 3   Toileting 4   Upper Body Dressing 4   Grooming 4   Eating 4   Daily Activity Raw Score 22   Daily Activity Standardized Score (Calc for Raw Score >=11) 47.1   AM-PAC Applied Cognition Inpatient   Following a Speech/Presentation 3   Understanding Ordinary Conversation 4   Taking Medications 4   Remembering Where Things Are Placed or Put Away 4   Remembering List of 4-5 Errands 4   Taking Care of Complicated Tasks 3   Applied Cognition Raw Score 22   Applied Cognition Standardized Score 47.83     Occupational therapy Goals: In 5-7 days    Patient will verbalize and demonstrate use of energy conservation/ deep breathing technique and work simplification skills during functional activity with no verbal cues.    Patient will verbalize and demonstrate good body mechanics and joint protection techniques during ADLs/ IADLs with no verbal cues     Patient will increase OOB/ sitting tolerance to 4-6 hours per day for increased participation in  self care and leisure tasks with no s/s of exertion.    Patient will identify s/s of exertion during ADL and functional mobility with no verbal cues.     Patient will verbalize/ demonstrate compensatory strategies to recover from exertion with no verbal cues.     Patient will increase standing tolerance time to 13-15 minutes with No UE support to complete sink level ADLs at modified independent level.    Patient will increase sitting tolerance at edge of bed to 30-45 minutes to complete UB ADLs at modified independent level.     Patient will demonstrate ability to safely perform LB ADLS with MI with long handled adaptive dressing equipment.    Patient/ Family will demonstrate competency with UE Home Exercise Program.       Claudia Samano MS OTR/L

## 2024-10-07 NOTE — CONSULTS
Consultation - Neurology   Name: Rashaad Centeno 69 y.o. male I MRN: [id number]  Unit/Bed#: 2 E 258-01 I Date of Admission: 10/6/2024   Date of Service: 10/7/2024 I Hospital Day: 0   Consult to Neurology  Consult performed by: Jorge Mc PA-C  Consult ordered by: Cachorro Hansen MD      Physician Requesting Evaluation: Daljit Tom MD   Reason for Evaluation / Principal Problem: Vertigo, stroke like symptoms.      Assessment & Plan  Stroke-like symptom  69 year old with past history of uncontrolled diabetes, prostate cancer, peripheral neuropathy, chronic gait dysfunction who presents to [facility] on 10/6, as a stroke alert.  Please refer to stroke alert documentation by Dr. Ayala of neurology.  To summarize the patient presented to hospital for evaluation of dizziness, the patient stated that he began to have dizziness described vertigo around 1500 on day of presentation, he also has a history of vertigo and noted it felt similar to prior episodes.  He was also having left facial numbness after symptom onset, and mild blurring of vision.  He also had CP and dyspnea with this episode.   Blood pressure was 127/78 on arrival, the patient was not on AP or AC at home, symptoms did improve in the ED. NIH was reported to be 0.  He has recently noted hearing loss as well, along with ear ringing.    CT of head - No acute intracranial abnormality.   CTA of head and neck - No large vessel occlusion in the head or neck.     The patient was not a TNK candidate there was no LVO for endovascular procedure.  It was felt that his symptoms were more likely from a peripheral cause, but the patient was placed on the stroke pathway and loaded with aspirin and Plavix.  Neurology was asked evaluate the patient.     Labs/Testing:  BMP with chloride of 111 high  CBC with leukocytosis of 11.18 high  INR/APTT normal  Troponin normal  Glucose 109  LDL 92  A1c pending    CT of head - No acute intracranial abnormality.    CTA of head and neck - No large vessel occlusion in the head or neck.     Vertigo with subjective left facial numbness, blurring vision no other neurological deficits noted in the ED, there was associated CP and sob with this event, the patient does have risk factors and was admitted on the stroke pathway, although most likely peripheral causes as likely etiology, cannot exclude small stroke vs TIA with his risk factors.     - Stroke pathway, admitted by medicine  MRI brain without contrast pending  Echo pending  Hemoglobin A1c  Loaded with aspirin and Plavix, continue Aspirin / Plavix for now  Atorvastatin 40 mg  Continue telemetry  PT/OT/ST  Frequent neuro checks. Continue to monitor and notify neurology with any changes.   If above work up negative consider ENT consultation  - Medical management and supportive care per primary team. Correction of any metabolic or infectious disturbances.    -Reviewed with attending plan of care per attending physician please see attestation for further details    Recommendations for outpatient neurological follow up have yet to be determined.    History of Present Illness   Rashaad Centeno is a 69 y.o. with past history of uncontrolled diabetes, prostate cancer, peripheral neuropathy, chronic gait dysfunction who presents to [facility] on 10/6, as a stroke alert.  Please refer to stroke alert documentation by Dr. Ayala of neurology.  To summarize the patient presented to hospital for evaluation of dizziness, the patient stated that he began to have dizziness described vertigo around 1500 on day of presentation, he also has a history of vertigo and noted it felt similar to prior episodes.  He was also having left facial numbness after symptom onset, and mild blurring of vision.  Blood pressure was 127/78 on arrival, the patient was not on AP or AC at home, symptoms did improve in the ED. NIH was reported to be 0.    CT of head - No acute intracranial abnormality.    CTA of head and neck - No large vessel occlusion in the head or neck.     The patient was not a TNK candidate there was no LVO for endovascular procedure.  It was felt that his symptoms were more likely from a peripheral cause, but the patient was placed on the stroke pathway and loaded with aspirin and Plavix.  Neurology was asked evaluate the patient.     Labs/Testing:  BMP with chloride of 111 high  CBC with leukocytosis of 11.18 high  INR/APTT normal  Troponin normal  Glucose 109  LDL 92  A1c pending    CT of head - No acute intracranial abnormality.   CTA of head and neck - No large vessel occlusion in the head or neck.     Past medical hx, surgical hx - reviewed  Medications and allergies - reviewed  Social and family hx reviewed    The patient was outside, all of sudden at 3PM you developed vertigo room spinning, with blurring of vision no diplopia or vision loss, he was off balance with this and had sit down.  The dizziness unclear if worse with movement, lasted twenty minutes to 30 minutes.  It was not positional or worse with movement, associated with CP, shoulder pain and neck pain. He reports he has chronic dizziness, but this was different.  He reports if he gets up to fast, you feel dizzy or even when he gets up.  Currently he feels fine, no headache with this.  He denies any lateralizing neurological symptoms. He has had rinigng in his ears for some time but recently he reports hearing loss bilaterally.     Review of Systems   12 point ROS are negative, except for HPI  Denies headache  Denies confusion  Denies problems with vision  Denies problems with speech  Denies one sided weakness or numbness  Denies ataxia  Denies tremor  Denies seizure like activity    Objective :  Temp:  [97.6 °F (36.4 °C)-98 °F (36.7 °C)] 97.7 °F (36.5 °C)  HR:  [] 71  BP: (120-151)/(71-93) 130/71  Resp:  [14-24] 20  SpO2:  [94 %-98 %] 95 %  O2 Device: None (Room air)    Current Outpatient Medications on File Prior to  Encounter   Medication Sig    acetaminophen (TYLENOL) 325 mg tablet Take 2 tablets (650 mg total) by mouth every 6 (six) hours as needed for mild pain    Alcohol Swabs 70 % PADS May substitute brand based on insurance coverage. Check glucose TID.    atorvastatin (LIPITOR) 10 mg tablet Take 10 mg by mouth daily (Patient not taking: Reported on 10/26/2023)    Blood Glucose Monitoring Suppl (OneTouch Verio Reflect) w/Device KIT May substitute brand based on insurance coverage. Check glucose TID.    DULoxetine (CYMBALTA) 20 mg capsule Take 20 mg by mouth 2 (two) times a day (Patient not taking: Reported on 10/26/2023)    famotidine (PEPCID) 20 mg tablet Take 1 tablet by mouth 2 (two) times a day as needed for heartburn (Patient not taking: Reported on 6/16/2019)    glucose blood (OneTouch Verio) test strip May substitute brand based on insurance coverage. Check glucose TID.    insulin aspart (NovoLOG FlexPen) 100 UNIT/ML injection pen Inject 8 Units under the skin 3 (three) times a day with meals    Insulin Glargine Solostar (Lantus SoloStar) 100 UNIT/ML SOPN Inject 0.38 mL (38 Units total) under the skin daily at bedtime    Insulin Pen Needle (BD Pen Needle Maricarmen 2nd Gen) 32G X 4 MM MISC For use with insulin pen. Pharmacy may dispense brand covered by insurance.    Insulin Pen Needle (BD Pen Needle Maricarmen 2nd Gen) 32G X 4 MM MISC For use with insulin pen. Pharmacy may dispense brand covered by insurance.    metFORMIN (GLUCOPHAGE) 500 mg tablet Take 500 mg by mouth 2 (two) times a day with meals (Patient not taking: Reported on 10/26/2023)    OneTouch Delica Lancets 33G MISC May substitute brand based on insurance coverage. Check glucose TID.    pregabalin (LYRICA) 75 mg capsule Take 75 mg by mouth 2 (two) times a day (Patient not taking: Reported on 10/26/2023)    traMADol (ULTRAM) 50 mg tablet Take 1 tablet (50 mg total) by mouth every 6 (six) hours as needed for moderate pain (Patient not taking: Reported on 2/24/2020)       Current Facility-Administered Medications   Medication Dose Route Frequency Provider Last Rate    aspirin  81 mg Oral Daily Rashaad Avalos MD      atorvastatin  40 mg Oral QPM Rashaad Avalos MD      clopidogrel  75 mg Oral Daily Rashaad Avalos MD      enoxaparin  40 mg Subcutaneous Daily Rashaad Avalos MD      insulin glargine  30 Units Subcutaneous HS Rashaad Avalos MD      insulin lispro  1-5 Units Subcutaneous 4x Daily (AC & HS) Rashaad Avalos MD       Vs reviewed  Constitutional - in NAD  HEENT - NC/AT, no icterus noted, conjuctiva clear, oral mucosa free of exudate  Cardiac -rate regular  Lungs - no wheezing noted.  Abdomen - Soft and non tender, non distended  Extremities - No edema noted  Skin - no rashes noted  Neurological  Mental status - the patient is awake alert oriented x 3, with no evidence of aphasia or dysarthria, patient is able to follow simple commands, is able to follow complex commands. No paraphasic errors noted.  He is able to read and complete simple calculations, normal attn and concentration   Cranial nerves 2 through 12 -pupils are equal and reactive extra movements are intact without gaze preference or disconjugate however he does have a nystagmus rotary to the left as well as a rotary upward nystagmus bilaterally, there is no facial droop, V1-V3 was intact, visual fields were intact, tongue was midline, hard of hearing.  Motor - 5/5 upper extremities and lower extremities, without drift, normal tone and bulk  No tremor or fixed deficit noted  Rapid movements are equal bilaterally  Sensation - nonfocal to touch, no neglect noted.   Coordination - no ataxia noted on finger-to-nose or heel-to-shin  Reflexes are equal 1 in the uppers and lowers.   Toes are downgoing bilaterally  No evidence of clonus or myoclonus or tremor.  No evidence of seizure activity, observed.       Lab Results: I have reviewed the following results:I have personally reviewed pertinent reports.  , CBC:   Results  "from last 7 days   Lab Units 10/07/24  0448 10/06/24  1635   WBC Thousand/uL 7.50 11.18*   RBC Million/uL 4.71 5.16   HEMOGLOBIN g/dL 14.3 15.5   HEMATOCRIT % 42.0 46.1   MCV fL 89 89   PLATELETS Thousands/uL 183 226   , BMP/CMP:   Results from last 7 days   Lab Units 10/07/24  0448 10/06/24  1635   SODIUM mmol/L 141 142   POTASSIUM mmol/L 3.5 4.1   CHLORIDE mmol/L 110* 111*   CO2 mmol/L 24 22   BUN mg/dL 20 23   CREATININE mg/dL 0.80 1.15   CALCIUM mg/dL 7.9* 8.8   EGFR ml/min/1.73sq m 91 64   , Vitamin B12:   , HgBA1C:   , TSH:   , Coagulation:   Results from last 7 days   Lab Units 10/06/24  1635   INR  0.91   , Lipid Profile:   Results from last 7 days   Lab Units 10/07/24  0448   HDL mg/dL 42   LDL CALC mg/dL 92   TRIGLYCERIDES mg/dL 71   , Ammonia:   , Urinalysis:       Invalid input(s): \"URIBILINOGEN\", Drug Screen:   , Medication Drug Levels:       Invalid input(s): \"CARBAMAZEPINE\", \"OXCARBAZEPINE\"  Recent Labs     10/07/24  0448   WBC 7.50   HGB 14.3   HCT 42.0      SODIUM 141   K 3.5   *   CO2 24   BUN 20   CREATININE 0.80   GLUC 60*     Procedure: CTA stroke alert (head/neck)    Result Date: 10/6/2024  Narrative: CTA NECK AND BRAIN WITH AND WITHOUT CONTRAST INDICATION: Stroke Alert COMPARISON:   10/25/2023 TECHNIQUE:  Post contrast imaging was performed after administration of iodinated contrast through the neck and brain. Post contrast axial 0.625 mm images timed to opacify the arterial system.  3D rendering was performed on an independent workstation.   MIP reconstructions performed. Coronal and sagittal reconstructions were performed of the non contrast portion of the brain. Radiation dose length product (DLP) for this visit:  607 mGy-cm .  This examination, like all CT scans performed in the [facility], was performed utilizing techniques to minimize radiation dose exposure, including the use of iterative reconstruction and automated exposure control. IV Contrast:  85 mL of " iohexol (OMNIPAQUE) IMAGE QUALITY:   Diagnostic FINDINGS: CTA NECK ARCH AND GREAT VESSELS: Visualized arch and great vessels are normal. VERTEBRAL ARTERIES: Patent dominant left vertebral artery. Hypoplastic right vertebral artery with mild to moderate stenosis at its origin. Segment of the cervical vertebral arteries are limited in assessment due to venous contamination. RIGHT CAROTID: Mixed plaque at the bifurcation. No stenosis.    No dissection. LEFT CAROTID: Predominantly calcified plaque at the bifurcation. No stenosis.    No dissection. NASCET criteria was used to determine the degree of internal carotid artery diameter stenosis. CTA BRAIN: INTERNAL CAROTID ARTERIES: No stenosis or occlusion. ANTERIOR CEREBRAL ARTERY CIRCULATION:  No stenosis or occlusion. MIDDLE CEREBRAL ARTERY CIRCULATION:  No stenosis or occlusion. DISTAL VERTEBRAL ARTERIES:  No stenosis or occlusion. Hypoplastic right vertebral artery terminates in right PICA. Dominant left vertebral artery continues as the basilar artery. BASILAR ARTERY:  No stenosis or occlusion. POSTERIOR CEREBRAL ARTERIES: No stenosis or occlusion. VENOUS STRUCTURES:  Normal. NON VASCULAR ANATOMY BONY STRUCTURES:  No acute osseous abnormality. SOFT TISSUES OF THE NECK:  Normal. THORACIC INLET:  Unremarkable.     Impression: No large vessel occlusion in the head or neck. Findings communicated to  Dennis Ayala at 4:54 p.m. 10/6/2024. Workstation performed: [id number]     Procedure: CT stroke alert brain    Result Date: 10/6/2024  Narrative: CT BRAIN - STROKE ALERT PROTOCOL INDICATION:   Stroke Alert. COMPARISON: October 25, 2023 TECHNIQUE:  CT examination of the brain was performed.  In addition to axial images, coronal reformatted images were created and submitted for interpretation. Radiation dose length product (DLP) for this visit:  875 mGy-cm .  This examination, like all CT scans performed in the [facility], was performed utilizing techniques to  minimize radiation dose exposure, including the use of iterative reconstruction and automated exposure control. IMAGE QUALITY:  Diagnostic. FINDINGS: PARENCHYMA: Decreased attenuation is noted in periventricular and subcortical white matter demonstrating an appearance that is statistically most likely to represent mild microangiopathic change; this appearance is similar when compared to most recent prior examination. No CT signs of acute infarction.  No intracranial mass, mass effect or midline shift.  No acute parenchymal hemorrhage. Normal intracranial vasculature. VENTRICLES AND EXTRA-AXIAL SPACES:  Normal for the patient's age. VISUALIZED ORBITS: Bilateral lens replacements. PARANASAL SINUSES: Mild polypoid mucosal thickening left maxillary sinus. CALVARIUM AND EXTRACRANIAL SOFT TISSUES:   Normal.     Impression: No acute intracranial abnormality. Findings were directly discussed with Dennis Ayala at approximately 4:48 p.m. 10/6/2024. Workstation performed: [id number]       Reviewed case with neurology attending, history and physical examination, labs and imaging completed, plan of care as per attending physician.    Please see attestation for further details.    Examined alongside attending physician.

## 2024-10-07 NOTE — PLAN OF CARE
Problem: Neurological Deficit  Goal: Neurological status is stable or improving  Description: Interventions:  - Monitor and assess patient's level of consciousness, motor function, sensory function, and level of assistance needed for ADLs.   - Monitor and report changes from baseline. Collaborate with interdisciplinary team to initiate plan and implement interventions as ordered.   - Provide and maintain a safe environment.  - Consider seizure precautions.  - Consider fall precautions.  - Consider aspiration precautions.  - Consider bleeding precautions.  Outcome: Progressing     Problem: PAIN - ADULT  Goal: Verbalizes/displays adequate comfort level or baseline comfort level  Description: Interventions:  - Encourage patient to monitor pain and request assistance  - Assess pain using appropriate pain scale  - Administer analgesics based on type and severity of pain and evaluate response  - Implement non-pharmacological measures as appropriate and evaluate response  - Consider cultural and social influences on pain and pain management  - Notify physician/advanced practitioner if interventions unsuccessful or patient reports new pain  Outcome: Progressing     Problem: SAFETY ADULT  Goal: Patient will remain free of falls  Description: INTERVENTIONS:  - Educate patient/family on patient safety including physical limitations  - Instruct patient to call for assistance with activity   - Consult OT/PT to assist with strengthening/mobility   - Keep Call bell within reach  - Keep bed low and locked with side rails adjusted as appropriate  - Keep care items and personal belongings within reach  - Initiate and maintain comfort rounds  - Make Fall Risk Sign visible to staff  - Offer Toileting every 2  Hours, in advance of need  - Initiate/Maintain bed alarm  - Obtain necessary fall risk management equipment:   - Apply yellow socks and bracelet for high fall risk patients  - Consider moving patient to room near nurses  station  Outcome: Progressing     Problem: DISCHARGE PLANNING  Goal: Discharge to home or other facility with appropriate resources  Description: INTERVENTIONS:  - Identify barriers to discharge w/patient and caregiver  - Arrange for needed discharge resources and transportation as appropriate  - Identify discharge learning needs (meds, wound care, etc.)  - Arrange for interpretive services to assist at discharge as needed  - Refer to Case Management Department for coordinating discharge planning if the patient needs post-hospital services based on physician/advanced practitioner order or complex needs related to functional status, cognitive ability, or social support system  Outcome: Progressing

## 2024-10-07 NOTE — CASE MANAGEMENT
Case Management Assessment & Discharge Planning Note    Patient name Rashaad Centeno  Location 2 EAST 258/2 E 258-01 MRN [id number]  : 1955 Date 10/7/2024       Current Admission Date: 10/6/2024  Current Admission Diagnosis:Stroke-like symptom   Patient Active Problem List    Diagnosis Date Noted Date Diagnosed    Stroke-like symptom 10/06/2024     Osteomyelitis (HCC) 10/29/2023     Diabetic ulcer of left great toe (HCC) 10/25/2023     Abdominal pain 2016     Diabetes mellitus (HCC)        LOS (days): 0  Geometric Mean LOS (GMLOS) (days):   Days to GMLOS:     OBJECTIVE:              Current admission status: Observation       Preferred Pharmacy:   [facility]  [place], [place]  [address]   [facility] [address]  Phone: [phone] Fax: [phone]    Primary Care Provider: Prema Barnes MD    Primary Insurance: [facility]  Secondary Insurance:     ASSESSMENT:  Active Health Care Proxies       Jhoana Centeno Health Care Representative - Spouse   Primary Phone: [phone] (Mobile)                 Advance Directives  Does patient have a Health Care POA?: Yes  Does patient have Advance Directives?: Yes  Advance Directives: Power of  for health care  Primary Contact: Jhoana Centeno (Spouse)  [phone]         Readmission Root Cause  30 Day Readmission: No    Patient Information  Admitted from:: Home  Mental Status: Alert  During Assessment patient was accompanied by: Not accompanied during assessment  Assessment information provided by:: Patient  Primary Caregiver: Self  Support Systems: Spouse/significant other  County of Residence: [place]  What city do you live in?: [facility]  Home entry access options. Select all that apply.: Stairs  Number of steps to enter home.: One Flight  Do the steps have railings?: Yes  Type of Current Residence: [address] home  Upon entering residence, is there a bedroom on the main floor (no further steps)?:  No  A bedroom is located on the following floor levels of residence (select all that apply):: 2nd Floor  Upon entering residence, is there a bathroom on the main floor (no further steps)?: No  Indicate which floors of current residence have a bathroom (select all the apply):: 2nd Floor  Number of steps to 2nd floor from main floor: One Flight  Living Arrangements: Lives w/ Spouse/significant other  Is patient a ?: Yes  Is [facility] with [facility] [employer])?: Yes  Is patient service connected?: Yes    Activities of Daily Living Prior to Admission  Functional Status: Independent  Completes ADLs independently?: Yes  Ambulates independently?: Yes  Does patient use assisted devices?: No  Does patient currently own DME?: Yes  What DME does the patient currently own?: [employer]  Does patient have a history of Outpatient Therapy (PT/OT)?: No  Does the patient have a history of Short-Term Rehab?: No  Does patient have a history of HHC?: No  Does patient currently have HHC?: No         Patient Information Continued  Income Source: Pension/snf  Does patient have prescription coverage?: Yes  Does patient receive dialysis treatments?: No  Does patient have a history of substance abuse?: No  Does patient have a history of Mental Health Diagnosis?: No         Means of Transportation  Means of Transport to Appts:: Drives Self      Social Determinants of Health (SDOH)      Flowsheet Row Most Recent Value   Housing Stability    In the last 12 months, was there a time when you were not able to pay the mortgage or rent on time? N   In the past 12 months, how many times have you moved where you were living? 0   At any time in the past 12 months, were you homeless or living in a shelter (including now)? N   Transportation Needs    In the past 12 months, has lack of transportation kept you from medical appointments or from getting medications? no   In the past 12 months, has lack of transportation kept you from  meetings, work, or from getting things needed for daily living? No   Food Insecurity    Within the past 12 months, you worried that your food would run out before you got the money to buy more. Never true   Within the past 12 months, the food you bought just didn't last and you didn't have money to get more. Never true   Utilities    In the past 12 months has the electric, gas, oil, or water company threatened to shut off services in your home? No            DISCHARGE DETAILS:    Discharge planning discussed with:: Pt  Freedom of Choice: Yes  Comments - Freedom of Choice: CM met with pt at bedside and  introduced self/role. FOC discussed at length. Pt is alert and oriented x3 able to make his needs known and encouraged to do so. FOC discussed at length. Pt AMPAC is 24. No CM needs anticipated. CM continue to follow.  CM contacted family/caregiver?: No- see comments (Pt to update his wife)  Were Treatment Team discharge recommendations reviewed with patient/caregiver?: Yes  Did patient/caregiver verbalize understanding of patient care needs?: Yes  Were patient/caregiver advised of the risks associated with not following Treatment Team discharge recommendations?: Yes    Contacts  Reason/Outcome: Continuity of Care, Referral, Emergency Contact, Discharge Planning    Requested Home Health Care         Is the patient interested in HHC at discharge?: No    DME Referral Provided  Referral made for DME?: No    Other Referral/Resources/Interventions Provided:  Interventions: None Indicated    Would you like to participate in our [facility] service program?  : No - Declined    Treatment Team Recommendation: Home  Discharge Destination Plan:: Home  Transport at Discharge : Family

## 2024-10-08 ENCOUNTER — TELEPHONE (OUTPATIENT)
Age: 69
End: 2024-10-08

## 2024-10-08 VITALS
HEIGHT: 70 IN | OXYGEN SATURATION: 94 % | RESPIRATION RATE: 18 BRPM | BODY MASS INDEX: 27.63 KG/M2 | TEMPERATURE: 97.6 F | DIASTOLIC BLOOD PRESSURE: 78 MMHG | WEIGHT: 193 LBS | SYSTOLIC BLOOD PRESSURE: 138 MMHG | HEART RATE: 76 BPM

## 2024-10-08 LAB — GLUCOSE SERPL-MCNC: 119 MG/DL (ref 65–140)

## 2024-10-08 PROCEDURE — 99239 HOSP IP/OBS DSCHRG MGMT >30: CPT | Performed by: STUDENT IN AN ORGANIZED HEALTH CARE EDUCATION/TRAINING PROGRAM

## 2024-10-08 PROCEDURE — 82948 REAGENT STRIP/BLOOD GLUCOSE: CPT

## 2024-10-08 PROCEDURE — 99233 SBSQ HOSP IP/OBS HIGH 50: CPT | Performed by: PSYCHIATRY & NEUROLOGY

## 2024-10-08 RX ORDER — ASPIRIN 81 MG/1
81 TABLET, CHEWABLE ORAL DAILY
Qty: 30 TABLET | Refills: 0 | Status: CANCELLED | OUTPATIENT
Start: 2024-10-09

## 2024-10-08 RX ORDER — ATORVASTATIN CALCIUM 10 MG/1
20 TABLET, FILM COATED ORAL DAILY
Qty: 60 TABLET | Refills: 0 | Status: CANCELLED | OUTPATIENT
Start: 2024-10-08

## 2024-10-08 RX ADMIN — CLOPIDOGREL 75 MG: 75 TABLET ORAL at 09:21

## 2024-10-08 RX ADMIN — ENOXAPARIN SODIUM 40 MG: 40 INJECTION SUBCUTANEOUS at 09:21

## 2024-10-08 RX ADMIN — ASPIRIN 81 MG: 81 TABLET, CHEWABLE ORAL at 09:21

## 2024-10-08 NOTE — PLAN OF CARE
Problem: Neurological Deficit  Goal: Neurological status is stable or improving  Description: Interventions:  - Monitor and assess patient's level of consciousness, motor function, sensory function, and level of assistance needed for ADLs.   - Monitor and report changes from baseline. Collaborate with interdisciplinary team to initiate plan and implement interventions as ordered.   - Provide and maintain a safe environment.  - Consider seizure precautions.  - Consider fall precautions.  - Consider aspiration precautions.  - Consider bleeding precautions.  Outcome: Progressing     Problem: Activity Intolerance/Impaired Mobility  Goal: Mobility/activity is maintained at optimum level for patient  Description: Interventions:  - Assess and monitor patient  barriers to mobility and need for assistive/adaptive devices.  - Assess patient's emotional response to limitations.  - Collaborate with interdisciplinary team and initiate plans and interventions as ordered.  - Encourage independent activity per ability.  - Maintain proper body alignment.  - Perform active/passive rom as tolerated/ordered.  - Plan activities to conserve energy.  - Turn patient as appropriate  Outcome: Progressing     Problem: Communication Impairment  Goal: Ability to express needs and understand communication  Description: Assess patient's communication skills and ability to understand information.  Patient will demonstrate use of effective communication techniques, alternative methods of communication and understanding even if not able to speak.     - Encourage communication and provide alternate methods of communication as needed.  - Collaborate with case management/ for discharge needs.  - Include patient/family/caregiver in decisions related to communication.  Outcome: Progressing     Problem: Potential for Aspiration  Goal: Non-ventilated patient's risk of aspiration is minimized  Description: Assess and monitor vital signs,  respiratory status, and labs (WBC).  Monitor for signs of aspiration (tachypnea, cough, rales, wheezing, cyanosis, fever).    - Assess and monitor patient's ability to swallow.  - Place patient up in chair to eat if possible.  - HOB up at 90 degrees to eat if unable to get patient up into chair.  - Supervise patient during oral intake.   - Instruct patient/ family to take small bites.  - Instruct patient/ family to take small single sips when taking liquids.  - Follow patient-specific strategies generated by speech pathologist.  Outcome: Progressing  Goal: Ventilated patient's risk of aspiration is minimized  Description: Assess and monitor vital signs, respiratory status, airway cuff pressure, and labs (WBC).  Monitor for signs of aspiration (tachypnea, cough, rales, wheezing, cyanosis, fever).    - Elevate head of bed 30 degrees if patient has tube feeding.  - Monitor tube feeding.  Outcome: Progressing     Problem: Nutrition  Goal: Nutrition/Hydration status is improving  Description: Monitor and assess patient's nutrition/hydration status for malnutrition (ex- brittle hair, bruises, dry skin, pale skin and conjunctiva, muscle wasting, smooth red tongue, and disorientation). Collaborate with interdisciplinary team and initiate plan and interventions as ordered.  Monitor patient's weight and dietary intake as ordered or per policy. Utilize nutrition screening tool and intervene per policy. Determine patient's food preferences and provide high-protein, high-caloric foods as appropriate.     - Assist patient with eating.  - Allow adequate time for meals.  - Encourage patient to take dietary supplement as ordered.  - Collaborate with clinical nutritionist.  - Include patient/family/caregiver in decisions related to nutrition.  Outcome: Progressing     Problem: PAIN - ADULT  Goal: Verbalizes/displays adequate comfort level or baseline comfort level  Description: Interventions:  - Encourage patient to monitor pain and  request assistance  - Assess pain using appropriate pain scale  - Administer analgesics based on type and severity of pain and evaluate response  - Implement non-pharmacological measures as appropriate and evaluate response  - Consider cultural and social influences on pain and pain management  - Notify physician/advanced practitioner if interventions unsuccessful or patient reports new pain  Outcome: Progressing     Problem: INFECTION - ADULT  Goal: Absence or prevention of progression during hospitalization  Description: INTERVENTIONS:  - Assess and monitor for signs and symptoms of infection  - Monitor lab/diagnostic results  - Monitor all insertion sites, i.e. indwelling lines, tubes, and drains  - Monitor endotracheal if appropriate and nasal secretions for changes in amount and color  - [place] appropriate cooling/warming therapies per order  - Administer medications as ordered  - Instruct and encourage patient and family to use good hand hygiene technique  - Identify and instruct in appropriate isolation precautions for identified infection/condition  Outcome: Progressing  Goal: Absence of fever/infection during neutropenic period  Description: INTERVENTIONS:  - Monitor WBC    Outcome: Progressing     Problem: SAFETY ADULT  Goal: Patient will remain free of falls  Description: INTERVENTIONS:  - Educate patient/family on patient safety including physical limitations  - Instruct patient to call for assistance with activity   - Consult OT/PT to assist with strengthening/mobility   - Keep Call bell within reach  - Keep bed low and locked with side rails adjusted as appropriate  - Keep care items and personal belongings within reach  - Initiate and maintain comfort rounds  - Make Fall Risk Sign visible to staff  - Offer Toileting every 2 Hours, in advance of need  - Initiate/Maintain bed alarm  - Obtain necessary fall risk management equipment:   - Apply yellow socks and bracelet for high fall risk patients  -  Consider moving patient to room near nurses station  Outcome: Progressing  Goal: Maintain or return to baseline ADL function  Description: INTERVENTIONS:  -  Assess patient's ability to carry out ADLs; assess patient's baseline for ADL function and identify physical deficits which impact ability to perform ADLs (bathing, care of mouth/teeth, toileting, grooming, dressing, etc.)  - Assess/evaluate cause of self-care deficits   - Assess range of motion  - Assess patient's mobility; develop plan if impaired  - Assess patient's need for assistive devices and provide as appropriate  - Encourage maximum independence but intervene and supervise when necessary  - Involve family in performance of ADLs  - Assess for home care needs following discharge   - Consider OT consult to assist with ADL evaluation and planning for discharge  - Provide patient education as appropriate  Outcome: Progressing  Goal: Maintains/Returns to pre admission functional level  Description: INTERVENTIONS:  - Perform AM-PAC 6 Click Basic Mobility/ Daily Activity assessment daily.  - Set and communicate daily mobility goal to care team and patient/family/caregiver.   - Collaborate with rehabilitation services on mobility goals if consulted  - Perform Range of Motion 3 times a day.  - Reposition patient every 2 hours.  - Dangle patient 3 times a day  - Stand patient 3 times a day  - Ambulate patient 3 times a day  - Out of bed to chair 3 times a day   - Out of bed for meals 3 times a day  - Out of bed for toileting  - Record patient progress and toleration of activity level   Outcome: Progressing     Problem: DISCHARGE PLANNING  Goal: Discharge to home or other facility with appropriate resources  Description: INTERVENTIONS:  - Identify barriers to discharge w/patient and caregiver  - Arrange for needed discharge resources and transportation as appropriate  - Identify discharge learning needs (meds, wound care, etc.)  - Arrange for interpretive  services to assist at discharge as needed  - Refer to Case Management Department for coordinating discharge planning if the patient needs post-hospital services based on physician/advanced practitioner order or complex needs related to functional status, cognitive ability, or social support system  Outcome: Progressing     Problem: Knowledge Deficit  Goal: Patient/family/caregiver demonstrates understanding of disease process, treatment plan, medications, and discharge instructions  Description: Complete learning assessment and assess knowledge base.  Interventions:  - Provide teaching at level of understanding  - Provide teaching via preferred learning methods  Outcome: Progressing

## 2024-10-08 NOTE — PLAN OF CARE
Problem: Neurological Deficit  Goal: Neurological status is stable or improving  Description: Interventions:  - Monitor and assess patient's level of consciousness, motor function, sensory function, and level of assistance needed for ADLs.   - Monitor and report changes from baseline. Collaborate with interdisciplinary team to initiate plan and implement interventions as ordered.   - Provide and maintain a safe environment.  - Consider seizure precautions.  - Consider fall precautions.  - Consider aspiration precautions.  - Consider bleeding precautions.  Outcome: Progressing     Problem: Activity Intolerance/Impaired Mobility  Goal: Mobility/activity is maintained at optimum level for patient  Description: Interventions:  - Assess and monitor patient  barriers to mobility and need for assistive/adaptive devices.  - Assess patient's emotional response to limitations.  - Collaborate with interdisciplinary team and initiate plans and interventions as ordered.  - Encourage independent activity per ability.  - Maintain proper body alignment.  - Perform active/passive rom as tolerated/ordered.  - Plan activities to conserve energy.  - Turn patient as appropriate  Outcome: Progressing     Problem: Communication Impairment  Goal: Ability to express needs and understand communication  Description: Assess patient's communication skills and ability to understand information.  Patient will demonstrate use of effective communication techniques, alternative methods of communication and understanding even if not able to speak.     - Encourage communication and provide alternate methods of communication as needed.  - Collaborate with case management/ for discharge needs.  - Include patient/family/caregiver in decisions related to communication.  Outcome: Progressing

## 2024-10-08 NOTE — PROGRESS NOTES
Progress Note - Neurology   Name: Rashaad Centeno 69 y.o. male I MRN: [id number]  Unit/Bed#: 2 E 258-01 I Date of Admission: 10/6/2024   Date of Service: 10/8/2024 I Hospital Day: 0    Assessment & Plan  Stroke-like symptom  69 year old with past history of uncontrolled diabetes, prostate cancer, peripheral neuropathy, chronic gait dysfunction who presents to [facility] on 10/6, as a stroke alert. To summarize the patient presented to hospital for evaluation of dizziness, the patient stated that he began to have dizziness described vertigo around 1500 on day of presentation, he also has a history of vertigo and noted it felt similar to prior episodes.  He was also having left facial numbness after symptom onset, and mild blurring of vision.  He also had CP and dyspnea with this episode.   Blood pressure was 127/78 on arrival, the patient was not on AP or AC at home, symptoms did improve in the ED. NIH was reported to be 0.  He has recently noted hearing loss as well, along with ear ringing.    The patient was not a TNK candidate there was no LVO for endovascular procedure.  It was felt that his symptoms were more likely from a peripheral cause, but the patient was placed on the stroke pathway and loaded with aspirin and Plavix. Neurology was asked evaluate the patient.     Workup:  -CT head: No acute intracranial abnormality.   -CTA head and neck: No large vessel occlusion in the head or neck.   -MRI brain: No evidence of acute infarct, intracranial hemorrhage or mass.   -Echo: Bilateral atrium size normal.  -Labs: A1C 7.4, LDL 92    Imaging without acute infarct noted. Suspect most likely peripheral etiology.    Plan:  -Stroke pathway  No need to continue antiplatelet therapy from neuro standpoint  Atorvastatin 40 mg- defer to primary team if need to continue  Continue telemetry  PT/OT/ST  Frequent neuro checks. Continue to monitor and notify neurology with any changes.   -Medical management and  supportive care per primary team. Correction of any metabolic or infectious disturbances.    -No further inpatient neurology recommendations at this time. Please call with any questions.  -Case and treatment plan reviewed with attending neurologist, Dr. Brown. Please see attending attestation for any further recommendations.  Diabetes mellitus (HCC)  Lab Results   Component Value Date    HGBA1C 7.4 (H) 10/07/2024       Recent Labs     10/07/24  1134 10/07/24  1659 10/07/24  2058 10/08/24  0731   POCGLU 154* 138 161* 119       Blood Sugar Average: Last 72 hrs:  (P) [id number]    -Medical management per primary team    Rashaad Centeno will need follow-up in in 6 weeks with general neurology team for Peripheral vertigo in 30 minute appointment. They will not require outpatient neurological testing. Message sent to schedulers.       Subjective   Patient sitting in chair. He states he still has intermittent dizziness with movements or changing positions.    Review of Systems  A 12 system ROS was completed. Other than the above mentioned complaints in the HPI and those commented on below, all remaining systems were negative.      Objective :  Temp:  [97.6 °F (36.4 °C)-98.3 °F (36.8 °C)] 97.6 °F (36.4 °C)  HR:  [72-83] 76  BP: (127-138)/(63-80) 138/78  Resp:  [16-18] 18  SpO2:  [94 %-98 %] 94 %  O2 Device: None (Room air)    Physical Exam  Vitals and nursing note reviewed.   Constitutional:       General: He is not in acute distress.     Appearance: He is not ill-appearing or diaphoretic.   HENT:      Head: Normocephalic.      Mouth/Throat:      Mouth: Mucous membranes are moist.      Pharynx: Oropharynx is clear.   Eyes:      General: No scleral icterus.        Right eye: No discharge.         Left eye: No discharge.      Extraocular Movements: Extraocular movements intact.      Conjunctiva/sclera: Conjunctivae normal.   Cardiovascular:      Rate and Rhythm: Normal rate.   Pulmonary:      Effort: Pulmonary effort  is normal. No respiratory distress.   Musculoskeletal:         General: Normal range of motion.   Skin:     General: Skin is warm and dry.      Coloration: Skin is not jaundiced or pale.   Neurological:      Mental Status: He is oriented to person, place, and time.   Psychiatric:         Mood and Affect: Mood normal.       Neurological Exam  Mental Status  Awake, alert and oriented to person, place and time. Oriented to person, place, time and situation. Oriented to person, place, and time.  Able to follow commands. No dysarthria or aphasia noted..    Cranial Nerves  CN II: Visual fields full to confrontation.  CN III, IV, VI: Extraocular movements intact bilaterally.  CN V: Facial sensation is normal.  CN VII: Full and symmetric facial movement.  CN VIII: Hearing is normal.  CN IX, X: Palate elevates symmetrically  CN XI: Shoulder shrug strength is normal.  CN XII: Tongue midline without atrophy or fasciculations.    Motor  Normal muscle bulk throughout.  Bilateral UE strength 5/5 deltoids, biceps, triceps, hand   Bilateral LE strength 5/5 hip flexion, knee flexion, knee extension, dorsiflexion.    Sensory  Light touch is normal in upper and lower extremities.     Coordination  Right: Finger-to-nose normal. Heel-to-shin normal.        Lab Results: I have reviewed the following results:  Imaging Results Review: I reviewed radiology reports from this admission including: CTA head and neck, CT head, MRI brain, and Echocardiogram.  Other Study Results Review: No additional pertinent studies reviewed.    VTE Pharmacologic Prophylaxis: Sequential compression device (Venodyne)  and Enoxaparin (Lovenox)

## 2024-10-08 NOTE — DISCHARGE SUMMARY
Medical Problems       Resolved Problems  Date Reviewed: 11/2/2023   None       Discharging Physician / Practitioner: Dorian Thomas MD  PCP: Prema Barnes MD  Admission Date:   Admission Orders (From admission, onward)       Ordered        10/06/24 1801  Place in Observation  Once                          Discharge Date: 10/08/24    Consultations During Hospital Stay:  Neurology    Procedures Performed:   None    Significant Findings / Test Results:   MRI brain wo contrast   Final Result      No evidence of acute infarct, intracranial hemorrhage or mass.      Workstation performed: [id number]         CTA stroke alert (head/neck)   Final Result      No large vessel occlusion in the head or neck.               Findings communicated to  Dennis Ayala at 4:54 p.m. 10/6/2024.      Workstation performed: [id number]         CT stroke alert brain   Final Result      No acute intracranial abnormality.      Findings were directly discussed with Dennis Ayala at approximately 4:48 p.m. 10/6/2024.      Workstation performed: [id number]                 Incidental Findings:   none    Test Results Pending at Discharge (will require follow up):   none     Outpatient Tests Requested:  none    Complications:  none    Reason for Admission: Strokelike symptoms    Hospital Course:   Rashaad Centeno is a 69 y.o. male patient who originally presented to the hospital on 10/6/2024 due to strokelike symptoms.  Patient has a history of uncontrolled diabetes, CA prostate, peripheral neuropathy, chronic gait dysfunction presented to [facility] as a stroke alert on 10/6.  Imaging studies including CTA head and neck, MRI ruled out ischemic stroke.  Echocardiogram showed grade 1 diastolic dysfunction with no LV thrombus.  Neuro recommended no need of antiplatelet and statins.  Had a discussion with patient as patient has type 2 diabetes and needs to be on low to moderate statins although his LDL is between .  Patient refused to start statins  "for now and he will have a discussion with his pcp.            Condition at Discharge: good    Discharge Day Visit / Exam:   Subjective: No overnight event.  No active complaint  Vitals: Blood Pressure: 138/78 (10/08/24 0731)  Pulse: 76 (10/08/24 0731)  Temperature: 97.6 °F (36.4 °C) (10/08/24 0731)  Temp Source: Oral (10/08/24 0731)  Respirations: 18 (10/08/24 0731)  Height: 5' 10\" (177.8 cm) (10/07/24 0800)  Weight - Scale: 87.5 kg (193 lb) (10/07/24 0800)  SpO2: 94 % (10/08/24 0731)  Exam:   Constitutional:no Acute distress  HEENT: no Pallor or icterus  CVS: S1 plus S2  Respiratory: Normal vascular breathe without crackles and wheeze  Gastroenterology: Soft nontender without any palpable mass  Skin: No bruises or ecchymosis  Neurology: No focal logical deficit     Discussion with Family: Discussed with patient in detail regarding follow-up plan    Discharge instructions/Information to patient and family:   See after visit summary for information provided to patient and family.      Provisions for Follow-Up Care:  See after visit summary for information related to follow-up care and any pertinent home health orders.      Mobility at time of Discharge:   Basic Mobility Inpatient Raw Score: 24  JH-HLM Goal: 8: Walk 250 feet or more  JH-HLM Achieved: 6: Walk 10 steps or more  HLM Goal achieved. Continue to encourage appropriate mobility.     Disposition:   Home    Planned Readmission: none     Discharge Statement:  I spent 37 minutes discharging the patient. This time was spent on the day of discharge. I had direct contact with the patient on the day of discharge. Greater than 50% of the total time was spent examining patient, answering all patient questions, arranging and discussing plan of care with patient as well as directly providing post-discharge instructions.  Additional time then spent on discharge activities.    Discharge Medications:  See after visit summary for reconciled discharge medications provided to " patient and/or family.      **Please Note: This note may have been constructed using a voice recognition system**

## 2024-10-08 NOTE — PHYSICAL THERAPY NOTE
"                                     Physical Therapy Cancellation Note           10/08/24 1041   PT Last Visit   PT Visit Date 10/08/24   Note Type   Note Type Cancelled Session   Cancel Reasons Refusal  (PT attempted to see this pt on this date for continued therpay services. When speaking with pt, pt reports they do not want to participate in therapy, stating \"I walk enough, I don't need to go on a walk\")     Newton Batres, PT, DPT                                                                                 "

## 2024-10-08 NOTE — TELEPHONE ENCOUNTER
Called patient and spoke with him regarding HFU. Patient declined to schedule. I did advise him that he can schedule an HFU up to 1 yr from his d/c date, anything after that would be a new patient appt. He verbalized understanding.     Not scheduling HFU at this time, va patient and will be seen by a va neurologist       Thank you,     [facility] Neurology        HFU/ PAULO ABDI/ Peripheral vertigo,Stroke-like symptom     DC- HOME- 10/08/2024    ----- Message from CORINNE Begum sent at 10/8/2024 12:16 PM EDT -----  Regarding: HFU  Rashaad Centeno will need follow-up in in 6 weeks with general neurology team for Peripheral vertigo in 30 minute appointment. They will not require outpatient neurological testing.

## 2024-10-08 NOTE — ASSESSMENT & PLAN NOTE
Lab Results   Component Value Date    HGBA1C 7.4 (H) 10/07/2024       Recent Labs     10/07/24  1134 10/07/24  1659 10/07/24  2058 10/08/24  0731   POCGLU 154* 138 161* 119       Blood Sugar Average: Last 72 hrs:  (P) [id number]    -Medical management per primary team

## 2024-10-08 NOTE — ASSESSMENT & PLAN NOTE
69 year old with past history of uncontrolled diabetes, prostate cancer, peripheral neuropathy, chronic gait dysfunction who presents to [facility] on 10/6, as a stroke alert. To summarize the patient presented to hospital for evaluation of dizziness, the patient stated that he began to have dizziness described vertigo around 1500 on day of presentation, he also has a history of vertigo and noted it felt similar to prior episodes.  He was also having left facial numbness after symptom onset, and mild blurring of vision.  He also had CP and dyspnea with this episode.   Blood pressure was 127/78 on arrival, the patient was not on AP or AC at home, symptoms did improve in the ED. NIH was reported to be 0.  He has recently noted hearing loss as well, along with ear ringing.    The patient was not a TNK candidate there was no LVO for endovascular procedure.  It was felt that his symptoms were more likely from a peripheral cause, but the patient was placed on the stroke pathway and loaded with aspirin and Plavix. Neurology was asked evaluate the patient.     Workup:  -CT head: No acute intracranial abnormality.   -CTA head and neck: No large vessel occlusion in the head or neck.   -MRI brain: No evidence of acute infarct, intracranial hemorrhage or mass.   -Echo: Bilateral atrium size normal.  -Labs: A1C 7.4, LDL 92    Imaging without acute infarct noted. Suspect most likely peripheral etiology.    Plan:  -Stroke pathway  No need to continue antiplatelet therapy from neuro standpoint  Atorvastatin 40 mg- defer to primary team if need to continue  Continue telemetry  PT/OT/ST  Frequent neuro checks. Continue to monitor and notify neurology with any changes.   -Medical management and supportive care per primary team. Correction of any metabolic or infectious disturbances.    -No further inpatient neurology recommendations at this time. Please call with any questions.  -Case and treatment plan reviewed with  attending neurologist, Dr. Brown. Please see attending attestation for any further recommendations.

## 2024-11-06 ENCOUNTER — TELEPHONE (OUTPATIENT)
Dept: OBGYN CLINIC | Facility: CLINIC | Age: 69
End: 2024-11-06

## 2024-11-06 NOTE — TELEPHONE ENCOUNTER
Tried to call patient to make an appointment for the right knee and patient is also interested in getting a knee replacement provided call back number [phone]

## 2024-11-20 ENCOUNTER — OFFICE VISIT (OUTPATIENT)
Dept: OBGYN CLINIC | Facility: CLINIC | Age: 69
End: 2024-11-20
Payer: COMMERCIAL

## 2024-11-20 VITALS
BODY MASS INDEX: 28.63 KG/M2 | HEART RATE: 73 BPM | OXYGEN SATURATION: 98 % | HEIGHT: 70 IN | WEIGHT: 200 LBS | SYSTOLIC BLOOD PRESSURE: 164 MMHG | DIASTOLIC BLOOD PRESSURE: 77 MMHG

## 2024-11-20 DIAGNOSIS — M17.11 PRIMARY OSTEOARTHRITIS OF RIGHT KNEE: Primary | ICD-10-CM

## 2024-11-20 PROCEDURE — 99203 OFFICE O/P NEW LOW 30 MIN: CPT | Performed by: FAMILY MEDICINE

## 2024-11-20 RX ORDER — DICLOFENAC SODIUM 1 MG/ML
SOLUTION/ DROPS OPHTHALMIC
COMMUNITY
Start: 2024-11-15

## 2024-11-20 RX ORDER — LOSARTAN POTASSIUM 25 MG/1
25 TABLET ORAL DAILY
COMMUNITY

## 2024-11-20 RX ORDER — PREDNISOLONE ACETATE 10 MG/ML
SUSPENSION/ DROPS OPHTHALMIC
COMMUNITY
Start: 2024-11-15

## 2024-11-20 RX ORDER — ASPIRIN 81 MG/1
81 TABLET ORAL DAILY
COMMUNITY
Start: 2024-10-10

## 2024-11-20 NOTE — PROGRESS NOTES
Assessment/Plan:  Assessment & Plan   Diagnoses and all orders for this visit:    Primary osteoarthritis of right knee  -     Ambulatory Referral to Orthopedic Surgery; Future    Other orders  -     Glucose 15 GM/32ML GEL; Take by mouth  -     aspirin (Aspirin EC Adult Low Dose) 81 mg EC tablet; Take 81 mg by mouth daily (Patient not taking: Reported on 11/20/2024)  -     diclofenac (VOLTAREN) 0.1 % ophthalmic solution; INSTILL 1 DROP IN BOTH EYES 4 TIMES A DAY  -     losartan (COZAAR) 25 mg tablet; Take 25 mg by mouth daily (Patient not taking: Reported on 11/20/2024)  -     prednisoLONE acetate (PRED FORTE) 1 % ophthalmic suspension; INSTILL 1 DROP INTO EACH EYE 4 TIMES DAILY  -     semaglutide, 0.25 or 0.5 mg/dose, (Ozempic, 0.25 or 0.5 MG/DOSE,) 2 mg/1.5 mL injection pen; Inject 0.25 mg under the skin every 7 days        69-year-old male [place]  with right knee pain many years duration.  Discussed with patient imaging studies, clinical exam, impression, and plan.  X-rays right knee noted for significant medial space narrowing bone-on-bone and genu varum.  Imaging studies, clinical exam, and history suggest that he has symptoms from degenerative changes of the knee.  Symptoms persist despite conservative management with topical anesthetics/anti-inflammatory, icing, visco injection.  At this time I will refer him to orthopedic surgeon for further evaluation and recommendation of treatment.          Subjective:   Patient ID: Rashaad Centeno is a 69 y.o. male.  Chief Complaint   Patient presents with    Right Knee - Pain        69-year-old male [place]  presents for evaluation right knee pain many years duration.  He reports having sustained an injury to the knee many years ago at which she had hyperextension and varus mechanism.  Since then he has been having issues of the knee.  He was treated with injections including visco injection more than 6 years ago and he states at  "1 point surgery/knee replacement was discussed with him, however he did not want to proceed with surgery at that time.  Pain today worsening over the past several years.  He has pain described localized to the medial aspect the knee, aching throbbing and burning, and rating distally to the calf, worse with bearing weight and ambulating, and improved with resting.  He has tried icing and also topical diclofenac to help with symptoms.      Knee Pain  This is a chronic problem. The current episode started more than 1 year ago. The problem occurs daily. The problem has been gradually worsening. Associated symptoms include arthralgias and joint swelling. Pertinent negatives include no numbness or weakness. The symptoms are aggravated by standing and walking. He has tried rest, position changes and ice (Topical diclofenac, visco injection) for the symptoms. The treatment provided mild relief.           The following portions of the patient's history were reviewed and updated as appropriate: He  has a past medical history of Diabetes mellitus ([facility]) and Prostate cancer (HCC).  He is allergic to codeine..    Review of Systems   Musculoskeletal:  Positive for arthralgias and joint swelling.   Neurological:  Negative for weakness and numbness.       Objective:  Vitals:    11/20/24 1124   BP: 164/77   Pulse: 73   SpO2: 98%   Weight: 90.7 kg (200 lb)   Height: 5' 10\" (1.778 m)      Right Ankle Exam     Muscle Strength   Dorsiflexion:  5/5  Plantar flexion:  5/5       Right Knee Exam     Muscle Strength   The patient has normal right knee strength.    Tenderness   The patient is experiencing tenderness in the medial joint line.    Range of Motion   Extension:  -5   Flexion:  110     Tests    Valgus: positive (laxity, no pain)    Other   Swelling: mild    Comments:  Genu varum      Right Hip Exam     Range of Motion   External rotation:  30   Internal rotation:  5     Muscle Strength   Flexion: 5/5     Tests   SHAUN: " negative    Comments:  Negative FADDIR      Left Hip Exam     Range of Motion   External rotation:  30   Internal rotation: 5     Muscle Strength   Flexion: 5/5     Tests   SHAUN: negative    Comments:  Negative FADDIR          Strength/Myotome Testing     Right Ankle/Foot   Dorsiflexion: 5  Plantar flexion: 5      Physical Exam  Vitals and nursing note reviewed.   Constitutional:       Appearance: Normal appearance. He is well-developed. He is not ill-appearing or diaphoretic.   HENT:      Head: Normocephalic and atraumatic.      Right Ear: External ear normal.      Left Ear: External ear normal.   Eyes:      Conjunctiva/sclera: Conjunctivae normal.   Neck:      Trachea: No tracheal deviation.   Cardiovascular:      Rate and Rhythm: Normal rate.   Pulmonary:      Effort: Pulmonary effort is normal. No respiratory distress.   Abdominal:      General: There is no distension.   Musculoskeletal:         General: Swelling and tenderness present.   Skin:     General: Skin is warm and dry.      Coloration: Skin is not jaundiced or pale.   Neurological:      Mental Status: He is alert and oriented to person, place, and time.   Psychiatric:         Mood and Affect: Mood normal.         Behavior: Behavior normal.         Thought Content: Thought content normal.         Judgment: Judgment normal.         I have personally reviewed pertinent films in PACS and my interpretation is  .  X-rays right knee noted for significant medial space narrowing bone-on-bone and genu varum.

## 2024-11-27 ENCOUNTER — OFFICE VISIT (OUTPATIENT)
Dept: OBGYN CLINIC | Facility: CLINIC | Age: 69
End: 2024-11-27
Payer: COMMERCIAL

## 2024-11-27 ENCOUNTER — APPOINTMENT (OUTPATIENT)
Dept: RADIOLOGY | Facility: CLINIC | Age: 69
End: 2024-11-27
Payer: COMMERCIAL

## 2024-11-27 VITALS — HEIGHT: 70 IN | WEIGHT: 200 LBS | BODY MASS INDEX: 28.63 KG/M2

## 2024-11-27 DIAGNOSIS — M17.11 PRIMARY OSTEOARTHRITIS OF RIGHT KNEE: Primary | ICD-10-CM

## 2024-11-27 DIAGNOSIS — M25.561 CHRONIC PAIN OF RIGHT KNEE: ICD-10-CM

## 2024-11-27 DIAGNOSIS — M17.11 PRIMARY OSTEOARTHRITIS OF RIGHT KNEE: ICD-10-CM

## 2024-11-27 DIAGNOSIS — G89.29 CHRONIC PAIN OF RIGHT KNEE: ICD-10-CM

## 2024-11-27 PROCEDURE — 99204 OFFICE O/P NEW MOD 45 MIN: CPT | Performed by: ORTHOPAEDIC SURGERY

## 2024-11-27 PROCEDURE — 73564 X-RAY EXAM KNEE 4 OR MORE: CPT

## 2024-11-27 NOTE — PROGRESS NOTES
Patient Name:  Rashaad Centeno  MRN:  [id number]    Assessment & Plan     1. Primary osteoarthritis of right knee  -     XR knee 4+ vw right injury; Future; Expected date: 11/27/2024  -     Ambulatory Referral to Orthopedic Surgery  -     Durable Medical Equipment  2. Chronic pain of right knee    Right knee osteoarthritis  X-rays reviewed in office today with patient  In depth conversation had with patient regarding nonoperative and surgical management  Continued nonoperative treatment by means of OTC topical and oral analgesics, activity modification, outpatient physical therapy, injection therapy, and bracing.   Surgical management by means of Right total knee arthroplasty.  Risks of surgery, including but not limited to, infection, iatrogenic fracture, periprosthetic fracture, aseptic loosening, persistent pain, wound healing complications, gait dysfunction, nerve injury, blood vessel injury, DVT/PE, loss of life or limb, postoperative stiffness, need for subsequent surgery including revision surgery, numbness/tingling in lower extremity and/or over surgical incision, bleeding complications requiring blood transfusion, and anesthesia complications. Educated patient about post operative pain.   Educated patient about patient's higher risk of post operative infection secondary to uncontrolled diabetes, persistent lower extremity wounds and pets at home.    Educated patient about possible referral to adult reconstructive specialist secondary to significant varus deformity and comorbidities.   Placed order for  brace and provided contact information to patient to make appointment for custom fitting.  Patient declined injection in the office today.  Follow up as needed. Call sooner if he wishes to move forward with injection therapy.     Chief Complaint     Right knee pain    History of the Present Illness     Rashaad Centeno is a 69 y.o. male with Right knee pain ongoing since 2013. Patient reports he had a  "fall and \"the knee went out and went to the Right\". He admits to instability since the fall, especially with ambulation. Patient had previous CSI and visco injections which did not provide significant pain relief. Patient admits to history of osteomyelitis s/p Left great toe amputation. Patient reports he gets wounds frequently on his legs; most recent was a few days ago and covered it because he does not want his dog near the wound. [facility]. Patient has history of diabetes with most recent HGBA1c 7.4 performed 10.7. Previous HGBA1c performed 10/26/2023 was 14.7.     Review of Systems     Review of Systems   Constitutional:  Negative for chills and fever.   HENT:  Negative for ear pain and sore throat.    Eyes:  Negative for pain and visual disturbance.   Respiratory:  Negative for cough and shortness of breath.    Cardiovascular:  Negative for chest pain and palpitations.   Gastrointestinal:  Negative for abdominal pain and vomiting.   Genitourinary:  Negative for dysuria and hematuria.   Musculoskeletal:  Negative for arthralgias and back pain.   Skin:  Negative for color change and rash.   Neurological:  Negative for seizures and syncope.   All other systems reviewed and are negative.      Physical Exam     Ht 5' 10\" (1.778 m)   Wt 90.7 kg (200 lb)   BMI 28.70 kg/m²     Right Knee  Varus alignment  Range of motion from 5 to 105 degrees partially correctable varus  There is trace crepitus with range of motion.   There is no effusion.    There is tenderness over the medial compartment.    There is 5/5 quadriceps strength and preserved tone.    The patient is able to perform a straight leg raise.    positive  patellar grind test.  Varus stress testing reveals no pain or instability at 0 and 30 degrees   Valgus stress testing reveals partially correctable varus at 0 and 30 degrees  The patient is neurovascular intact distally.      Eyes:  Anicteric sclerae.  Neck:  Supple.  Lungs:  Normal " respiratory effort.  Cardiovascular:  Capillary refill is less than 2 seconds.  Skin:  Intact without erythema.  Neurologic:  Sensation grossly intact to light touch.  Psychiatric:  Mood and affect are appropriate.    Data Review     I have personally reviewed pertinent films in PACS, and my interpretation follows:    X-rays taken 11/27/2024 of Right knee independently reviewed and demonstrate severe medial and moderate patellofemoral compartment joint space narrowing. No acute fracture or dislocation.    Past Medical History:   Diagnosis Date    Diabetes mellitus (HCC)     Prostate cancer (HCC)        Past Surgical History:   Procedure Laterality Date    PROSTATE SURGERY      TOE AMPUTATION Left 10/31/2023    Procedure: AMPUTATION OF LEFT HALLUX;  Surgeon: Anders Kohli DPM;  Location: [facility];  Service: Podiatry       Allergies   Allergen Reactions    Codeine GI Intolerance       Current Outpatient Medications on File Prior to Visit   Medication Sig Dispense Refill    Alcohol Swabs 70 % PADS May substitute brand based on insurance coverage. Check glucose TID. 100 each 0    Blood Glucose Monitoring Suppl (OneTouch Verio Reflect) w/Device KIT May substitute brand based on insurance coverage. Check glucose TID. 1 kit 0    diclofenac (VOLTAREN) 0.1 % ophthalmic solution INSTILL 1 DROP IN BOTH EYES 4 TIMES A DAY      Glucose 15 GM/32ML GEL Take by mouth      glucose blood (OneTouch Verio) test strip May substitute brand based on insurance coverage. Check glucose TID. 100 each 0    Insulin Pen Needle (BD Pen Needle Maricarmen 2nd Gen) 32G X 4 MM MISC For use with insulin pen. Pharmacy may dispense brand covered by insurance. 100 each 0    Insulin Pen Needle (BD Pen Needle Maricarmen 2nd Gen) 32G X 4 MM MISC For use with insulin pen. Pharmacy may dispense brand covered by insurance. 100 each 0    OneTouch Delica Lancets 33G MISC May substitute brand based on insurance coverage. Check glucose TID. 100 each 0    prednisoLONE  acetate (PRED FORTE) 1 % ophthalmic suspension INSTILL 1 DROP INTO EACH EYE 4 TIMES DAILY      semaglutide, 0.25 or 0.5 mg/dose, (Ozempic, 0.25 or 0.5 MG/DOSE,) 2 mg/1.5 mL injection pen Inject 0.25 mg under the skin every 7 days      acetaminophen (TYLENOL) 325 mg tablet Take 2 tablets (650 mg total) by mouth every 6 (six) hours as needed for mild pain (Patient not taking: Reported on 11/20/2024)  0    aspirin (Aspirin EC Adult Low Dose) 81 mg EC tablet Take 81 mg by mouth daily (Patient not taking: Reported on 11/20/2024)      insulin aspart (NovoLOG FlexPen) 100 UNIT/ML injection pen Inject 8 Units under the skin 3 (three) times a day with meals (Patient not taking: Reported on 11/20/2024) 7.2 mL 0    Insulin Glargine Solostar (Lantus SoloStar) 100 UNIT/ML SOPN Inject 0.38 mL (38 Units total) under the skin daily at bedtime 11.4 mL 0    losartan (COZAAR) 25 mg tablet Take 25 mg by mouth daily (Patient not taking: Reported on 11/20/2024)       No current facility-administered medications on file prior to visit.       Social History     Tobacco Use    Smoking status: Former    Smokeless tobacco: Never   Vaping Use    Vaping status: Never Used   Substance Use Topics    Alcohol use: Never    Drug use: No       History reviewed. No pertinent family history.          Procedures Performed     Procedures  None       Garrison Whitt DO

## 2025-03-24 VITALS — BODY MASS INDEX: 28.06 KG/M2 | HEIGHT: 70 IN | WEIGHT: 196 LBS

## 2025-03-24 DIAGNOSIS — M25.461 EFFUSION OF RIGHT KNEE: ICD-10-CM

## 2025-03-24 DIAGNOSIS — M17.11 PRIMARY OSTEOARTHRITIS OF RIGHT KNEE: Primary | ICD-10-CM

## 2025-03-24 DIAGNOSIS — M25.561 ACUTE PAIN OF RIGHT KNEE: ICD-10-CM

## 2025-03-24 PROCEDURE — 99213 OFFICE O/P EST LOW 20 MIN: CPT | Performed by: ORTHOPAEDIC SURGERY

## 2025-03-24 PROCEDURE — 20610 DRAIN/INJ JOINT/BURSA W/O US: CPT | Performed by: ORTHOPAEDIC SURGERY

## 2025-03-24 RX ORDER — LIDOCAINE HYDROCHLORIDE 10 MG/ML
2 INJECTION, SOLUTION INFILTRATION; PERINEURAL
Status: COMPLETED | OUTPATIENT
Start: 2025-03-24 | End: 2025-03-24

## 2025-03-24 RX ORDER — METHYLPREDNISOLONE ACETATE 40 MG/ML
2 INJECTION, SUSPENSION INTRA-ARTICULAR; INTRALESIONAL; INTRAMUSCULAR; SOFT TISSUE
Status: COMPLETED | OUTPATIENT
Start: 2025-03-24 | End: 2025-03-24

## 2025-03-24 RX ORDER — BUPIVACAINE HYDROCHLORIDE 2.5 MG/ML
2 INJECTION, SOLUTION INFILTRATION; PERINEURAL
Status: COMPLETED | OUTPATIENT
Start: 2025-03-24 | End: 2025-03-24

## 2025-03-24 RX ADMIN — LIDOCAINE HYDROCHLORIDE 2 ML: 10 INJECTION, SOLUTION INFILTRATION; PERINEURAL at 13:00

## 2025-03-24 RX ADMIN — METHYLPREDNISOLONE ACETATE 2 ML: 40 INJECTION, SUSPENSION INTRA-ARTICULAR; INTRALESIONAL; INTRAMUSCULAR; SOFT TISSUE at 13:00

## 2025-03-24 RX ADMIN — BUPIVACAINE HYDROCHLORIDE 2 ML: 2.5 INJECTION, SOLUTION INFILTRATION; PERINEURAL at 13:00

## 2025-03-24 NOTE — PROGRESS NOTES
Name: Rashaad Centeno      : 1955      MRN: [id number]  Encounter Provider: Garrison Whitt DO  Encounter Date: 3/24/2025   Encounter department: [facility] [place]  :  Assessment & Plan  Primary osteoarthritis of right knee    Orders:    Large joint arthrocentesis: R knee    Effusion of right knee    Acute pain of right knee           Right knee osteoarthritis with varus deformity, recently worsening pain and effusion  X-rays reviewed and discussed with patient  Patient to be full weightbearing to RLE (Right Lower Extremity)  ROM as tolerated to RLE (Right Lower Extremity)  Discussed treatment options moving forward including over the counter analgesics and topical creams, gentle range of motion, physical therapy, bracing, corticosteroid injections, viscosupplementation, total knee arthoplasty  Patient to continue at home analgesic regimen with Tylenol   The patient was offered an aspiration and corticosteroid injection for their right knee. 43 cc of synovial fluid was aspirated. They tolerated the procedure well.  The patient was educated they may have some irritation in the next few days and should rest, ice, elevate and perform gentle range of motion exercises. They were advised the medicine should begin to work in a few days time.  They were informed their blood sugar levels can temporarily elevate and should reach out to their PCP if this becomes an issue. The patient was informed corticosteroid injections can be repeated at the earliest every 3 months.   Patient to follow up as needed.         History of the Present Illness   History of Present Illness   HPI   Rashaad Centeno is a 70 y.o. male with Right knee osteoarthritis. The patient began having swelling at the medial aspect of the knee on Friday. This increases as the day progresses. His pain has worsened since his previous visit. He notes an increase in clicking. He had difficulty with ambulation. He is not  "using anything for pain. He uses his medial  brace while more active.       Review of Systems     Review of Systems   Constitutional:  Negative for appetite change and unexpected weight change.   HENT:  Negative for congestion and trouble swallowing.    Eyes:  Negative for visual disturbance.   Respiratory:  Negative for cough and shortness of breath.    Cardiovascular:  Negative for chest pain and palpitations.   Gastrointestinal:  Negative for nausea and vomiting.   Endocrine: Negative for cold intolerance and heat intolerance.   Musculoskeletal:  Positive for arthralgias (right knee). Negative for joint swelling and myalgias.   Skin:  Negative for rash.   Neurological:  Negative for numbness.       Physical Exam   Objective   Ht 5' 10\" (1.778 m)   Wt 88.9 kg (196 lb)   BMI 28.12 kg/m²        Right Knee  Varus alignment  Range of motion from 7 to 105 degrees partially correctable varus  There is trace crepitus with range of motion.   There is trace effusion.    Possible parameniscal cyst developing medial joint line  There is tenderness over the medial compartment.    There is 5/5 quadriceps strength and preserved tone.    The patient is able to perform a straight leg raise.    positive  patellar grind test.  Varus stress testing reveals no pain or instability at 0 and 30 degrees   Valgus stress testing reveals partially correctable varus at 0 and 30 degrees  The patient is neurovascular intact distally.      Data Review     I have personally reviewed pertinent films in PACS, and my interpretation follows.    X-rays taken 11/27/2024 of Right knee independently reviewed and demonstrate severe medial and moderate patellofemoral compartment joint space narrowing. No acute fracture or dislocation.    Most recent hemoglobin A1c from 10/7/2024 7.4.     Lab Results   Component Value Date/Time    HGBA1C 7.4 (H) 10/07/2024 04:48 AM    HGBA1C 6.0 (H) 10/25/2019 08:53 AM         Social History     Tobacco Use    " "Smoking status: Former    Smokeless tobacco: Never   Vaping Use    Vaping status: Never Used   Substance Use Topics    Alcohol use: Never    Drug use: No           Large joint arthrocentesis: R knee  Universal Protocol:  Consent: Verbal consent obtained.  Risks and benefits: risks, benefits and alternatives were discussed  Consent given by: patient  Time out: Immediately prior to procedure a \"time out\" was called to verify the correct patient, procedure, equipment, support staff and site/side marked as required.  Patient understanding: patient states understanding of the procedure being performed  Site marked: the operative site was marked  Patient identity confirmed: verbally with patient  Supporting Documentation  Indications: pain and joint swelling   Procedure Details  Location: knee - R knee  Preparation: Patient was prepped and draped in the usual sterile fashion  Needle size: 18 G  Ultrasound guidance: no  Approach: anterolateral  Medications administered: 2 mL lidocaine 1 %; 2 mL methylPREDNISolone acetate 40 mg/mL; 2 mL bupivacaine 0.25 %    Aspirate amount: 43 mL  Aspirate: clear, serous and yellow  Patient tolerance: patient tolerated the procedure well with no immediate complications  Dressing:  Sterile dressing applied            Florence Do   Scribe Attestation      I,:  Florence Do am acting as a scribe while in the presence of the attending physician.:       I,:  Garrison Whitt DO personally performed the services described in this documentation    as scribed in my presence.:             "

## 2025-03-24 NOTE — PATIENT INSTRUCTIONS
Cortisone Injections in Diabetics    Cortisone injections are commonly used to treat a variety of orthopedic conditions. People with diabetes are especially prone to side effects from cortisone injections, often experiencing a temporary rise in blood sugars in the following hours or days. That can be alarming if you're not expecting it.    Cortisone is a powerful anti-inflammatory medication that can be injected around tendons or joints where inflammation is present. Cortisone injections are often used in the treatment of conditions including tendonitis, bursitis, and arthritis.  Several common, and many uncommon, side effects can occur after a cortisone shot, and before having this treatment you should discuss these possible complications with your healthcare provider.     While most cortisone side-effects are mild and temporary, it is worthwhile to discuss these possible problems so that you know what to expect after your injection.    Diabetes and Cortisone  A 2015 study investigated the use of cortisone injections in people with diabetes having injections for hand problems (such as trigger finger and carpal tunnel syndrome).  The participants were then surveyed daily until their symptoms resolved.   The study found:  80% of the patients reported elevated blood sugar following the injection.  The rise in blood sugar corresponded to the severity of diabetes as measured by hemoglobin A1C (HbA1c). In those whose HbA1c was greater than 7%, blood-sugar elevations were higher following the injection and the increase lasted longer.  Blood sugar levels gradually returned to normal over several days, and no one reported blood-sugar-control problems lasting longer than 5 days.  This was a relatively small study (25 patients), only included people who had injections into their hands, and only studied the effects of a single brand of cortisone. However, it provides some data on a subject that's well known but not well  documented in medical literature.  The study recommends avoiding cortisone injections if your HbA1c is greater than 7%. If the benefits of cortisone outweigh the risks, changes in diet and diabetes medication doses may help minimize the risk.    Benefits and Risks   Any treatment should be considered based on weighing the risks and the benefits of the treatment. In the case of cortisone injections, there are known side-effects that should be considered, but there are also potential benefits.    People with diabetes should be aware of the possible rise in blood sugar. Furthermore, more poorly controlled diabetics may want to avoid cortisone injections until alternative treatments have been exhausted.    You should discuss with your healthcare provider how high they might expect your blood sugar to rise so you know if there is a problem that requires more urgent evaluation.    Managing Increases   The good news is that elevations in blood sugar tend to be transient and usually resolve spontaneously after a few days. The peak elevation tends to occur anywhere from 5 to 84 hours after the injection.  If you self-administer insulin, you may need to:  Keep a close eye on blood sugars for 5 days following the injection  Monitor your blood sugar at least four times daily  Increase your insulin and oral medications if needed  While the majority of people who have elevated blood sugar following cortisone injections won't have serious repercussions, some cases may require more aggressive treatment.  Any time you have symptoms of rapidly elevating blood glucose, get immediate medical attention.    Managing Emergencies   If you're diagnosed with diabetes and receiving a cortisone injection, be aware of the potential for elevated blood sugars. Talk to your healthcare provider about the best way to manage this, as you may need to adjust your insulin dosage.    Remember these temporary elevations tend to resolve without treatment,  but seek treatment if your blood sugars appear to be behaving in an extreme or unexpected manner.

## 2025-04-21 ENCOUNTER — TELEPHONE (OUTPATIENT)
Dept: OBGYN CLINIC | Facility: HOSPITAL | Age: 70
End: 2025-04-21

## 2025-04-21 NOTE — TELEPHONE ENCOUNTER
Caller: Patient    Doctor: Dr. Whitt    Reason for call: Patient needed the referral # from visit on 3/24/25 - Referral # is [id number]      Call back#: n/a  
No

## (undated) DEVICE — MEDI-VAC YANKAUER SUCTION HANDLE W/STRAIGHT TIP & CONTROL VENT: Brand: CARDINAL HEALTH

## (undated) DEVICE — GAUZE SPONGES,16 PLY: Brand: CURITY

## (undated) DEVICE — BETHLEHEM UNIVERSAL  MIONR EXT: Brand: CARDINAL HEALTH

## (undated) DEVICE — TUBING SUCTION 5MM X 12 FT

## (undated) DEVICE — GLOVE INDICATOR PI UNDERGLOVE SZ 7.5 BLUE

## (undated) DEVICE — SUT VICRYL 2-0 SH 27 IN UNDYED J417H

## (undated) DEVICE — CUFF TOURNIQUET 18 X 4 IN QUICK CONNECT DISP 1 BLADDER

## (undated) DEVICE — KERLIX BANDAGE ROLL: Brand: KERLIX

## (undated) DEVICE — ACE WRAP 6 IN UNSTERILE

## (undated) DEVICE — WET SKIN PREP TRAY: Brand: MEDLINE INDUSTRIES, INC.

## (undated) DEVICE — PAD CAST 6 IN COTTON NON STERILE

## (undated) DEVICE — GLOVE SRG BIOGEL 7.5

## (undated) DEVICE — INTENDED FOR TISSUE SEPARATION, AND OTHER PROCEDURES THAT REQUIRE A SHARP SURGICAL BLADE TO PUNCTURE OR CUT.: Brand: BARD-PARKER ® CARBON RIB-BACK BLADES

## (undated) DEVICE — STOCKINETTE REGULAR

## (undated) DEVICE — CURITY NON-ADHERENT STRIPS: Brand: CURITY

## (undated) DEVICE — SUT ETHILON 3-0 PS-1 18 IN 1663H

## (undated) DEVICE — DRAPE EQUIPMENT RF WAND

## (undated) DEVICE — 4-PORT MANIFOLD: Brand: NEPTUNE 2

## (undated) DEVICE — PAD GROUNDING ADULT

## (undated) DEVICE — LIGHT HANDLE COVER SLEEVE DISP BLUE STELLAR

## (undated) DEVICE — ABDOMINAL PAD: Brand: DERMACEA

## (undated) DEVICE — SCD SEQUENTIAL COMPRESSION COMFORT SLEEVE MEDIUM KNEE LENGTH: Brand: KENDALL SCD